# Patient Record
Sex: MALE | Race: BLACK OR AFRICAN AMERICAN | URBAN - METROPOLITAN AREA
[De-identification: names, ages, dates, MRNs, and addresses within clinical notes are randomized per-mention and may not be internally consistent; named-entity substitution may affect disease eponyms.]

---

## 2017-11-07 ENCOUNTER — APPOINTMENT (OUTPATIENT)
Dept: GENERAL RADIOLOGY | Facility: CLINIC | Age: 52
End: 2017-11-07
Payer: OTHER MISCELLANEOUS

## 2017-11-07 ENCOUNTER — TELEPHONE (OUTPATIENT)
Dept: ORTHOPEDICS | Facility: CLINIC | Age: 52
End: 2017-11-07

## 2017-11-07 ENCOUNTER — HOSPITAL ENCOUNTER (EMERGENCY)
Facility: CLINIC | Age: 52
Discharge: HOME | End: 2017-11-07
Attending: EMERGENCY MEDICINE | Admitting: EMERGENCY MEDICINE
Payer: OTHER MISCELLANEOUS

## 2017-11-07 VITALS
HEIGHT: 77 IN | RESPIRATION RATE: 16 BRPM | HEART RATE: 72 BPM | OXYGEN SATURATION: 99 % | TEMPERATURE: 98.2 F | WEIGHT: 214 LBS | BODY MASS INDEX: 25.27 KG/M2

## 2017-11-07 DIAGNOSIS — S52.614A CLOSED NONDISPLACED FRACTURE OF STYLOID PROCESS OF RIGHT ULNA, INITIAL ENCOUNTER: ICD-10-CM

## 2017-11-07 DIAGNOSIS — S52.531A CLOSED COLLES' FRACTURE OF RIGHT RADIUS, INITIAL ENCOUNTER: ICD-10-CM

## 2017-11-07 PROCEDURE — 99285 EMERGENCY DEPT VISIT HI MDM: CPT | Mod: 25 | Performed by: EMERGENCY MEDICINE

## 2017-11-07 PROCEDURE — 96374 THER/PROPH/DIAG INJ IV PUSH: CPT | Performed by: EMERGENCY MEDICINE

## 2017-11-07 PROCEDURE — 73080 X-RAY EXAM OF ELBOW: CPT | Mod: RT

## 2017-11-07 PROCEDURE — 25605 CLTX DST RDL FX/EPHYS SEP W/: CPT | Mod: Z6 | Performed by: EMERGENCY MEDICINE

## 2017-11-07 PROCEDURE — 40000986 XR WRIST RT G/E 3 VW: Mod: RT

## 2017-11-07 PROCEDURE — 73110 X-RAY EXAM OF WRIST: CPT | Mod: RT

## 2017-11-07 PROCEDURE — 96376 TX/PRO/DX INJ SAME DRUG ADON: CPT | Performed by: EMERGENCY MEDICINE

## 2017-11-07 PROCEDURE — 25605 CLTX DST RDL FX/EPHYS SEP W/: CPT | Performed by: EMERGENCY MEDICINE

## 2017-11-07 PROCEDURE — 25000128 H RX IP 250 OP 636: Performed by: STUDENT IN AN ORGANIZED HEALTH CARE EDUCATION/TRAINING PROGRAM

## 2017-11-07 PROCEDURE — 25000125 ZZHC RX 250

## 2017-11-07 RX ORDER — OXYCODONE HYDROCHLORIDE 5 MG/1
5 TABLET ORAL EVERY 6 HOURS PRN
Qty: 10 TABLET | Refills: 0 | Status: SHIPPED | OUTPATIENT
Start: 2017-11-07 | End: 2017-11-14

## 2017-11-07 RX ORDER — HYDROMORPHONE HYDROCHLORIDE 1 MG/ML
0.5 INJECTION, SOLUTION INTRAMUSCULAR; INTRAVENOUS; SUBCUTANEOUS ONCE
Status: COMPLETED | OUTPATIENT
Start: 2017-11-07 | End: 2017-11-07

## 2017-11-07 RX ORDER — LIDOCAINE HYDROCHLORIDE 10 MG/ML
INJECTION, SOLUTION EPIDURAL; INFILTRATION; INTRACAUDAL; PERINEURAL
Status: COMPLETED
Start: 2017-11-07 | End: 2017-11-07

## 2017-11-07 RX ADMIN — LIDOCAINE HYDROCHLORIDE: 10 INJECTION, SOLUTION EPIDURAL; INFILTRATION; INTRACAUDAL; PERINEURAL at 12:39

## 2017-11-07 RX ADMIN — HYDROMORPHONE HYDROCHLORIDE 0.5 MG: 1 INJECTION, SOLUTION INTRAMUSCULAR; INTRAVENOUS; SUBCUTANEOUS at 12:40

## 2017-11-07 RX ADMIN — HYDROMORPHONE HYDROCHLORIDE 0.5 MG: 1 INJECTION, SOLUTION INTRAMUSCULAR; INTRAVENOUS; SUBCUTANEOUS at 10:33

## 2017-11-07 ASSESSMENT — ENCOUNTER SYMPTOMS
COLOR CHANGE: 0
BACK PAIN: 0
WOUND: 0
NUMBNESS: 0

## 2017-11-07 NOTE — TELEPHONE ENCOUNTER
Call placed to patient regarding appointment with Dr. Woodard on Thursday 11/9/17 at 6:15. Instructed him to arrive at 6:00.  Patient verbalized understanding and will have a ride to the appointment.

## 2017-11-07 NOTE — ED AVS SNAPSHOT
[facility], [place], Emergency Department    [address]    [facility] [address]    Phone:  [phone]    Fax:  [phone]                                       Justice Yee   MRN: [id number]    Department:  [facility], Emergency Department   Date of Visit:  11/7/2017           After Visit Summary Signature Page     I have received my discharge instructions, and my questions have been answered. I have discussed any challenges I see with this plan with the nurse or doctor.    ..........................................................................................................................................  Patient/Patient Representative Signature      ..........................................................................................................................................  Patient Representative Print Name and Relationship to Patient    ..................................................               ................................................  Date                                            Time    ..........................................................................................................................................  Reviewed by Signature/Title    ...................................................              ..............................................  Date                                                            Time

## 2017-11-07 NOTE — ED AVS SNAPSHOT
[facility], Emergency Department    [address]    [place] [address]    Phone:  [phone]    Fax:  [phone]                                       Justice Yee   MRN: [id number]    Department:  [facility], Emergency Department   Date of Visit:  11/7/2017           Patient Information     Date Of Birth          1965        Your diagnoses for this visit were:     Closed Colles' fracture of right radius, initial encounter     Closed nondisplaced fracture of styloid process of right ulna, initial encounter        You were seen by Anna Iverson MD.        Discharge Instructions       You were seen in the emergency department for a wrist fracture. You were given Dilaudid in the ED for pain and your wrist fracture was reduced to improve the alignment of the bones. A splint was applied. Please return the the emergency department if you have numbness in your fingers or hand or if you have difficulty moving your fingers. Please elevate your wrist, apply ice, and do not lift or use your arm. You can take Tylenol every 6 hours for pain as needed. Please use Tylenol before using the oxycodone.    Please make an appointment to follow up with Orthopedics (phone: [phone]) at your scheduled appointment.     Discharge References/Attachments     FRACTURE, WRIST, GENERAL (ENGLISH)      Future Appointments        Provider Department Dept Phone Center    11/9/2017 6:15 PM Carlitos Woodard MD [facility] [phone] [facility]      24 Hour Appointment Hotline       To make an appointment at any [facility], call [phone] ([phone]). If you don't have a family doctor or clinic, we will help you find one. [facility] are conveniently located to serve the needs of you and your family.             Review of your medicines      START taking        Dose / Directions Last dose taken    oxyCODONE IR 5 MG tablet   Commonly known as:  ROXICODONE   Dose:  5 mg   Quantity:   "10 tablet        Take 1 tablet (5 mg) by mouth every 6 hours as needed for pain   Refills:  0                Prescriptions were sent or printed at these locations (1 Prescription)                   Other Prescriptions                Printed at Department/Unit printer (1 of 1)         oxyCODONE IR (ROXICODONE) 5 MG tablet                Procedures and tests performed during your visit     Procedure/Test Number of Times Performed    Elbow XR, G/E 3 views, right 1    Orthopedic injury treatment 1    Wrist XR, G/E 3 views, right 2      Orders Needing Specimen Collection     None      Pending Results     No orders found from 11/5/2017 to 11/8/2017.            Pending Culture Results     No orders found from 11/5/2017 to 11/8/2017.            Pending Results Instructions     If you had any lab results that were not finalized at the time of your Discharge, you can call the ED Lab Result RN at [phone]. You will be contacted by this team for any positive Lab results or changes in treatment. The nurses are available 7 days a week from 10A to 6:30P.  You can leave a message 24 hours per day and they will return your call.        Thank you for choosing [facility]       Thank you for choosing [facility] for your care. Our goal is always to provide you with excellent care. Hearing back from our patients is one way we can continue to improve our services. Please take a few minutes to complete the written survey that you may receive in the mail after you visit with us. Thank you!        [employer] Information     [employer] lets you send messages to your doctor, view your test results, renew your prescriptions, schedule appointments and more. To sign up, go to [email or web address] . Click on \"Log in\" on the left side of the screen, which will take you to the Welcome page. Then click on \"Sign up Now\" on the right side of the page.     You will be asked to enter the access code listed below, as well as some personal information. " Please follow the directions to create your username and password.     Your access code is: [email or web address]  Expires: 2018  3:06 PM     Your access code will  in 90 days. If you need help or a new code, please call your [facility] clinic or [phone].        Care EveryWhere ID     This is your Care EveryWhere ID. This could be used by other organizations to access your [facility] medical records  [id number]        Equal Access to Services     [facility] : Hadii aad ku hadasho Soomaali, waaxda luqadaha, qaybta kaalmada aderios, sandra waters . So [facility] [phone].    ATENCIÓN: Si habla español, tiene a otero disposición servicios gratuitos de asistencia lingüística. Llame al [phone].    We comply with applicable federal civil rights laws and [place] laws. We do not discriminate on the basis of race, color, national origin, age, disability, sex, sexual orientation, or gender identity.            After Visit Summary       This is your record. Keep this with you and show to your community pharmacist(s) and doctor(s) at your next visit.

## 2017-11-07 NOTE — DISCHARGE INSTRUCTIONS
You were seen in the emergency department for a wrist fracture. You were given Dilaudid in the ED for pain and your wrist fracture was reduced to improve the alignment of the bones. A splint was applied. Please return the the emergency department if you have numbness in your fingers or hand or if you have difficulty moving your fingers. Please elevate your wrist, apply ice, and do not lift or use your arm. You can take Tylenol every 6 hours for pain as needed. Please use Tylenol before using the oxycodone.    Please make an appointment to follow up with Orthopedics (phone: [phone]) at your scheduled appointment.

## 2017-11-07 NOTE — ED PROVIDER NOTES
"  History     Chief Complaint   Patient presents with     Wrist Pain     right wrist     HPI  Justice Yee is a 52 year old right-handed male with no PMH who presents with right wrist pain after a fall. 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 He extended his right wrist to break his fall. He landed on his right hand, wrist, and elbow and heard a crack. He denies any LOC, hitting his head, or hitting any other part of his body. He felt immediate pain to the wrist and his coworkers splinted it. He had numbness to the dorsum of his hand and fingers after he fell that has since resolved. He reports 6/10, non-radiating pain to the wrist that is made much worse with movement. He has not taken anything for the pain. He denies numbness, tingling, elbow pain, or any other concerns.     I have reviewed the Medications, Allergies, Past Medical and Surgical History, and Social History in the Epic system.    History reviewed. No pertinent past medical history.    History reviewed. No pertinent surgical history.    No family history on file.    Social History   Substance Use Topics     Smoking status: Never Smoker     Smokeless tobacco: Never Used     Alcohol use No       Review of Systems   Musculoskeletal: Negative for back pain.        Right wrist pain. No elbow pain. No other joint pain.    Skin: Negative for color change and wound.   Neurological: Negative for syncope and numbness.        No tingling   All other systems reviewed and are negative.      Physical Exam   Pulse: 83  Temp: 98.2  F (36.8  C)  Resp: 16  Height: 195.6 cm (6' 5\")  Weight: 97.1 kg (214 lb)  SpO2: 99 %      Physical Exam   Constitutional: He is oriented to person, place, and time. He appears well-developed and well-nourished. No distress.   Appears mildly uncomfortably. Sitting on side of gurney. Pleasant. Cooperative.   HENT:   Head: Normocephalic and " atraumatic.   Eyes: No scleral icterus.   Neck: Normal range of motion. Neck supple.   Cardiovascular: Normal rate, regular rhythm, normal heart sounds and intact distal pulses.    No murmur heard.  Pulses:       Radial pulses are 2+ on the right side   Pulmonary/Chest: Effort normal and breath sounds normal. No respiratory distress. He has no wheezes. He has no rales.   Musculoskeletal:   Dinner-fork deformity of the right wrist. No skin wound. Tenderness to palpation of the wrist wrist. No ROM of wrist due to pain. Right elbow is without deformity and is nontender to palpation.    Neurological: He is alert and oriented to person, place, and time. No sensory deficit.   Sensation intact distal to right wrist. Able to move all right digits without difficulty.    Skin: Skin is warm and dry. No laceration and no rash noted. He is not diaphoretic. No erythema. No pallor.       ED Course     ED Course   9:46 AM Patient seen by resident  10:07 AM Patient seen by attending.  11:30 AM Discussed with orthopedics. Agree with plan for reduction. Patient will likely need surgery and orthopedics will set up an appointment.  12:30 PM Patient reassessed. Informed of xray results. Splint applied.  2:20 PM Discussed with orthopedics. No additional interventions needed in ED.  2:30 PM Patient reassessed. Discharged.     Orthopedic injury tx  Date/Time: 11/7/2017 2:56 PM  Performed by: LATIA SIBLEY  Authorized by: LATIA ISBLEY   Consent: Verbal consent obtained.  Risks and benefits: risks, benefits and alternatives were discussed  Consent given by: patient  Injury location: wrist  Location details: right wrist  Injury type: fracture  Fracture type: distal radius and ulnar styloid  Pre-procedure neurovascular assessment: neurovascularly intact  Pre-procedure distal perfusion: normal  Pre-procedure neurological function: normal  Pre-procedure range of motion: reduced  Anesthesia: hematoma block    Anesthesia:  Local  anesthesia used: yes  Local Anesthetic: lidocaine 1% without epinephrine  Anesthetic total: 10 mL    Sedation:  Patient sedated: no  Manipulation performed: yes  Reduction successful: yes  X-ray confirmed reduction: yes  Immobilization: splint  Splint type: sugar tong  Supplies used: plaster  Post-procedure neurovascular assessment: post-procedure neurovascularly intact  Post-procedure distal perfusion: normal  Post-procedure neurological function: normal  Post-procedure range of motion comment: splinted  Patient tolerance: Patient tolerated the procedure well with no immediate complications                   Results for orders placed or performed during the hospital encounter of 11/07/17 (from the past 24 hour(s))   Wrist XR, G/E 3 views, right    Narrative    XR WRIST RT G/E 3 VW 11/7/2017 11:12 AM    HISTORY: 51yo M fell off ladder.    COMPARISON: None.    FINDINGS: Comminuted intra-articular distal radial fracture with  impaction at the fracture site. Nondisplaced fracture through the  ulnar styloid. Carpal bones appear intact. Prominent dorsal soft  tissue swelling noted.      Impression    IMPRESSION: Intra-articular distal radial fracture and nondisplaced  ulnar styloid fracture.    KARLA OLIVEIRA MD   Elbow XR, G/E 3 views, right    Narrative    XR ELBOW RT G/E 3 VW 11/7/2017 11:13 AM    HISTORY: 51yo M fell off ladder.    COMPARISON: None.    FINDINGS: No fracture or malalignment. No joint effusion. Osseous  structures are within normal limits.      Impression    IMPRESSION: No acute osseous abnormality.    KARLA OLIVEIRA MD   Wrist XR, G/E 3 views, right    Narrative    XR WRIST RT G/E 3 VW 11/7/2017 2:18 PM    HISTORY: Radial and ulnar fracture reduction.    COMPARISON: 11/7/2017 at 11:01    FINDINGS: Alignment at the radial fracture site is improved. Ulnar  styloid fracture fragments are in anatomic alignment. No new  abnormality.      Impression    IMPRESSION: Improved alignment.    KARLA OLIVEIRA MD             Assessments & Plan (with Medical Decision Making)   Justice Yee is a 52 year old right-handed male with no PMH who presents with right wrist pain after a fall. Upon arrival in the ED, Mr. Yee had normal vital signs and was nontoxic appearing. He appeared to be mildly uncomfortable due to pain. He has an obvious deformity of his right wrist that could suggest a distal radius fracture. Will therefore obtain right wrist xray to assess for a fracture. Will given IV dilaudid for his current pain and because of anticipation of possible reduction. Mr. Yee did hit his right elbow, therefore will obtain right elbow xray. No other injuries. Neurovascularly intact. No open fracture. No evidence of compartment syndrome. X ray of wrist shows intra-articular distal radial fracture and nondisplaced ulnar styloid fracture. Elbow without fracture. Hematoma block given, reduction performed, and sugar tongs splint applied. Patient given Dilaudid 0.5mg IV after splint applied. Sensation intact distal to splint. Patient able to move his fingers. Post-reduction wrist xray showed improved alignment. Discussed with orthopedic surgery who will see the patient in 3-5 days in clinic for planned surgical reduction. Mr. Yee was instructed to ice and elevate his arm and not use the arm. He was instructed to take Tylenol q6hrs for pain and take oxycodone as needed. He was given a prescription for oxycodone. He was instructed to return to the ED if he has numbness to his fingers or is unable to move his fingers.     SUPERVISORY NOTE:    This data collected with the Resident working in the Emergency Department.  Patient was seen and evaluated by myself and I repeated the history and physical exam with the patient.  The plan of care was discussed with them.  The key portions of the note including the entire assessment and plan reflect my documentation.    53 y/o right hand dominant male who sustained a mechanical fall on an  outstretched right hand during work.  No associated injuries on history or physical exam.  Patient is neurovascularly intact.  No open fracture.  No evidence of compartment syndrome.  Xray shows intra-articular distal radial fracture with ulnar styloid fracture.  Hematoma block completed, wrist reduced and splinted.  Discussed with orthopedic surgery and will see in clinic in 3-5 days to plan surgical reduction.  Given analgesics and close return instructions and voiced understanding.      I have reviewed the nursing notes.    I have reviewed the findings, diagnosis, plan and need for follow up with the patient.    New Prescriptions    OXYCODONE IR (ROXICODONE) 5 MG TABLET    Take 1 tablet (5 mg) by mouth every 6 hours as needed for pain       Final diagnoses:   Closed Colles' fracture of right radius, initial encounter   Closed nondisplaced fracture of styloid process of right ulna, initial encounter     Babita Gerardo MD    11/7/2017   [facility], [place], EMERGENCY DEPARTMENT     Anna Iverson MD  11/07/17 [id number]

## 2017-11-07 NOTE — TELEPHONE ENCOUNTER
APPT INFO    Date /Time: 11/9/17 6:15pm   Reason for Appt: R Distal Radius Fx   Ref Provider/Clinic: ED/Hosp   Are there internal records? If yes, list: Yes - [facility] ED/Hosp 11/7/17   Patient Contact (Y/N) & Call Details: No - hosp f/u   Action: --

## 2017-11-09 ENCOUNTER — DOCUMENTATION ONLY (OUTPATIENT)
Dept: ORTHOPEDICS | Facility: CLINIC | Age: 52
End: 2017-11-09

## 2017-11-09 ENCOUNTER — PRE VISIT (OUTPATIENT)
Dept: ORTHOPEDICS | Facility: CLINIC | Age: 52
End: 2017-11-09

## 2017-11-09 ENCOUNTER — OFFICE VISIT (OUTPATIENT)
Dept: ORTHOPEDICS | Facility: CLINIC | Age: 52
End: 2017-11-09

## 2017-11-09 VITALS — WEIGHT: 214 LBS | HEIGHT: 77 IN | BODY MASS INDEX: 25.27 KG/M2

## 2017-11-09 DIAGNOSIS — S52.571A OTHER CLOSED INTRA-ARTICULAR FRACTURE OF DISTAL END OF RIGHT RADIUS, INITIAL ENCOUNTER: Primary | ICD-10-CM

## 2017-11-09 RX ORDER — OXYCODONE HYDROCHLORIDE 5 MG/1
5 TABLET ORAL EVERY 4 HOURS PRN
Qty: 30 TABLET | Refills: 0 | Status: SHIPPED | OUTPATIENT
Start: 2017-11-09 | End: 2017-11-13

## 2017-11-09 NOTE — LETTER
"2017       RE: Justice Yee  [address]   [facility] [address]     Dear Colleague,    Thank you for referring your patient, Justice Yee, to the [facility] ORTH[facility] at [facility]. Please see a copy of my visit note below.    REFERRING PROVIDER: Dr. 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 He comes in today with his younger son. He was evaluated in the ED and was found to have a distal radius fracture. He was reduced, splinted, and referred here. Reports intermittent numbness and tingling due to swelling. Is requesting pain medication.    MEDS:   Prior to Admission medications    Medication Sig Start Date End Date Taking? Authorizing Provider   oxyCODONE IR (ROXICODONE) 5 MG tablet Take 1 tablet (5 mg) by mouth every 6 hours as needed for pain 17   Babita Gerardo MD       ALLERGIES: No Known Allergies    PMH: None    PSH: None    SH:   Social History   Substance Use Topics     Smoking status: Never Smoker     Smokeless tobacco: Never Used     Alcohol use No       FH: None    ROS: Denies chest pain, shortness of breath, MI, CVA, diabetes, DVT, PE, and bleeding disorders.    PHYSICAL EXAMINATION: Ht 1.956 m (6' 5\")  Wt 97.1 kg (214 lb)  BMI 25.38 kg/m2  Gen: In no acute distress.  On exam, the exposed R fingers are moderately swollen. The splint Ace bandage was loosened and this improved the tingling sensation in the fingers. All digits were well perfused. R thumb and IF flexion and extension were intact. Sensation to light touch intact in all digits.    IMAGIN2017 XR of R wrist reviewed. This showed partial reduction following splinting, but still with reduced radial height, dorsal tilt, and intra articular fracture of the distal radius.    ASSESSMENT: R " distal radius fracture with inadequate reduction. R ulnar styloid fracture, nondisplaced. Sensory symptoms improved with loosening of Ace bandage.    PLAN: Patient is a candidate for ORIF of R distal radius. Risks, benefits, and alternatives were discussed. Explained the possible need for ulnar styloid fixation if DRUJ is unstable. Patient wishes to proceed with surgery. Workman's comp paperwork filled out. Will keep patient out of work until surgery due to the type of work he does. Consents obtained today. Pain medication given to last until early next week.    Total time spent with patient was 30 min of which greater than 50% was in counseling.    Again, thank you for allowing me to participate in the care of your patient.      Sincerely,    Carlitos Woodard MD

## 2017-11-09 NOTE — MR AVS SNAPSHOT
"              After Visit Summary   2017    Justice Yee    MRN: [id number]           Patient Information     Date Of Birth          1965        Visit Information        Provider Department      2017 6:15 PM Carlitos Woodard MD [facility]        Today's Diagnoses     Other closed intra-articular fracture of distal end of right radius, initial encounter    -  1       Follow-ups after your visit        Who to contact     Please call your clinic at [phone] to:    Ask questions about your health    Make or cancel appointments    Discuss your medicines    Learn about your test results    Speak to your doctor   If you have compliments or concerns about an experience at your clinic, or if you wish to file a complaint, please contact [facility] Patient Relations at [phone] or email us at [email or web address]         Additional Information About Your Visit        MyChart Information     TalkBin is an electronic gateway that provides easy, online access to your medical records. With TalkBin, you can request a clinic appointment, read your test results, renew a prescription or communicate with your care team.     To sign up for TalkBin visit the website at [email or web address]   You will be asked to enter the access code listed below, as well as some personal information. Please follow the directions to create your username and password.     Your access code is: [email or web address]  Expires: 2018  3:06 PM     Your access code will  in 90 days. If you need help or a new code, please contact your [facility] or call [phone] for assistance.        Care EveryWhere ID     This is your Care EveryWhere ID. This could be used by other organizations to access your [place] medical records  [id number]        Your Vitals Were     Height BMI (Body Mass Index)                1.956 m (6' 5\") 25.38 kg/m2           " Blood Pressure from Last 3 Encounters:   No data found for BP    Weight from Last 3 Encounters:   11/09/17 97.1 kg (214 lb)   11/07/17 97.1 kg (214 lb)              Today, you had the following     No orders found for display         Today's Medication Changes          These changes are accurate as of: 11/9/17  6:54 PM.  If you have any questions, ask your nurse or doctor.               These medicines have changed or have updated prescriptions.        Dose/Directions    * oxyCODONE IR 5 MG tablet   Commonly known as:  ROXICODONE   This may have changed:  Another medication with the same name was added. Make sure you understand how and when to take each.        Dose:  5 mg   Take 1 tablet (5 mg) by mouth every 6 hours as needed for pain   Quantity:  10 tablet   Refills:  0       * oxyCODONE IR 5 MG tablet   Commonly known as:  ROXICODONE   This may have changed:  You were already taking a medication with the same name, and this prescription was added. Make sure you understand how and when to take each.   Used for:  Other closed intra-articular fracture of distal end of right radius, initial encounter   Changed by:  Carlitos Woodard MD        Dose:  5 mg   Take 1 tablet (5 mg) by mouth every 4 hours as needed for moderate to severe pain   Quantity:  30 tablet   Refills:  0       * Notice:  This list has 2 medication(s) that are the same as other medications prescribed for you. Read the directions carefully, and ask your doctor or other care provider to review them with you.         Where to get your medicines      Some of these will need a paper prescription and others can be bought over the counter.  Ask your nurse if you have questions.     Bring a paper prescription for each of these medications     oxyCODONE IR 5 MG tablet                Primary Care Provider    Physician No Ref-Primary       NO REF-PRIMARY PHYSICIAN        Equal Access to Services     DANIEL HANSEN AH: Hadii jerman Horne,  pola flipboochelly paezsandra nation ah. So [facility] [phone].    ATENCIÓN: Si libia hughes, tiene a otero disposición servicios gratuitos de asistencia lingüística. Siobhan al [phone].    We comply with applicable federal civil rights laws and [place] laws. We do not discriminate on the basis of race, color, national origin, age, disability, sex, sexual orientation, or gender identity.            Thank you!     Thank you for choosing [facility]  for your care. Our goal is always to provide you with excellent care. Hearing back from our patients is one way we can continue to improve our services. Please take a few minutes to complete the written survey that you may receive in the mail after your visit with us. Thank you!             Your Updated Medication List - Protect others around you: Learn how to safely use, store and throw away your medicines at www.disposemymeds.org.          This list is accurate as of: 11/9/17  6:54 PM.  Always use your most recent med list.                   Brand Name Dispense Instructions for use Diagnosis    * oxyCODONE IR 5 MG tablet    ROXICODONE    10 tablet    Take 1 tablet (5 mg) by mouth every 6 hours as needed for pain        * oxyCODONE IR 5 MG tablet    ROXICODONE    30 tablet    Take 1 tablet (5 mg) by mouth every 4 hours as needed for moderate to severe pain    Other closed intra-articular fracture of distal end of right radius, initial encounter       * Notice:  This list has 2 medication(s) that are the same as other medications prescribed for you. Read the directions carefully, and ask your doctor or other care provider to review them with you.

## 2017-11-09 NOTE — NURSING NOTE
"Reason For Visit:   Chief Complaint   Patient presents with     Consult     Right distal radius fx.DOI: 11/7/17       Primary MD: No Ref-Primary, Physician  Ref. MD:  in ED    ?  No    Age: 52 year old    Occupation:  at [employer]  Currently working? No.  Date of injury: 11/7/17  Type of injury: FOOSH off ladder.  .  Smoker: No  Request smoking cessation information: No      Ht 1.956 m (6' 5\")  Wt 97.1 kg (214 lb)  BMI 25.38 kg/m2         QuickDASH Assessment  No flowsheet data found.       No Known Allergies    Anna Chung ATC      "

## 2017-11-10 ENCOUNTER — ANESTHESIA EVENT (OUTPATIENT)
Dept: SURGERY | Facility: AMBULATORY SURGERY CENTER | Age: 52
End: 2017-11-10

## 2017-11-10 ENCOUNTER — TELEPHONE (OUTPATIENT)
Dept: ORTHOPEDICS | Facility: CLINIC | Age: 52
End: 2017-11-10

## 2017-11-10 DIAGNOSIS — S52.509A DISTAL RADIUS FRACTURE: Primary | ICD-10-CM

## 2017-11-10 NOTE — PROGRESS NOTES
Documentation error made on previous note: Error on laterality. The patient has a Right distal radius fracture- is scheduled for ORIF of right distal radius.   Patient provider is Carlitos Woodard.

## 2017-11-10 NOTE — TELEPHONE ENCOUNTER
Called patient this am to inform him of his PAC appointment on Monday 11/13/2017 at 1030 for the pre-operative history and physical. Also, informed him of his surgery date and time (Tuesday 11/14/2017). He verbalized understanding of above information.

## 2017-11-10 NOTE — PROGRESS NOTES
Teaching Flowsheet   Relevant Diagnosis: Left distal radius fracture   Teaching Topic: Open reduction internal fixation of left distal radius and possible fixation of ulnar styloid      Person(s) involved in teaching:   Patient and Son     Motivation Level:  Asks Questions: Yes  Eager to Learn: Yes  Cooperative: Yes  Receptive (willing/able to accept information): Yes  Any cultural factors/Mormon beliefs that may influence understanding or compliance? No       Patient and Family demonstrates understanding of the following:  Reason for the appointment, diagnosis and treatment plan: Yes  Knowledge of proper use of medications and conditions for which they are ordered (with special attention to potential side effects or drug interactions): Yes  Which situations necessitate calling provider and whom to contact: Yes       Teaching Concerns Addressed:   Patient does not have a PCP and would like to have the history and physical completed by the PAC. Will call and schedule the PAC appointment for Monday 11/13/17 for surgery on Tuesday 11/14/17.  Patient does not take any meds and states he has a negative medical history.       Proper use and care of  (medical equip, care aids, etc.): Yes  Nutritional needs and diet plan: Yes  Pain management techniques: Yes  Wound Care: Yes  How and/when to access community resources: Yes     Instructional Materials Used/Given: Pre-operative teaching packet and anti-bacterial soap.      Time spent with patient: 15 minutes.

## 2017-11-10 NOTE — PROGRESS NOTES
"REFERRING PROVIDER: Dr. 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 He comes in today with his younger son. He was evaluated in the ED and was found to have a distal radius fracture. He was reduced, splinted, and referred here. Reports intermittent numbness and tingling due to swelling. Is requesting pain medication.    MEDS:   Prior to Admission medications    Medication Sig Start Date End Date Taking? Authorizing Provider   oxyCODONE IR (ROXICODONE) 5 MG tablet Take 1 tablet (5 mg) by mouth every 6 hours as needed for pain 17   Babita Gerardo MD       ALLERGIES: No Known Allergies    PMH: None    PSH: None    SH:   Social History   Substance Use Topics     Smoking status: Never Smoker     Smokeless tobacco: Never Used     Alcohol use No       FH: None    ROS: Denies chest pain, shortness of breath, MI, CVA, diabetes, DVT, PE, and bleeding disorders.    PHYSICAL EXAMINATION: Ht 1.956 m (6' 5\")  Wt 97.1 kg (214 lb)  BMI 25.38 kg/m2  Gen: In no acute distress.  On exam, the exposed R fingers are moderately swollen. The splint Ace bandage was loosened and this improved the tingling sensation in the fingers. All digits were well perfused. R thumb and IF flexion and extension were intact. Sensation to light touch intact in all digits.    IMAGIN2017 XR of R wrist reviewed. This showed partial reduction following splinting, but still with reduced radial height, dorsal tilt, and intra articular fracture of the distal radius.    ASSESSMENT: R distal radius fracture with inadequate reduction. R ulnar styloid fracture, nondisplaced. Sensory symptoms improved with loosening of Ace bandage.    PLAN: Patient is a candidate for ORIF of R distal radius. Risks, benefits, and alternatives were discussed. Explained the possible need for ulnar " styloid fixation if DRUJ is unstable. Patient wishes to proceed with surgery. Workman's comp paperwork filled out. Will keep patient out of work until surgery due to the type of work he does. Consents obtained today. Pain medication given to last until early next week.    Total time spent with patient was 30 min of which greater than 50% was in counseling.

## 2017-11-13 ENCOUNTER — ALLIED HEALTH/NURSE VISIT (OUTPATIENT)
Dept: SURGERY | Facility: CLINIC | Age: 52
End: 2017-11-13

## 2017-11-13 ENCOUNTER — OFFICE VISIT (OUTPATIENT)
Dept: SURGERY | Facility: CLINIC | Age: 52
End: 2017-11-13

## 2017-11-13 VITALS
BODY MASS INDEX: 28.71 KG/M2 | HEIGHT: 72 IN | WEIGHT: 212 LBS | SYSTOLIC BLOOD PRESSURE: 156 MMHG | TEMPERATURE: 98 F | HEART RATE: 70 BPM | RESPIRATION RATE: 18 BRPM | OXYGEN SATURATION: 98 % | DIASTOLIC BLOOD PRESSURE: 85 MMHG

## 2017-11-13 DIAGNOSIS — Z01.818 PREOP EXAMINATION: Primary | ICD-10-CM

## 2017-11-13 DIAGNOSIS — S52.101A CLOSED FRACTURE OF PROXIMAL END OF RIGHT RADIUS, UNSPECIFIED FRACTURE MORPHOLOGY, INITIAL ENCOUNTER: ICD-10-CM

## 2017-11-13 LAB
CREAT SERPL-MCNC: 0.54 MG/DL (ref 0.66–1.25)
GFR SERPL CREATININE-BSD FRML MDRD: >90 ML/MIN/1.7M2
HGB BLD-MCNC: 14.8 G/DL (ref 13.3–17.7)
POTASSIUM SERPL-SCNC: 3.8 MMOL/L (ref 3.4–5.3)

## 2017-11-13 ASSESSMENT — LIFESTYLE VARIABLES: TOBACCO_USE: 1

## 2017-11-13 NOTE — ANESTHESIA PREPROCEDURE EVALUATION
Anesthesia Evaluation     . Pt has not had prior anesthetic            ROS/MED HX    ENT/Pulmonary:     (+)tobacco use, Past use 3 pack years, quit 1996 packs/day  , . .    Neurologic:  - neg neurologic ROS     Cardiovascular:  - neg cardiovascular ROS   (+) ----. : . . . :. . No previous cardiac testing       METS/Exercise Tolerance:  >4 METS   Hematologic:  - neg hematologic  ROS       Musculoskeletal:  - neg musculoskeletal ROS       GI/Hepatic:  - neg GI/hepatic ROS       Renal/Genitourinary:  - ROS Renal section negative       Endo:  - neg endo ROS       Psychiatric:  - neg psychiatric ROS       Infectious Disease:  - neg infectious disease ROS       Malignancy:      - no malignancy   Other:    (+) no H/O Chronic Pain,                   Physical Exam  Normal systems: cardiovascular, pulmonary and dental    Airway   Mallampati: I  TM distance: >3 FB  Neck ROM: full    Dental     Cardiovascular   Rhythm and rate: regular and normal      Pulmonary    breath sounds clear to auscultation               PAC Discussion and Assessment    ASA Classification: 1  Case is suitable for: ASC  Anesthetic techniques and relevant risks discussed: GA  Invasive monitoring and risk discussed:   Types:   Possibility and Risk of blood transfusion discussed:   NPO instructions given:   Additional anesthetic preparation and risks discussed:   Needs early admission to pre-op area:   Other:     PAC Resident/NP Anesthesia Assessment:  Justice Yee is a 52 year old male who presents for pre-operative H & P in preparation for an Open Reduction Internal Fixation of Right Distal Radius Fracture on 11/14/17 with Dr. Carlitos Woodard at the [facility].   PAC referral for risk assessment and optimization for anesthesia with comorbid conditions of:    Pre-operative considerations:  1.  Cardiac:  Functional status METS>4   Risk of Major Adverse Cardiac event: 0.4%  2.  Pulm:   BOGDAN risk: low  3.  GI:  Risk of PONV score =  2.  If > 2, anti-emetic intervention recommended.    Patient is optimized and is acceptable candidate for the proposed procedure.  No further diagnostic evaluation is needed.   Patient discussed with Dr. Head.     Estela Salinas MS, PA-C  11/13/17 4:24 PM        Mid-Level Provider/Resident:   Date:   Time:     Attending Anesthesiologist Anesthesia Assessment:        Anesthesiologist:   Date:   Time:   Pass/Fail:   Disposition:     PAC Pharmacist Assessment:        Pharmacist:   Date:   Time:      Anesthesia Plan      History & Physical Review  History and physical reviewed and following examination; no interval change.    ASA Status:  1 .    NPO Status:  > 6 hours    Plan for MAC, Peripheral Nerve Block and Periph. Nerve Block for postop pain with Intravenous induction. Maintenance will be TIVA.  Reason for MAC:  Deep or markedly invasive procedure (G8)  PONV prophylaxis:  Ondansetron (or other 5HT-3) and Dexamethasone or Solumedrol       Postoperative Care  Postoperative pain management:  Peripheral nerve block (Single Shot), Multi-modal analgesia and Oral pain medications.      Consents  Anesthetic plan, risks, benefits and alternatives discussed with:  Patient.  Use of blood products discussed: No .   .        ANESTHESIA PREOP EVALUATION    Procedure: Procedure(s):  Open Reduction Internal Fixation of Right Distal Radius Fracture - Wound Class: I-Clean    HPI: Justice Yee is a 52 year old male presenting for above procedure after falling from a ladder on his outstretched hand.     PMHx/PSHx/ROS:  History reviewed. No pertinent past medical history.    History reviewed. No pertinent surgical history.      Past Anes Hx: No personal or family h/o anesthesia problems    Soc Hx:   Social History   Substance Use Topics     Smoking status: Former Smoker     Packs/day: 0.50     Years: 7.00     Quit date: 11/13/1995     Smokeless tobacco: Never Used     Alcohol use No       Allergies: No Known Allergies    Meds:  "    (Not in a hospital admission)    Current Outpatient Prescriptions   Medication Sig Dispense Refill     Acetaminophen (TYLENOL PO) Take 320 mg by mouth       oxyCODONE IR (ROXICODONE) 5 MG tablet Take 1 tablet (5 mg) by mouth every 6 hours as needed for pain 10 tablet 0       Physical Exam:  Vitals: BP (!) 143/93  Pulse 75  Temp 37  C (98.6  F) (Oral)  Resp 18  Ht 1.829 m (6' 0.01\")  Wt 96.2 kg (212 lb)  SpO2 97%  BMI 28.75 kg/m2  BMI= Body mass index is 28.75 kg/(m^2).      Labs:  UPT: No results found for: HCGQUANT      BMP:  Recent Labs   Lab Test  11/13/17   1206   POTASSIUM  3.8   CR  0.54*     CBC:   Recent Labs   Lab Test  11/13/17   1206   HGB  14.8     Coags:  No results for input(s): INR, PTT, FIBR in the last 50004 hours.    Assessment/Plan:  - ASA 1  - MAC with standard ASA monitors, IV induction  -Supraclavicular nerve block  - PIV  - Antibiotics per surgery  - PONV prophylaxis  - Relevant risks, benefits, alternatives and the anesthetic plan were discussed with patient/family or family representative.  All questions were answered and there was agreement to proceed.      Marcell HARDING-3, D.O.    11/14/2017  7:05 AM                      .  "

## 2017-11-13 NOTE — PATIENT INSTRUCTIONS
Preparing for Your Surgery      Name:  Justice Yee   MRN:  [id number]   :  1965   Today's Date:  2017     Arriving for surgery:  Surgery date:    Arrival time:  6:30AM  Please come to:     [facility]  [address] [place] [id number]     Parking is available in front of the Clinics and Surgery Center building from 5:30AM to 8:00PM.  -  Proceed to the 5th floor to check into the Ambulatory Surgery Center.              >> There will be patient concierges on the 1st and 5th floor, for assistance or an escort, if you would like.              >> Please call [phone] with any questions.    What can I eat or drink?  -  You may have solid food or milk products until 8 hours prior to your surgery--do not eat food after midnight on the night before surgery   -  You may have water, apple juice or 7up/Sprite until 2 hours prior to your surgery--OK to have CLEAR liquids until 6:00AM on the morning of surgery     Which medicines can I take?  -  Do NOT take these medications in the morning, the day of surgery:    Do not take aspirin for one week prior to surgery   Do not take ibuprofen for two days prior to surgery       -  Please take these medications the day of surgery:    OK to take pain pill oxycodone if needed prior to surgery         How do I prepare myself?  -  Take two showers: one the night before surgery; and one the morning of surgery.         Use Scrubcare or Hibiclens to wash from neck down.  You may use your own shampoo and conditioner. No other hair products.   -  Do NOT use lotion, powder, deodorant, or antiperspirant the day of your surgery.  -  Do NOT wear any makeup, fingernail polish or jewelry.  -Do not bring your own medications to the hospital, except for inhalers and eye drops.  -  Bring your ID and insurance card.    Questions or Concerns:  If you have questions or concerns, please call the  Preoperative Assessment Center, Monday-Friday  7AM-7PM:  [phone]            AFTER YOUR SURGERY  Breathing exercises   Breathing exercises help you recover faster. Take deep breaths and let the air out slowly. This will:     Help you wake up after surgery.    Help prevent complications like pneumonia.  Preventing complications will help you go home sooner.   We may give you a breathing device (incentive spirometer) to encourage you to breathe deeply.   Nausea and vomiting   You may feel sick to your stomach after surgery; if so, let your nurse know.    Pain control:  After surgery, you may have pain. Our goal is to help you manage your pain. Pain medicine will help you feel comfortable enough to do activities that will help you heal.  These activities may include breathing exercises, walking and physical therapy.   To help your health care team treat your pain we will ask: 1) If you have pain  2) where it is located 3) describe your pain in your words  Methods of pain control include medications given by mouth, vein or by nerve block for some surgeries.  We may give you a pain control pump that will:  1) Deliver the medicine through a tube placed in your vein  2) Control the amount of medicine you receive  3) Allow you to push a button to deliver a dose of pain medicine  Sequential Compression Device (SCD) or Pneumo Boots:  You may need to wear SCD S on your legs or feet. These are wraps connected to a machine that pumps in air and releases it. The repeated pumping helps prevent blood clots from forming.

## 2017-11-13 NOTE — H&P
Pre-Operative H & P     CC:  Preoperative exam to assess for increased cardiopulmonary risk while undergoing surgery and anesthesia.    Date of Encounter: November 13, 2017   Primary Care Physician:  No Ref-Primary, Physician       GISSELLE  Justice Yee is a 52 year old male who presents for pre-operative H & P in preparation for an Open Reduction Internal Fixation of Right Distal Radius Fracture on 11/14/17 with Dr. Carlitos Woodard at the [facility].     Mr. Yee fell off a ladder at work on to his outstretched R hand. He denies the accident was preceded by dizziness or syncope.  He has no other significant medical history.        History is obtained from the patient and medical record including Care Everywhere.    Past Medical History  No past medical history on file.      Past Surgical History  No past surgical history on file.      Prior to Admission Medications  Current Outpatient Prescriptions   Medication Sig Dispense Refill     oxyCODONE IR (ROXICODONE) 5 MG tablet Take 1 tablet (5 mg) by mouth every 6 hours as needed for pain 10 tablet 0         Allergies  Review of patient's allergies indicates no known allergies.     Social History  Social History     Social History     Marital status:      Spouse name: N/A     Number of children: N/A     Years of education: N/A     Occupational History     Not on file.     Social History Main Topics     Smoking status: Never Smoker     Smokeless tobacco: Never Used     Alcohol use No     Drug use: No     Sexual activity: Not on file     Other Topics Concern     Not on file     Social History Narrative     No narrative on file          Family History  No family history on file.     ROS  10 point review of systems performed.  All pertinent positives noted, otherwise Negative.    Labs: (personally reviewed):  Lab Results   Component Value Date    HGB 14.8 11/13/2017    POTASSIUM 3.8 11/13/2017    CR 0.54 (L) 11/13/2017           Physical  Exam:  No LMP for male patient.   Vital signs:  /85  Pulse 70  Temp 98  F (36.7  C) (Oral)  Resp 18  Ht 1.829 m (6')  Wt 96.2 kg (212 lb)  SpO2 98%  BMI 28.75 kg/m2    Constitutional: Awake, alert, cooperative, no apparent distress, and appears stated age.  Eyes: Pupils equal, round and reactive to light, sclera clear, conjunctiva normal.  HENT: Normocephalic, oral pharynx with moist mucus membranes. No goiter appreciated.   Respiratory: Clear to auscultation bilaterally, no crackles or wheezing.  Cardiovascular: Regular rate and rhythm, normal S1 and S2, and no overt murmur noted.  Carotids +2, no bruits. No edema. Palpable pulses to radial  DP and PT arteries.   GI: Normal bowel sounds, soft, non-distended, non-tender, no masses palpated  Skin: Warm and dry.  No visible rashes  Musculoskeletal: Full ROM of neck. There is no redness, warmth, or swelling of the exposed joints. Gross motor strength is normal.    Neurologic: Awake, alert, oriented to name, place and time.  Gait is normal.   Neuropsychiatric: Calm, cooperative. Normal affect.     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 Carlitos Woodard at the [facility].   PAC referral for risk assessment and optimization for anesthesia with comorbid conditions of:    Pre-operative considerations:  1.  Cardiac:  Functional status METS>4   Risk of Major Adverse Cardiac event: 0.4%  2.  Pulm:   BOGDAN risk: low  3.  GI:  Risk of PONV score = 2.  If > 2, anti-emetic intervention recommended.    Patient is optimized and is acceptable candidate for the proposed procedure.  No further diagnostic evaluation is needed.     Estela KIM-C   Preoperative Assessment Center  [facility]  Clinic and Surgery Center  Phone: [phone]  Fax: [phone]

## 2017-11-13 NOTE — MR AVS SNAPSHOT
After Visit Summary   2017    Justice Yee    MRN: [id number]           Patient Information     Date Of Birth          1965        Visit Information        Provider Department      2017 11:30 AM Rn, [facility]        Care Instructions    Preparing for Your Surgery      Name:  Justice Yee   MRN:  [id number]   :  1965   Today's Date:  2017     Arriving for surgery:  Surgery date:    Arrival time:  6:30AM  Please come to:     [facility]  [address] [place] [id number]     Parking is available in front of the Clinics and Surgery Center building from 5:30AM to 8:00PM.  -  Proceed to the 5th floor to check into the Ambulatory Surgery Center.              >> There will be patient concierges on the 1st and 5th floor, for assistance or an escort, if you would like.              >> Please call [phone] with any questions.    What can I eat or drink?  -  You may have solid food or milk products until 8 hours prior to your surgery--do not eat food after midnight on the night before surgery   -  You may have water, apple juice or 7up/Sprite until 2 hours prior to your surgery--OK to have CLEAR liquids until 6:00AM on the morning of surgery     Which medicines can I take?  -  Do NOT take these medications in the morning, the day of surgery:    Do not take aspirin for one week prior to surgery   Do not take ibuprofen for two days prior to surgery       -  Please take these medications the day of surgery:    OK to take pain pill oxycodone if needed prior to surgery         How do I prepare myself?  -  Take two showers: one the night before surgery; and one the morning of surgery.         Use Scrubcare or Hibiclens to wash from neck down.  You may use your own shampoo and conditioner. No other hair products.   -  Do NOT use lotion, powder, deodorant, or antiperspirant the day of your  surgery.  -  Do NOT wear any makeup, fingernail polish or jewelry.  -Do not bring your own medications to the hospital, except for inhalers and eye drops.  -  Bring your ID and insurance card.    Questions or Concerns:  If you have questions or concerns, please call the  Preoperative Assessment Center, Monday-Friday 7AM-7PM:  [phone]            AFTER YOUR SURGERY  Breathing exercises   Breathing exercises help you recover faster. Take deep breaths and let the air out slowly. This will:     Help you wake up after surgery.    Help prevent complications like pneumonia.  Preventing complications will help you go home sooner.   We may give you a breathing device (incentive spirometer) to encourage you to breathe deeply.   Nausea and vomiting   You may feel sick to your stomach after surgery; if so, let your nurse know.    Pain control:  After surgery, you may have pain. Our goal is to help you manage your pain. Pain medicine will help you feel comfortable enough to do activities that will help you heal.  These activities may include breathing exercises, walking and physical therapy.   To help your health care team treat your pain we will ask: 1) If you have pain  2) where it is located 3) describe your pain in your words  Methods of pain control include medications given by mouth, vein or by nerve block for some surgeries.  We may give you a pain control pump that will:  1) Deliver the medicine through a tube placed in your vein  2) Control the amount of medicine you receive  3) Allow you to push a button to deliver a dose of pain medicine  Sequential Compression Device (SCD) or Pneumo Boots:  You may need to wear SCD S on your legs or feet. These are wraps connected to a machine that pumps in air and releases it. The repeated pumping helps prevent blood clots from forming.           Follow-ups after your visit        Your next 10 appointments already scheduled     Nov 13, 2017 11:30 AM CST   (Arrive by 11:15 AM)    PAC RN ASSESSMENT with  Pac Rn   [facility] ([facility])    [address] [place] [address]  [address] [facility] [id number] [phone]            Nov 13, 2017 12:00 PM CST   (Arrive by 11:45 AM)   PAC Anesthesia Consult with  Pac Anesthesiologist   [facility] ([facility])    [address] [place] [address]  [address] [facility] [id number] [phone]            Nov 13, 2017 12:15 PM CST   LAB with  LAB   [facility] ([facility])    [address] [place] [address]  1st Floor  [facility] [id number] [phone]           Please do not eat 10-12 hours before your appointment if you are coming in fasting for labs on lipids, cholesterol, or glucose (sugar). This does not apply to pregnant women. Water, hot tea and black coffee (with nothing added) are okay. Do not drink other fluids, diet soda or chew gum.            Nov 14, 2017   Procedure with Carlitos Woodard MD   [facility] ([facility])    [address] [place] [address]  [address]th [facility] [id number] [phone]           Located in the Clinics and Surgery Center at [address] [place] [address].   parking is very convenient and highly recommended.  is a $6 flat rate fee.  Both  and self parkers should enter the main arrival plaza from [facility]; parking attendants will direct you based on your parking preference.              Who to contact     Please call your clinic at [phone] to:    Ask questions about your health    Make or cancel appointments    Discuss your medicines    Learn about your test results    Speak to your doctor   If you have compliments or concerns about an experience at your clinic, or if you wish to file a complaint, please contact [facility] Patient Relations at [phone] or email us at  [email or web address]         Additional Information About Your Visit        [employer] Information     [employer] is an electronic gateway that provides easy, online access to your medical records. With [employer], you can request a clinic appointment, read your test results, renew a prescription or communicate with your care team.     To sign up for [employer] visit the website at [email or web address]   You will be asked to enter the access code listed below, as well as some personal information. Please follow the directions to create your username and password.     Your access code is: [email or web address]  Expires: 2018  3:06 PM     Your access code will  in 90 days. If you need help or a new code, please contact your [facility] or call [phone] for assistance.        Care EveryWhere ID     This is your Care EveryWhere ID. This could be used by other organizations to access your [place] medical records  [id number]         Blood Pressure from Last 3 Encounters:   17 156/85    Weight from Last 3 Encounters:   17 96.2 kg (212 lb)   17 97.1 kg (214 lb)   17 97.1 kg (214 lb)              Today, you had the following     No orders found for display       Primary Care Provider    Physician No Ref-Primary       NO REF-PRIMARY PHYSICIAN        Equal Access to Services     [facility]: Hadii aad ku hadasho Soomaali, waaxda luqadaha, qaybta kaalmada aderios, sandra waters . So [facility] [phone].    ATENCIÓN: Si habla español, tiene a otero disposición servicios gratuitos de asistencia lingüística. Llame al [phone].    We comply with applicable federal civil rights laws and [place] laws. We do not discriminate on the basis of race, color, national origin, age, disability, sex, sexual orientation, or gender identity.            Thank you!     Thank you for choosing [facility]OPER[facility] [place]  for your care.  Our goal is always to provide you with excellent care. Hearing back from our patients is one way we can continue to improve our services. Please take a few minutes to complete the written survey that you may receive in the mail after your visit with us. Thank you!             Your Updated Medication List - Protect others around you: Learn how to safely use, store and throw away your medicines at www.disposemymeds.org.          This list is accurate as of: 11/13/17 11:16 AM.  Always use your most recent med list.                   Brand Name Dispense Instructions for use Diagnosis    oxyCODONE IR 5 MG tablet    ROXICODONE    10 tablet    Take 1 tablet (5 mg) by mouth every 6 hours as needed for pain

## 2017-11-14 ENCOUNTER — SURGERY (OUTPATIENT)
Age: 52
End: 2017-11-14

## 2017-11-14 ENCOUNTER — HOSPITAL ENCOUNTER (OUTPATIENT)
Facility: AMBULATORY SURGERY CENTER | Age: 52
End: 2017-11-14

## 2017-11-14 ENCOUNTER — ANESTHESIA (OUTPATIENT)
Dept: SURGERY | Facility: AMBULATORY SURGERY CENTER | Age: 52
End: 2017-11-14

## 2017-11-14 VITALS
WEIGHT: 212 LBS | BODY MASS INDEX: 28.71 KG/M2 | RESPIRATION RATE: 15 BRPM | HEART RATE: 69 BPM | TEMPERATURE: 97.8 F | DIASTOLIC BLOOD PRESSURE: 83 MMHG | OXYGEN SATURATION: 99 % | HEIGHT: 72 IN | SYSTOLIC BLOOD PRESSURE: 128 MMHG

## 2017-11-14 DIAGNOSIS — S52.571A OTHER CLOSED INTRA-ARTICULAR FRACTURE OF DISTAL END OF RIGHT RADIUS, INITIAL ENCOUNTER: Primary | ICD-10-CM

## 2017-11-14 DEVICE — IMP PEG FX 2.3X20MM CORT SMTH: Type: IMPLANTABLE DEVICE | Site: RADIUS | Status: FUNCTIONAL

## 2017-11-14 DEVICE — IMPLANTABLE DEVICE: Type: IMPLANTABLE DEVICE | Site: RADIUS | Status: FUNCTIONAL

## 2017-11-14 RX ORDER — FLUMAZENIL 0.1 MG/ML
0.2 INJECTION, SOLUTION INTRAVENOUS
Status: DISCONTINUED | OUTPATIENT
Start: 2017-11-14 | End: 2017-11-14 | Stop reason: HOSPADM

## 2017-11-14 RX ORDER — SODIUM CHLORIDE, SODIUM LACTATE, POTASSIUM CHLORIDE, CALCIUM CHLORIDE 600; 310; 30; 20 MG/100ML; MG/100ML; MG/100ML; MG/100ML
INJECTION, SOLUTION INTRAVENOUS CONTINUOUS
Status: DISCONTINUED | OUTPATIENT
Start: 2017-11-14 | End: 2017-11-15 | Stop reason: HOSPADM

## 2017-11-14 RX ORDER — SODIUM CHLORIDE, SODIUM LACTATE, POTASSIUM CHLORIDE, CALCIUM CHLORIDE 600; 310; 30; 20 MG/100ML; MG/100ML; MG/100ML; MG/100ML
INJECTION, SOLUTION INTRAVENOUS CONTINUOUS
Status: DISCONTINUED | OUTPATIENT
Start: 2017-11-14 | End: 2017-11-14 | Stop reason: HOSPADM

## 2017-11-14 RX ORDER — LIDOCAINE 40 MG/G
CREAM TOPICAL
Status: DISCONTINUED | OUTPATIENT
Start: 2017-11-14 | End: 2017-11-14 | Stop reason: HOSPADM

## 2017-11-14 RX ORDER — NALOXONE HYDROCHLORIDE 0.4 MG/ML
.1-.4 INJECTION, SOLUTION INTRAMUSCULAR; INTRAVENOUS; SUBCUTANEOUS
Status: DISCONTINUED | OUTPATIENT
Start: 2017-11-14 | End: 2017-11-14 | Stop reason: HOSPADM

## 2017-11-14 RX ORDER — OXYCODONE HYDROCHLORIDE 5 MG/1
5-10 TABLET ORAL
Status: DISCONTINUED | OUTPATIENT
Start: 2017-11-14 | End: 2017-11-15 | Stop reason: HOSPADM

## 2017-11-14 RX ORDER — PROPOFOL 10 MG/ML
INJECTION, EMULSION INTRAVENOUS PRN
Status: DISCONTINUED | OUTPATIENT
Start: 2017-11-14 | End: 2017-11-14

## 2017-11-14 RX ORDER — PROPOFOL 10 MG/ML
INJECTION, EMULSION INTRAVENOUS CONTINUOUS PRN
Status: DISCONTINUED | OUTPATIENT
Start: 2017-11-14 | End: 2017-11-14

## 2017-11-14 RX ORDER — FENTANYL CITRATE 50 UG/ML
25-50 INJECTION, SOLUTION INTRAMUSCULAR; INTRAVENOUS
Status: DISCONTINUED | OUTPATIENT
Start: 2017-11-14 | End: 2017-11-14 | Stop reason: HOSPADM

## 2017-11-14 RX ORDER — FENTANYL CITRATE 50 UG/ML
INJECTION, SOLUTION INTRAMUSCULAR; INTRAVENOUS PRN
Status: DISCONTINUED | OUTPATIENT
Start: 2017-11-14 | End: 2017-11-14

## 2017-11-14 RX ORDER — GABAPENTIN 300 MG/1
300 CAPSULE ORAL ONCE
Status: COMPLETED | OUTPATIENT
Start: 2017-11-14 | End: 2017-11-14

## 2017-11-14 RX ORDER — ACETAMINOPHEN 325 MG/1
650 TABLET ORAL
Status: DISCONTINUED | OUTPATIENT
Start: 2017-11-14 | End: 2017-11-15 | Stop reason: HOSPADM

## 2017-11-14 RX ORDER — ONDANSETRON 4 MG/1
4 TABLET, ORALLY DISINTEGRATING ORAL EVERY 30 MIN PRN
Status: DISCONTINUED | OUTPATIENT
Start: 2017-11-14 | End: 2017-11-15 | Stop reason: HOSPADM

## 2017-11-14 RX ORDER — CEFAZOLIN SODIUM 1 G/3ML
1 INJECTION, POWDER, FOR SOLUTION INTRAMUSCULAR; INTRAVENOUS SEE ADMIN INSTRUCTIONS
Status: DISCONTINUED | OUTPATIENT
Start: 2017-11-14 | End: 2017-11-14 | Stop reason: HOSPADM

## 2017-11-14 RX ORDER — NALOXONE HYDROCHLORIDE 0.4 MG/ML
.1-.4 INJECTION, SOLUTION INTRAMUSCULAR; INTRAVENOUS; SUBCUTANEOUS
Status: DISCONTINUED | OUTPATIENT
Start: 2017-11-14 | End: 2017-11-15 | Stop reason: HOSPADM

## 2017-11-14 RX ORDER — OXYCODONE HYDROCHLORIDE 5 MG/1
5-10 TABLET ORAL EVERY 6 HOURS PRN
Qty: 40 TABLET | Refills: 0 | Status: SHIPPED | OUTPATIENT
Start: 2017-11-14 | End: 2017-11-22

## 2017-11-14 RX ORDER — MEPERIDINE HYDROCHLORIDE 25 MG/ML
12.5 INJECTION INTRAMUSCULAR; INTRAVENOUS; SUBCUTANEOUS
Status: DISCONTINUED | OUTPATIENT
Start: 2017-11-14 | End: 2017-11-15 | Stop reason: HOSPADM

## 2017-11-14 RX ORDER — ONDANSETRON 2 MG/ML
4 INJECTION INTRAMUSCULAR; INTRAVENOUS EVERY 30 MIN PRN
Status: DISCONTINUED | OUTPATIENT
Start: 2017-11-14 | End: 2017-11-15 | Stop reason: HOSPADM

## 2017-11-14 RX ORDER — LIDOCAINE HYDROCHLORIDE 20 MG/ML
INJECTION, SOLUTION INFILTRATION; PERINEURAL PRN
Status: DISCONTINUED | OUTPATIENT
Start: 2017-11-14 | End: 2017-11-14

## 2017-11-14 RX ORDER — BUPIVACAINE HYDROCHLORIDE 5 MG/ML
INJECTION, SOLUTION EPIDURAL; INTRACAUDAL PRN
Status: DISCONTINUED | OUTPATIENT
Start: 2017-11-14 | End: 2017-11-14

## 2017-11-14 RX ORDER — ACETAMINOPHEN 325 MG/1
975 TABLET ORAL ONCE
Status: COMPLETED | OUTPATIENT
Start: 2017-11-14 | End: 2017-11-14

## 2017-11-14 RX ORDER — ASCORBIC ACID 500 MG
500 TABLET ORAL DAILY
Qty: 30 TABLET | Refills: 0 | Status: SHIPPED | OUTPATIENT
Start: 2017-11-14 | End: 2017-12-07

## 2017-11-14 RX ADMIN — FENTANYL CITRATE 25 MCG: 50 INJECTION, SOLUTION INTRAMUSCULAR; INTRAVENOUS at 07:34

## 2017-11-14 RX ADMIN — Medication 50 MCG: at 08:56

## 2017-11-14 RX ADMIN — BUPIVACAINE HYDROCHLORIDE 15 ML: 5 INJECTION, SOLUTION EPIDURAL; INTRACAUDAL at 07:34

## 2017-11-14 RX ADMIN — ACETAMINOPHEN 975 MG: 325 TABLET ORAL at 06:47

## 2017-11-14 RX ADMIN — LIDOCAINE HYDROCHLORIDE 60 MG: 20 INJECTION, SOLUTION INFILTRATION; PERINEURAL at 08:22

## 2017-11-14 RX ADMIN — FENTANYL CITRATE 25 MCG: 50 INJECTION, SOLUTION INTRAMUSCULAR; INTRAVENOUS at 08:30

## 2017-11-14 RX ADMIN — PROPOFOL: 10 INJECTION, EMULSION INTRAVENOUS at 10:05

## 2017-11-14 RX ADMIN — PROPOFOL 40 MG: 10 INJECTION, EMULSION INTRAVENOUS at 08:19

## 2017-11-14 RX ADMIN — SODIUM CHLORIDE, SODIUM LACTATE, POTASSIUM CHLORIDE, CALCIUM CHLORIDE: 600; 310; 30; 20 INJECTION, SOLUTION INTRAVENOUS at 07:32

## 2017-11-14 RX ADMIN — GABAPENTIN 300 MG: 300 CAPSULE ORAL at 06:47

## 2017-11-14 RX ADMIN — PROPOFOL 150 MCG/KG/MIN: 10 INJECTION, EMULSION INTRAVENOUS at 08:17

## 2017-11-14 RX ADMIN — Medication 100 MCG: at 09:04

## 2017-11-14 RX ADMIN — PROPOFOL: 10 INJECTION, EMULSION INTRAVENOUS at 09:05

## 2017-11-14 RX ADMIN — SODIUM CHLORIDE, SODIUM LACTATE, POTASSIUM CHLORIDE, CALCIUM CHLORIDE: 600; 310; 30; 20 INJECTION, SOLUTION INTRAVENOUS at 10:41

## 2017-11-14 NOTE — ANESTHESIA PROCEDURE NOTES
Peripheral Nerve Block Procedure Note    Staff:     Anesthesiologist:  SHIRLENE ADAME    Resident/CRNA:  VANGIE CESPEDES    Block performed by resident/CRNA in the presence of a teaching physician    Location: Pre-op  Procedure Start/Stop TImes:      11/14/2017 7:30 AM     11/14/2017 7:35 AM    patient identified, IV checked, site marked, risks and benefits discussed, informed consent, monitors and equipment checked, pre-op evaluation, at physician/surgeon's request and post-op pain management      Correct Patient: Yes      Correct Position: Yes      Correct Site: Yes      Correct Procedure: Yes      Correct Laterality:  Yes    Site Marked:  Yes  Procedure details:     Procedure:  Supraclavicular    Laterality:  Left    Position:  Sitting    Sterile Prep: chloraprep, mask and sterile gloves      Needle:  Short bevel and insulated    Needle gauge:  21    Needle length (inches):  3.13    Ultrasound: Yes      Ultrasound used to identify targeted nerve, plexus, or vascular structure and placed a needle adjacent to it      Permanent Image entered into patiient's record      Abnormal pain on injection: No      Blood Aspirated: No      Paresthesias:  No    Bleeding at site: No      Bolus via:  Needle    Infusion Method:  Single Shot    Complications:  None

## 2017-11-14 NOTE — OR NURSING
[facility] AMBULATORY SURGERY CENTER  [facility]  [address]  5th Floor  [place] [address]  [phone]  [phone]    2017    Justice Yee  [address]   [place] [place] [address]  [phone] (home)     :  1965    To Whom it May Concern:    The student missed school due to a family member having surgery today 17.    Please contact me for any questions or concerns.    Sincerely,      Laury LOVE

## 2017-11-14 NOTE — IP AVS SNAPSHOT
MRN:[id number]                      After Visit Summary   11/14/2017    Justice Yee    MRN: [id number]           Thank you!     Thank you for choosing [facility] for your care. Our goal is always to provide you with excellent care. Hearing back from our patients is one way we can continue to improve our services. Please take a few minutes to complete the written survey that you may receive in the mail after you visit with us. Thank you!        Patient Information     Date Of Birth          1965        About your hospital stay     You were admitted on:  November 14, 2017 You last received care in the:  [facility]    You were discharged on:  November 14, 2017       Who to Call     For medical emergencies, please call 911.  For non-urgent questions about your medical care, please call your primary care provider or clinic, None  For questions related to your surgery, please call your surgery clinic         Primary Care Provider    Physician No Ref-Primary      After Care Instructions     Discharge Instructions       Resume pre procedure diet            Discharge Instructions       No lifting with right hand until seen at Post-op follow up appointment            Discharge Instructions       Keep right hand elevated above heart level at all times. Keep right forearm splint clean and dry until seen in clinic. May shower with plastic bag over splint.            Discharge Instructions       Follow up with Surgeon on Thu 11/23/2017.  Call clinic with any problems.                  Further instructions from your care team       [facility]  Home Care Following Anesthesia  For 24 hours after surgery:  1. Get plenty of rest.  A responsible adult must stay with you for at least 24 hours after you leave the surgery center.  2. Do not drive or use heavy equipment.  If you have weakness or tingling, don't drive or use heavy equipment until this  feeling goes away.   3. Do not drink alcohol.   4. Avoid strenuous or risky activities.  Ask for help when climbing stairs.  5. You may feel lightheaded.  IF so, sit for a few minutes before standing.  Have someone help you get up.   6. If you have nausea (feel sick to your stomach): Drink only clear liquids such as apple juice, ginger ale, broth or 7-Up.  Rest may also help.  Be sure to drink enough fluids.  Move to a regular diet as you feel able.   7. You may have a slight fever.  Call the doctor if your fever is over 100 F (37.7 C) (taken under the tongue) or lasts longer than 24 hours.  8. You may have a dry mouth, a sore throat, muscle aches or trouble sleeping. These should go away after 24 hours.  9. Do not make important or legal decisions.     Tips for taking pain medications  To get the best pain relief possible, remember these points:    Take pain medications as directed, before pain becomes severe.    Pain medication can upset your stomach: taking it with food may help.    Constipation is a common side effect of pain medication. Drink plenty of  fluids.    Eat foods high in fiber. Take a stool softener if recommended by your doctor or pharmacist.    Do not drink alcohol, drive or operate machinery while taking pain medications.    Ask about other ways to control pain, such as with heat, ice or relaxation.    Tylenol/Acetaminophen Consumption  To help encourage the safe use of acetaminophen, the makers of TYLENOL  have lowered the maximum daily dose for single-ingredient Extra Strength TYLENOL  (acetaminophen) products sold in the [place] from 8 pills per day (4,000 mg) to 6 pills per day (3,000 mg). The dosing interval has also changed from 2 pills every 4-6 hours to 2 pills every 6 hours.    If you feel your pain relief is insufficient, you may take Tylenol/Acetaminophen in addition to your narcotic pain medication.     Be careful not to exceed 3,000 mg of Tylenol/Acetaminophen in a 24 hour period from  "all sources.    If you are taking extra strength Tylenol/acetaminophen (500 mg), the maximum dose is 6 tablets in 24 hours.    If you are taking regular strength acetaminophen (325 mg), the maximum dose is 9 tablets in 24 hours.    Call a doctor for any of the followin. Signs of infection (fever, growing tenderness at the surgery site, a large amount of drainage or bleeding, severe pain, foul-smelling drainage, redness, swelling).  2. It has been over 8 to 10 hours since surgery and you are still not able to urinate (pass water).  3. Headache for over 24 hours.  4. Numbness, tingling or weakness the day after surgery (if you had spinal anesthesia).  Your doctor is:     Dr. Carlitos Woodard, [phone]                    Or dial [phone] and ask for the resident on call for:  Orthopaedics  For emergency care, call the:  [facility] Emergency Department: [phone] (TTY for hearing impaired: [phone])    Information about liposomal bupivacaine (Exparel)    What is Liposomal Bupivacaine?    Liposomal Bupivacaine is a numbing medication that can help you manage your pain after surgery.  This medication is similar to \"novacaine,\" which is often used by the dentist.  Liposomal bupivacaine is released slowly and can help control pain for up to 72 hours.    What is the purpose of Liposomal Bupivacaine?    To manage your pain after surgery    To help you sleep better, take deep breaths, walk more comfortable, and feel up to visiting with others    How is the procedure done?    Liposomal bupivacaine is a medication given by an injection.    It is usually given right before your surgery.  If this is the case, you will be awake or sedated, but you should experience minimal pain during the procedure.    For some people, the injection may be given at the very end of your surgery.  It all depends on the type of surgery and your situation.    The procedure usually takes about 5-15 minutes.  An ultrasound machine will " help the anesthesiologist insert it in the right place or the surgeon will inject it under direct vision.     A needle is used to place the numbing medication under your skin.  It provides pain relief by numbing the tissue in the area where your surgeon will make the incision.    What can I expect?    You may experience numbness, tingling, or a feeling of heaviness around the area that was injected.    If you experience any of the follow symptoms IMMEDIATELY CALL THE REGIONAL ANESTHESIA PAIN SERVICE:    Numbness or tingling occurs in areas other than around the injection site    Blurry vision    Ringing in your ears    A metallic taste in your mouth    PAGE: Dial [phone].  When prompted, enter the following 4-digit ID number:  [id number].  You will be prompted to enter your phone number; and then enter the # sign.  The clinician on call will call you back.    OR    CALL: Dial [phone].  Let the hospital  know that you are having a problem with a nerve block and that you would like to speak to the regional anesthesia pain service right away.    You should not receive any other type of numbing medication within 4 days after receiving liposomal bupivacaine unless your anesthesiologist approves.      Post Operative Instructions: Regional Anesthetic for Upper Extremity with Liposomal Bupivacaine  General Information:   Regional anesthesia is when local anesthetic or  numbing  medication is injected around the nerves to anesthetize or  numb  the area supplied by that set of nerves. It is a type of analgesia used to control pain and decreases the need for narcotics following surgery.    Types of Regional Blocks:  Interscalene: A block injected into the neck on the operative shoulder/arm of a patient having shoulder surgery  Supraclavicular: A block injected near the clavicle on the operative shoulder of a patient having elbow, forearm, or hand surgery    Procedure:  The type of anesthesia your doctor used to  numb your shoulder or arm will usually not start to wear off for 24-48 hours, but may last as long as 72 hours. You should be careful during that period, since it is possible to injure your arm without being aware of the injury. While your arm is numb, you should:    Avoid striking or bumping your arm    Avoid extreme hot or cold    Diet:  There are no restrictions on your diet. You should drink plenty of fluids.     Discomfort:  You will have a tingling and prickly sensation in your arm as the feeling begins to return. You can also expect some discomfort. The amount of discomfort is unpredictable, but if you have more pain than can be controlled with pain medication you should notify your physician.     Pain Medications:  Begin taking your oral pain pills before bedtime and during the night to avoid a sudden onset of pain as part of the block wears off.  Do not engage in drinking, driving, or hazardous occupations while taking pain medication.     Stitches:   You may have stitches or special skin closures. You doctor will inform you when to return to the office to have them removed.     Activity:  On the day of surgery you should try to stay in bed with your hand elevated on pillows. You may resume your normal activity after that, wearing a sling for comfort. Contact your physician if you have any of the problems:     Continued numbness or tingling in the arm or hand after 72 hours    Swelling of the fingers or fingers that are cold to the touch    Excessive bleeding or drainage    Severe pain                Pending Results     Date and Time Order Name Status Description    11/14/2017 0814 XR SURGERY ATIF FLUORO LESS THAN 5 MIN W STILLS In process             Admission Information     Date & Time Department Dept. Phone    11/14/2017 [facility] [phone]      Your Vitals Were     Blood Pressure Pulse Temperature Respirations Height Weight    122/87 75 98.6  F (37  C) (Oral) 16 1.829 m  "(6' 0.01\") 96.2 kg (212 lb)    Pulse Oximetry BMI (Body Mass Index)                99% 28.75 kg/m2          [employer] Information     [address] [employer] is an electronic gateway that provides easy, online access to your medical records. With [address] [employer], you can request a clinic appointment, read your test results, renew a prescription or communicate with your care team.     To sign up for [address] [employer] visit the website at [email or web address]   You will be asked to enter the access code listed below, as well as some personal information. Please follow the directions to create your username and password.     Your access code is: [email or web address]  Expires: 2018  3:06 PM     Your access code will  in 90 days. If you need help or a new code, please contact your [facility] or call [phone] for assistance.        Care EveryWhere ID     This is your Care EveryWhere ID. This could be used by other organizations to access your [place] medical records  [id number]        Equal Access to Services     [facility] : Hadii orlando bah hadlindseyo Sorubén, waaxda luqadaha, qaybta kaalmada adeegfito, sandra waters . So [facility] [phone].    ATENCIÓN: Si habla español, tiene a otero disposición servicios gratuitos de asistencia lingüística. Llame al [phone].    We comply with applicable federal civil rights laws and [place] laws. We do not discriminate on the basis of race, color, national origin, age, disability, sex, sexual orientation, or gender identity.               Review of your medicines      CONTINUE these medicines which may have CHANGED, or have new prescriptions. If we are uncertain of the size of tablets/capsules you have at home, strength may be listed as something that might have changed.        Dose / Directions    oxyCODONE IR 5 MG tablet   Commonly known as:  ROXICODONE   This may have changed:  how much to take   Used for:  Other closed intra-articular fracture " of distal end of right radius, initial encounter        Dose:  5-10 mg   Take 1-2 tablets (5-10 mg) by mouth every 6 hours as needed for pain   Quantity:  40 tablet   Refills:  0         CONTINUE these medicines which have NOT CHANGED        Dose / Directions    TYLENOL PO        Dose:  320 mg   Take 320 mg by mouth   Refills:  0            Where to get your medicines      Some of these will need a paper prescription and others can be bought over the counter. Ask your nurse if you have questions.     Bring a paper prescription for each of these medications     oxyCODONE IR 5 MG tablet                Protect others around you: Learn how to safely use, store and throw away your medicines at www.disposemymeds.org.             Medication List: This is a list of all your medications and when to take them. Check marks below indicate your daily home schedule. Keep this list as a reference.      Medications           Morning Afternoon Evening Bedtime As Needed    oxyCODONE IR 5 MG tablet   Commonly known as:  ROXICODONE   Take 1-2 tablets (5-10 mg) by mouth every 6 hours as needed for pain                                TYLENOL PO   Take 320 mg by mouth   Last time this was given:  975 mg on 11/14/2017  6:47 AM

## 2017-11-14 NOTE — IP AVS SNAPSHOT
[facility]    [address] [place] [address]    Phone:  [phone]    Fax:  [phone]                                       After Visit Summary   11/14/2017    Justice Yee    MRN: [id number]           After Visit Summary Signature Page     I have received my discharge instructions, and my questions have been answered. I have discussed any challenges I see with this plan with the nurse or doctor.    ..........................................................................................................................................  Patient/Patient Representative Signature      ..........................................................................................................................................  Patient Representative Print Name and Relationship to Patient    ..................................................               ................................................  Date                                            Time    ..........................................................................................................................................  Reviewed by Signature/Title    ...................................................              ..............................................  Date                                                            Time

## 2017-11-14 NOTE — ANESTHESIA POSTPROCEDURE EVALUATION
Patient: Justice Yee    Procedure(s):  Open Reduction Internal Fixation of Right Distal Radius Fracture - Wound Class: I-Clean    Diagnosis:Right Distal Radius Fracture  Diagnosis Additional Information: No value filed.    Anesthesia Type:  MAC, Peripheral Nerve Block    Note:  Anesthesia Post Evaluation    Patient location during evaluation: bedside  Patient participation: Able to participate in evaluation but full recovery from regional anesthesia has not yet occurred and is not anticipated to occur within 48 hours  Level of consciousness: awake  Pain management: adequate  Airway patency: patent  Cardiovascular status: acceptable  Respiratory status: acceptable  Hydration status: acceptable  PONV: controlled     Anesthetic complications: None          Last vitals:  Vitals:    11/14/17 1059 11/14/17 1108 11/14/17 1128   BP: 127/76 120/80 128/83   Pulse: 91 87 69   Resp: 15     Temp: 36.8  C (98.2  F)  36.6  C (97.8  F)   SpO2: 96% 96% 99%         Electronically Signed By: Lamine Delarosa MD, MD  November 14, 2017  1:13 PM

## 2017-11-14 NOTE — DISCHARGE INSTRUCTIONS
[facility]  Home Care Following Anesthesia  For 24 hours after surgery:  1. Get plenty of rest.  A responsible adult must stay with you for at least 24 hours after you leave the surgery center.  2. Do not drive or use heavy equipment.  If you have weakness or tingling, don't drive or use heavy equipment until this feeling goes away.   3. Do not drink alcohol.   4. Avoid strenuous or risky activities.  Ask for help when climbing stairs.  5. You may feel lightheaded.  IF so, sit for a few minutes before standing.  Have someone help you get up.   6. If you have nausea (feel sick to your stomach): Drink only clear liquids such as apple juice, ginger ale, broth or 7-Up.  Rest may also help.  Be sure to drink enough fluids.  Move to a regular diet as you feel able.   7. You may have a slight fever.  Call the doctor if your fever is over 100 F (37.7 C) (taken under the tongue) or lasts longer than 24 hours.  8. You may have a dry mouth, a sore throat, muscle aches or trouble sleeping. These should go away after 24 hours.  9. Do not make important or legal decisions.     Tips for taking pain medications  To get the best pain relief possible, remember these points:    Take pain medications as directed, before pain becomes severe.    Pain medication can upset your stomach: taking it with food may help.    Constipation is a common side effect of pain medication. Drink plenty of  fluids.    Eat foods high in fiber. Take a stool softener if recommended by your doctor or pharmacist.    Do not drink alcohol, drive or operate machinery while taking pain medications.    Ask about other ways to control pain, such as with heat, ice or relaxation.    Tylenol/Acetaminophen Consumption  To help encourage the safe use of acetaminophen, the makers of TYLENOL  have lowered the maximum daily dose for single-ingredient Extra Strength TYLENOL  (acetaminophen) products sold in the [place] from 8 pills per day  "(4,000 mg) to 6 pills per day (3,000 mg). The dosing interval has also changed from 2 pills every 4-6 hours to 2 pills every 6 hours.    If you feel your pain relief is insufficient, you may take Tylenol/Acetaminophen in addition to your narcotic pain medication.     Be careful not to exceed 3,000 mg of Tylenol/Acetaminophen in a 24 hour period from all sources.    If you are taking extra strength Tylenol/acetaminophen (500 mg), the maximum dose is 6 tablets in 24 hours.    If you are taking regular strength acetaminophen (325 mg), the maximum dose is 9 tablets in 24 hours.    Call a doctor for any of the followin. Signs of infection (fever, growing tenderness at the surgery site, a large amount of drainage or bleeding, severe pain, foul-smelling drainage, redness, swelling).  2. It has been over 8 to 10 hours since surgery and you are still not able to urinate (pass water).  3. Headache for over 24 hours.  4. Numbness, tingling or weakness the day after surgery (if you had spinal anesthesia).  Your doctor is:     Dr. Carlitos Woodard, [phone]                    Or dial [phone] and ask for the resident on call for:  Orthopaedics  For emergency care, call the:  [facility] Emergency Department: [phone] (TTY for hearing impaired: [phone])    Information about liposomal bupivacaine (Exparel)    What is Liposomal Bupivacaine?    Liposomal Bupivacaine is a numbing medication that can help you manage your pain after surgery.  This medication is similar to \"novacaine,\" which is often used by the dentist.  Liposomal bupivacaine is released slowly and can help control pain for up to 72 hours.    What is the purpose of Liposomal Bupivacaine?    To manage your pain after surgery    To help you sleep better, take deep breaths, walk more comfortable, and feel up to visiting with others    How is the procedure done?    Liposomal bupivacaine is a medication given by an injection.    It is usually given right " before your surgery.  If this is the case, you will be awake or sedated, but you should experience minimal pain during the procedure.    For some people, the injection may be given at the very end of your surgery.  It all depends on the type of surgery and your situation.    The procedure usually takes about 5-15 minutes.  An ultrasound machine will help the anesthesiologist insert it in the right place or the surgeon will inject it under direct vision.     A needle is used to place the numbing medication under your skin.  It provides pain relief by numbing the tissue in the area where your surgeon will make the incision.    What can I expect?    You may experience numbness, tingling, or a feeling of heaviness around the area that was injected.    If you experience any of the follow symptoms IMMEDIATELY CALL THE REGIONAL ANESTHESIA PAIN SERVICE:    Numbness or tingling occurs in areas other than around the injection site    Blurry vision    Ringing in your ears    A metallic taste in your mouth    PAGE: Dial [phone].  When prompted, enter the following 4-digit ID number:  [id number].  You will be prompted to enter your phone number; and then enter the # sign.  The clinician on call will call you back.    OR    CALL: Dial [phone].  Let the hospital  know that you are having a problem with a nerve block and that you would like to speak to the regional anesthesia pain service right away.    You should not receive any other type of numbing medication within 4 days after receiving liposomal bupivacaine unless your anesthesiologist approves.      Post Operative Instructions: Regional Anesthetic for Upper Extremity with Liposomal Bupivacaine  General Information:   Regional anesthesia is when local anesthetic or  numbing  medication is injected around the nerves to anesthetize or  numb  the area supplied by that set of nerves. It is a type of analgesia used to control pain and decreases the need for narcotics  following surgery.    Types of Regional Blocks:  Interscalene: A block injected into the neck on the operative shoulder/arm of a patient having shoulder surgery  Supraclavicular: A block injected near the clavicle on the operative shoulder of a patient having elbow, forearm, or hand surgery    Procedure:  The type of anesthesia your doctor used to numb your shoulder or arm will usually not start to wear off for 24-48 hours, but may last as long as 72 hours. You should be careful during that period, since it is possible to injure your arm without being aware of the injury. While your arm is numb, you should:    Avoid striking or bumping your arm    Avoid extreme hot or cold    Diet:  There are no restrictions on your diet. You should drink plenty of fluids.     Discomfort:  You will have a tingling and prickly sensation in your arm as the feeling begins to return. You can also expect some discomfort. The amount of discomfort is unpredictable, but if you have more pain than can be controlled with pain medication you should notify your physician.     Pain Medications:  Begin taking your oral pain pills before bedtime and during the night to avoid a sudden onset of pain as part of the block wears off.  Do not engage in drinking, driving, or hazardous occupations while taking pain medication.     Stitches:   You may have stitches or special skin closures. You doctor will inform you when to return to the office to have them removed.     Activity:  On the day of surgery you should try to stay in bed with your hand elevated on pillows. You may resume your normal activity after that, wearing a sling for comfort. Contact your physician if you have any of the problems:     Continued numbness or tingling in the arm or hand after 72 hours    Swelling of the fingers or fingers that are cold to the touch    Excessive bleeding or drainage    Severe pain

## 2017-11-14 NOTE — ANESTHESIA CARE TRANSFER NOTE
Patient: Justice Yee    Procedure(s):  Open Reduction Internal Fixation of Right Distal Radius Fracture - Wound Class: I-Clean    Diagnosis: Right Distal Radius Fracture  Diagnosis Additional Information: No value filed.    Anesthesia Type:   MAC, Peripheral Nerve Block     Note:  Airway :Room Air  Patient transferred to:Phase II  Handoff Report: Identifed the Patient, Identified the Reponsible Provider, Reviewed the pertinent medical history, Discussed the surgical course, Reviewed Intra-OP anesthesia mangement and issues during anesthesia, Set expectations for post-procedure period and Allowed opportunity for questions and acknowledgement of understanding      Vitals: (Last set prior to Anesthesia Care Transfer)    CRNA VITALS  11/14/2017 1026 - 11/14/2017 1100      11/14/2017             Pulse: 96    SpO2: 98 %    Resp Rate (set): 10                Electronically Signed By: LARA Oliver CRNA  November 14, 2017  11:00 AM

## 2017-11-14 NOTE — PROGRESS NOTES
Pt received a right supraclavicular nerve block with exparel. Tolerated well with 25 mcg fentanyl and 1 mg versed. VSS. Isabel 10.

## 2017-11-14 NOTE — OP NOTE
DATE OF OPERATION: 11/14/2017    PREOPERATIVE DIAGNOSIS: Right displaced intra-articular distal radius fracture and ulnar styloid fracture.    POSTOPERATIVE DIAGNOSIS: Right displaced intra-articular two fragment distal radius fracture and ulnar styloid fracture.    PROCEDURES PERFORMED: Open reduction internal fixation of right displaced intra-articular two fragment distal radius fracture.    SURGEON: Carlitos Woodard MD    ASSISTANT: Deshaun Diaz MD    ANESTHESIA: Monitored anesthesia care with a regional block.    ESTIMATED BLOOD LOSS: 20 mL    TOURNIQUET TIME: 103 minutes.    FINDINGS: Right distal radius fracture was displaced, intra-articular, and in two fragments.    SPECIMENS: None    COMPLICATIONS: None    DRAINS: None    IMPLANTS: Acumed distal radius volar plate    INDICATIONS: Patient is a 52 year old [employer]  who on 11/7/2017 sustained a work related injury when he fell off a ladder. He landed on his outstretched R hand and sustained a distal radius fracture. This was reduced and splinted urgently, and the patient was seen in our clinic afterwards. The reduction was inadequate as there was a larger than 2 mm gap in the articular surface with a displaced radial styloid. Open reduction and internal fixation was recommended. Patient understood the risks and wished to proceed with surgery.    DESCRIPTION OF PROCEDURE: Patient was greeted in the preoperative holding area and his informed consent was reviewed. He had no further questions. Right hand was marked and he underwent a regional block. He was then taken to the operating room where he was placed in a supine position with his right hand out on a hand table. Preoperative antibiotics were given. Sedation was achieved. The existing splint was removed, tourniquet applied to the right upper am over padding, and right upper extremity was prepped and draped in a sterile fashion. A timeout was performed. Regional block was tested. A linear incision  along the FCR tendon was drawn. The right arm was exsanguinated and tourniquet was brought to 250. The proposed incision was made with a scalpel and taken through the FCR tendon sheath, between the radial artery radially and tendons ulnarly. The pronator quadratus muscle was identified and peeled off the radius from radial to ulnar using a periosteal elevator. The fracture was identified and found to be intra-articular and in two fragments. The ulnar column fragment was already well reduced. The radial styloid was displaced radially and proximally. Brachioradialis was bluntly detached from the radial styloid piece and the radial styloid fragment was reduced using manual pressure, longitudinal hand traction, and also volar flexion of the wrist. Radial height, radial inclination, and volar tilt were restored on fluoroscopic imaging. A volar plate with standard width and three holes proximally was picked and temporarily placed with K-wires. Good placement was confirmed and a proximal screw was placed in the oval hole. A K-wire along the distal plate was placed and confirmed to not be in the joint on lateral imaging. A guide block was used to drill and fill the ulnar column distal holes. Locking pegs were used for these. Lateral imaging was used to confirm no violation of the radiocarpal joint. While keeping the radial styloid piece well reduced, the radial sided holes were drilled up towards the styloid and filled with threaded locking screws. Repeat lateral imaging confirmed the radiocarpal joint was not involved with the screws and that the distal screws were not too long but spanning at least 75% of the joint surface. The proximal holes were then drilled and bicortical nonlocking screws were placed. All screws were tightened. Distal radioulnar joint was interrogated with piano key maneuver with wrist both in pronation and supination. The joint was stable and therefore decision was made not to manipulate the ulnar  styloid fracture. Wound was irrigated. Pronator quadratus was placed over the volar plate and sutured with 2-0 monocryl. Tourniquet was brought down, and tourniquet time was 103 minutes. Final images were obtained and saved. Hemostasis was achieved with bipolar cautery. Wound was closed with 3-0 monocryl for the deep dermis and 4-0 chromic for the skin in a running fashion. A volarly based short arm splint was applied. All digits were well perfused at the end of the case. All counts were correct at the end of the case. Patient was then awakened and transferred to the postoperative area in stable condition.    DISPOSITION: Home

## 2017-11-14 NOTE — BRIEF OP NOTE
[facility]    Brief Operative Note    Pre-operative diagnosis: Right Distal Radius Fracture  Post-operative diagnosis Same  Procedure: Procedure(s):  Open Reduction Internal Fixation of Right Distal Radius Fracture - Wound Class: I-Clean  Surgeon: Surgeon(s) and Role:     * Carlitos Woodard MD - Primary  Anesthesia: Monitor Anesthesia Care   Estimated blood loss: Less than 10 ml  Drains: None  Specimens: * No specimens in log *  Findings:   None.  Complications: None.  Implants: Volar plate    Deshaun Borad PGY-3  Plastic Surgery  [phone]

## 2017-11-22 ENCOUNTER — OFFICE VISIT (OUTPATIENT)
Dept: ORTHOPEDICS | Facility: CLINIC | Age: 52
End: 2017-11-22

## 2017-11-22 DIAGNOSIS — S52.571D OTHER CLOSED INTRA-ARTICULAR FRACTURE OF DISTAL END OF RIGHT RADIUS WITH ROUTINE HEALING, SUBSEQUENT ENCOUNTER: Primary | ICD-10-CM

## 2017-11-22 DIAGNOSIS — S52.571A OTHER CLOSED INTRA-ARTICULAR FRACTURE OF DISTAL END OF RIGHT RADIUS, INITIAL ENCOUNTER: ICD-10-CM

## 2017-11-22 RX ORDER — DIPHENHYDRAMINE HCL 25 MG
25 TABLET ORAL EVERY 6 HOURS PRN
Qty: 56 TABLET | Refills: 0 | Status: SHIPPED | OUTPATIENT
Start: 2017-11-22 | End: 2017-12-06

## 2017-11-22 RX ORDER — OXYCODONE HYDROCHLORIDE 5 MG/1
5-10 TABLET ORAL EVERY 6 HOURS PRN
Qty: 25 TABLET | Refills: 0 | Status: SHIPPED | OUTPATIENT
Start: 2017-11-22 | End: 2017-12-06

## 2017-11-22 NOTE — LETTER
Date:November 27, 2017      Patient was self referred, no letter generated. Do not send.        [facility] Health Information

## 2017-11-22 NOTE — MR AVS SNAPSHOT
After Visit Summary   2017    Justice Yee    MRN: [id number]           Patient Information     Date Of Birth          1965        Visit Information        Provider Department      2017 11:00 AM Carlitos Woodard MD [facility]        Today's Diagnoses     Other closed intra-articular fracture of distal end of right radius with routine healing, subsequent encounter    -  1    Other closed intra-articular fracture of distal end of right radius, initial encounter           Follow-ups after your visit        Follow-up notes from your care team     Return in about 2 weeks (around 2017).      Who to contact     Please call your clinic at [phone] to:    Ask questions about your health    Make or cancel appointments    Discuss your medicines    Learn about your test results    Speak to your doctor   If you have compliments or concerns about an experience at your clinic, or if you wish to file a complaint, please contact [facility] Patient Relations at [phone] or email us at [email or web address]         Additional Information About Your Visit        MyChart Information     [employer] is an electronic gateway that provides easy, online access to your medical records. With [employer] Seeking, you can request a clinic appointment, read your test results, renew a prescription or communicate with your care team.     To sign up for [employer]eking visit the website at [email or web address]   You will be asked to enter the access code listed below, as well as some personal information. Please follow the directions to create your username and password.     Your access code is: [email or web address]  Expires: 2018  3:06 PM     Your access code will  in 90 days. If you need help or a new code, please contact your [facility] or call [phone] for assistance.        Care EveryWhere ID     This is your Care EveryWhere  ID. This could be used by other organizations to access your [facility] medical records  [id number]         Blood Pressure from Last 3 Encounters:   11/14/17 128/83   11/13/17 156/85    Weight from Last 3 Encounters:   11/14/17 212 lb   11/13/17 212 lb   11/09/17 214 lb                 Today's Medication Changes          These changes are accurate as of: 11/22/17 11:41 AM.  If you have any questions, ask your nurse or doctor.               Start taking these medicines.        Dose/Directions    diphenhydrAMINE 25 MG tablet   Commonly known as:  BENADRYL   Used for:  Other closed intra-articular fracture of distal end of right radius with routine healing, subsequent encounter   Started by:  Carlitos Woodard MD        Dose:  25 mg   Take 1 tablet (25 mg) by mouth every 6 hours as needed for itching or allergies   Quantity:  56 tablet   Refills:  0            Where to get your medicines      These medications were sent to [address] [place] [address] [address] [address]    Hours:  TRANSPLANT PHONE NUMBER [phone] Phone:  [phone]     diphenhydrAMINE 25 MG tablet         Some of these will need a paper prescription and others can be bought over the counter.  Ask your nurse if you have questions.     Bring a paper prescription for each of these medications     oxyCODONE IR 5 MG tablet                Primary Care Provider    Physician No Ref-Primary       NO REF-PRIMARY PHYSICIAN        Equal Access to Services     DANIEL HANSEN AH: Hadii orlando sanchez Sorubén, waaxda luqadaha, qaybta kaalmada virgilio, sandra vela. So [facility] [phone].    ATENCIÓN: Si habla español, tiene a otero disposición servicios gratuitos de asistencia lingüística. Llame al [phone].    We comply with applicable federal civil rights laws and [place] laws. We do not discriminate on the basis of race, color, national origin,  age, disability, sex, sexual orientation, or gender identity.            Thank you!     Thank you for choosing [facility]  for your care. Our goal is always to provide you with excellent care. Hearing back from our patients is one way we can continue to improve our services. Please take a few minutes to complete the written survey that you may receive in the mail after your visit with us. Thank you!             Your Updated Medication List - Protect others around you: Learn how to safely use, store and throw away your medicines at www.disposemymeds.org.          This list is accurate as of: 11/22/17 11:41 AM.  Always use your most recent med list.                   Brand Name Dispense Instructions for use Diagnosis    ascorbic acid 500 MG tablet    VITAMIN C    30 tablet    Take 1 tablet (500 mg) by mouth daily    Other closed intra-articular fracture of distal end of right radius, initial encounter       diphenhydrAMINE 25 MG tablet    BENADRYL    56 tablet    Take 1 tablet (25 mg) by mouth every 6 hours as needed for itching or allergies    Other closed intra-articular fracture of distal end of right radius with routine healing, subsequent encounter       oxyCODONE IR 5 MG tablet    ROXICODONE    25 tablet    Take 1-2 tablets (5-10 mg) by mouth every 6 hours as needed for pain    Other closed intra-articular fracture of distal end of right radius, initial encounter       TYLENOL PO      Take 320 mg by mouth

## 2017-11-22 NOTE — LETTER
11/22/2017       RE: Justice Yee  [address]   [facility] [address]     Dear Colleague,    Thank you for referring your patient, Justice Yee, to the [facility]TH[facility] at [facility]. Please see a copy of my visit note below.    Patient returns for postoperative follow-up after open reduction internal fixation of right distal radius intra-articular fracture.    INTERVAL HISTORY: Patient reports still having postoperative right wrist pain not controlled with Tylenol and ibuprofen. He also developed a rash following the operation affecting his right upper extremity and back. This caused itching that would wake the patient up in the middle night. Otherwise he is doing well. Denies any fevers. Denies any numbness or tingling in his right fingers.    PHYSICAL EXAMINATION:  General: In no acute distress.  On exam of the right upper extremity out of the splint he has minimal swelling of his right hand. There is no visible rash along the right upper extremity but I do see signs scratching. The volar wrist incision is clean dry and intact with sutures intact. There is no surrounding erythema or drainage. His sensation to light touch is intact over the right dorsal hand, thumb tip, index fingertip, and small fingertip. He is able to wiggle his fingers. Extensor pollicis longus and flexor pollicis longus tendons are intact.    IMAGING: X-ray of the right wrist was taken out of the splint. This revealed stable fracture reduction and intact hardware.    ASSESSMENT: Doing well 8 days following open reduction internal fixation of right distal radius intra-articular fracture with volar plate. Still having postoperative pain and likely an allergic reaction to the ChloraPrep used at surgery.    PLAN: We will place a right short arm cast but keeping the MP joints free. He is encouraged to move his fingers and thumb. Patient understands the keep his right wrist elevated  above heart level at all times. I prescribed a two-week course of oxycodone for pain and Benadryl for the itching. The patient's workplace is not able to provide a working situation where he is able to rest his right hand elevated above the heart level and remain in a clean dry environment. Therefore the patient will not return to work at this point. Patient will return to see me in 2 weeks at which time we will consider taking him out of the cast, placing him in a removable wrist splint, and beginning motion.    Total time spent with patient was 15 min of which greater than 50% was in counseling.    Again, thank you for allowing me to participate in the care of your patient.      Sincerely,    Carlitos Woodard MD

## 2017-11-22 NOTE — NURSING NOTE
Pain is at level 4/0-10 scale. States he has a rash on his back and arms since surgery. Nothing makes it worse.   Reason For Visit:   Chief Complaint   Patient presents with     Surgical Followup       Primary MD: No Ref-Primary, Physician    Age: 52 year old    ?  No      There were no vitals taken for this visit.      Pain Assessment  Patient Currently in Pain: Yes  Patient's Stated Pain Goal: 4  0-10 Pain Scale: 4  Primary Pain Location: Wrist  Pain Orientation: Right  Pain Descriptors: Aching  Alleviating Factors: Rest, Ice, Pain medication  Aggravating Factors: Movement  Detailed Pain Assessment Needed: No               QuickDASH Assessment  No flowsheet data found.       Current Outpatient Prescriptions   Medication Sig Dispense Refill     diphenhydrAMINE (BENADRYL) 25 MG tablet Take 1 tablet (25 mg) by mouth every 6 hours as needed for itching or allergies 56 tablet 0     oxyCODONE IR (ROXICODONE) 5 MG tablet Take 1-2 tablets (5-10 mg) by mouth every 6 hours as needed for pain 25 tablet 0     Acetaminophen (TYLENOL PO) Take 320 mg by mouth       ascorbic acid (VITAMIN C) 500 MG tablet Take 1 tablet (500 mg) by mouth daily (Patient not taking: Reported on 11/22/2017) 30 tablet 0       No Known Allergies    Samantha Villanueva RN            Relevant Diagnosis: Right distal radius fracture    short arm application and care    Type of Cast applied: short arm   Cast/Splinting material used: Fiberglass, blue    Person(s) involved in teaching:   Patient     Motivation Level:  Asks Questions: Yes  Eager to Learn: Yes  Cooperative: Yes  Receptive (willing/able to accept information): Yes     Patient demonstrates understanding of the following:   Reason for the appointment, diagnosis and treatment plan: Yes    Which situations necessitate calling provider and whom to contact: Yes     Teaching Concerns Addressed:   Instructed on cast care     Proper use and care of short arm cast: Yes  Pain management  techniques: Yes  Wound Care: Yes  How and/when to access community resources: Yes     Cast was applied in standard Manner:  Yes  Cast fit well:  Yes  Patient reports cast to fit comfortably:  Yes    Instructional Materials Used/Given: Verbal instructions

## 2017-11-22 NOTE — PROGRESS NOTES
Patient returns for postoperative follow-up after open reduction internal fixation of right distal radius intra-articular fracture.    INTERVAL HISTORY: Patient reports still having postoperative right wrist pain not controlled with Tylenol and ibuprofen. He also developed a rash following the operation affecting his right upper extremity and back. This caused itching that would wake the patient up in the middle night. Otherwise he is doing well. Denies any fevers. Denies any numbness or tingling in his right fingers.    PHYSICAL EXAMINATION:  General: In no acute distress.  On exam of the right upper extremity out of the splint he has minimal swelling of his right hand. There is no visible rash along the right upper extremity but I do see signs scratching. The volar wrist incision is clean dry and intact with sutures intact. There is no surrounding erythema or drainage. His sensation to light touch is intact over the right dorsal hand, thumb tip, index fingertip, and small fingertip. He is able to wiggle his fingers. Extensor pollicis longus and flexor pollicis longus tendons are intact.    IMAGING: X-ray of the right wrist was taken out of the splint. This revealed stable fracture reduction and intact hardware.    ASSESSMENT: Doing well 8 days following open reduction internal fixation of right distal radius intra-articular fracture with volar plate. Still having postoperative pain and likely an allergic reaction to the ChloraPrep used at surgery.    PLAN: We will place a right short arm cast but keeping the MP joints free. He is encouraged to move his fingers and thumb. Patient understands the keep his right wrist elevated above heart level at all times. I prescribed a two-week course of oxycodone for pain and Benadryl for the itching. The patient's workplace is not able to provide a working situation where he is able to rest his right hand elevated above the heart level and remain in a clean dry environment.  Therefore the patient will not return to work at this point. Patient will return to see me in 2 weeks at which time we will consider taking him out of the cast, placing him in a removable wrist splint, and beginning motion.    Total time spent with patient was 15 min of which greater than 50% was in counseling.

## 2017-11-24 DIAGNOSIS — S52.501A FRACTURE OF RIGHT DISTAL RADIUS: Primary | ICD-10-CM

## 2017-12-05 ENCOUNTER — DOCUMENTATION ONLY (OUTPATIENT)
Dept: ORTHOPEDICS | Facility: CLINIC | Age: 52
End: 2017-12-05

## 2017-12-05 NOTE — PROGRESS NOTES
Mr. Yee calls today requesting a workability letter be faxed to [phone].   The letter was faxed per patient request.

## 2017-12-07 ENCOUNTER — THERAPY VISIT (OUTPATIENT)
Dept: OCCUPATIONAL THERAPY | Facility: CLINIC | Age: 52
End: 2017-12-07
Payer: OTHER MISCELLANEOUS

## 2017-12-07 ENCOUNTER — OFFICE VISIT (OUTPATIENT)
Dept: ORTHOPEDICS | Facility: CLINIC | Age: 52
End: 2017-12-07

## 2017-12-07 DIAGNOSIS — M25.631 WRIST STIFFNESS, RIGHT: ICD-10-CM

## 2017-12-07 DIAGNOSIS — S52.571D OTHER INTRAARTICULAR FRACTURE OF LOWER END OF RIGHT RADIUS, SUBSEQUENT ENCOUNTER FOR CLOSED FRACTURE WITH ROUTINE HEALING: Primary | ICD-10-CM

## 2017-12-07 DIAGNOSIS — M25.641 STIFFNESS OF FINGER JOINT, RIGHT: ICD-10-CM

## 2017-12-07 DIAGNOSIS — S52.571D OTHER CLOSED INTRA-ARTICULAR FRACTURE OF DISTAL END OF RIGHT RADIUS WITH ROUTINE HEALING, SUBSEQUENT ENCOUNTER: Primary | ICD-10-CM

## 2017-12-07 PROCEDURE — 97165 OT EVAL LOW COMPLEX 30 MIN: CPT | Mod: GO | Performed by: OCCUPATIONAL THERAPIST

## 2017-12-07 PROCEDURE — 29125 APPL SHORT ARM SPLINT STATIC: CPT | Mod: GO | Performed by: OCCUPATIONAL THERAPIST

## 2017-12-07 NOTE — MR AVS SNAPSHOT
After Visit Summary   12/7/2017    Justice Yee    MRN: [id number]           Patient Information     Date Of Birth          1965        Visit Information        Provider Department      12/7/2017 5:30 PM Malou Lewis OTR [facility]        Today's Diagnoses     Other intraarticular fracture of lower end of right radius, subsequent encounter for closed fracture with routine healing    -  1    Wrist stiffness, right        Stiffness of finger joint, right           Follow-ups after your visit        Your next 10 appointments already scheduled     Dec 11, 2017 10:00 AM CST   [facility] Hand with [facility] ([facility])    [address] [place] [id number]   [phone]            Dec 13, 2017  8:30 AM CST   [facility] Hand with YANN Lewis [facility] ([facility])    [address] [place] [address]   [phone]            Dec 14, 2017  5:30 PM CST   (Arrive by 5:15 PM)   POST-OP HAND with Carlitos Woodard MD   [facility] ([facility])    [address] [place] [address]   [phone]            Dec 18, 2017  9:30 AM CST   [facility] Hand with YANN Mack [facility] ([facility])    [address] [place] [address]   [phone]            Dec 21, 2017  9:00 AM CST   [facility] Hand with YANN Mack [facility] ([facility])    [address] [place] [address]   [phone]            Dec 26, 2017  9:00 AM CST   SHALOM Hand with CARLIE Joy   [facility] ([facility])    [address] [place] [address]   [phone]            Dec 28, 2017  9:30 AM CST   [facility] Hand with Malou  "CARLIE Lewis    [facility] ([facility])    [address] [place] [id number] [phone]            Jan 02, 2018  9:00 AM CST   [facility] Hand with Margot Gonzalez OT    [facility] ([facility])    [address] [place] [address]   [phone]            Jan 04, 2018  9:30 AM CST   SHALOM Hand with CARLIE Joy   [facility] ([facility])    [address] [place] [id number] [phone]              Who to contact     If you have questions or need follow up information about today's clinic visit or your schedule please contact [facility] directly at [phone].  Normal or non-critical lab and imaging results will be communicated to you by MyChart, letter or phone within 4 business days after the clinic has received the results. If you do not hear from us within 7 days, please contact the clinic through Harry'shart or phone. If you have a critical or abnormal lab result, we will notify you by phone as soon as possible.  Submit refill requests through UGAME or call your pharmacy and they will forward the refill request to us. Please allow 3 business days for your refill to be completed.          Additional Information About Your Visit        Harry'shart Information     UGAME lets you send messages to your doctor, view your test results, renew your prescriptions, schedule appointments and more. To sign up, go to [email or web address] . Click on \"Log in\" on the left side of the screen, which will take you to the Welcome page. Then click on \"Sign up Now\" on the right side of the page.     You will be asked to enter the access code listed below, as well as some personal information. Please follow the directions to create your username and password.     Your access code is: [email or web address]  Expires: 2/5/2018  3:06 PM     Your " access code will  in 90 days. If you need help or a new code, please call your [facility] clinic or [phone].        Care EveryWhere ID     This is your Care EveryWhere ID. This could be used by other organizations to access your [facility] medical records  [id number]         Blood Pressure from Last 3 Encounters:   17 128/83   17 156/85    Weight from Last 3 Encounters:   17 96.2 kg (212 lb)   17 96.2 kg (212 lb)   17 97.1 kg (214 lb)              We Performed the Following     APPLY SHORT ARM SPLINT STATIC     HC OT EVAL, LOW COMPLEXITY        Primary Care Provider Fax #    Physician No Ref-Primary [phone]       No address on file        Equal Access to Services     DANIEL HANSEN : Zaid perezo Sorubén, waaxda luqadaha, qaybta kaalmada adeegyada, sandra waters . [facility] [phone].    ATENCIÓN: Si habla español, tiene a otero disposición servicios gratuitos de asistencia lingüística. Llame al [phone].    We comply with applicable federal civil rights laws and [place] laws. We do not discriminate on the basis of race, color, national origin, age, disability, sex, sexual orientation, or gender identity.            Thank you!     Thank you for choosing [facility]  for your care. Our goal is always to provide you with excellent care. Hearing back from our patients is one way we can continue to improve our services. Please take a few minutes to complete the written survey that you may receive in the mail after your visit with us. Thank you!             Your Updated Medication List - Protect others around you: Learn how to safely use, store and throw away your medicines at www.disposemymeds.org.          This list is accurate as of: 17 11:59 PM.  Always use your most recent med list.                   Brand Name Dispense Instructions for use Diagnosis    TYLENOL PO      Take 320 mg by mouth

## 2017-12-07 NOTE — LETTER
12/7/2017       RE: Justice Yee  [address]   [facility] [address]     Dear Colleague,    Thank you for referring your patient, Justice Yee, to the [facility] at [facility]. Please see a copy of my visit note below.    Patient returns for postoperative visit following open reduction internal fixation of a right distal radius fracture with a volar plate 3 weeks ago.    INTERVAL HISTORY: Patient presents with his Qualified Rehabilitation Consultant today due to his injury being work related in nature. Patient reports that he is still continuing to have some pain in the wrist within the cast.  For a little less than a week now, he has had numbness and tingling in the median nerve distribution: thumb, index finger, and long finger.    PHYSICAL EXAMINATION:  General: In no acute distress.  On exam of the right hand, the volar wrist incision is well-healed with no sign of infection such as erythema or drainage.  Extensor pollicis longus and flexor pollicis longus are intact to the thumb.  However, he is very stiff in all of his MP and IP joints.  He is not able to make a composite fist today.  Also, he reports dulled sensation to light touch along the median nerve distribution.  There is no thenar atrophy.  Sensation to light touch is intact along the proximal palm.    IMAGING: X-ray of his right wrist was obtained today.  This revealed stable fracture reduction and intact hardware.    ASSESSMENT: 3 weeks status post open reduction internal fixation of right distal radius fracture with a volar plate.  He has right hand stiffness and dull sensation to light touch along the median nerve distribution.    PLAN: I recommend hand therapy for this patient.  He will work on active range of motion and will obtain a thermoplastic splint that he will wear at all times other than when exercising.  As for the median nerve compressive symptoms, will see him back  next week to reevaluate.  Until then I have asked the patient to perform strict elevation of his right hand.  If symptoms continue he will require carpal tunnel release.  We will keep him out of work for another week.    Total time spent with patient was 15 min of which greater than 50% was in counseling.    Again, thank you for allowing me to participate in the care of your patient.      Sincerely,    Carlitos Woodard MD

## 2017-12-07 NOTE — LETTER
Date:December 8, 2017      Patient was self referred, no letter generated. Do not send.        [facility] Information

## 2017-12-07 NOTE — NURSING NOTE
Reason For Visit:   Chief Complaint   Patient presents with     Surgical Followup     ORIF right distal radius fracture POP.  DOS: 11/14/2017       Primary MD: No Ref-Primary, Physician  Ref. MD: Self    Age: 52 year old    ?  No      There were no vitals taken for this visit.      Pain Assessment  Patient Currently in Pain: Yes  0-10 Pain Scale: 5  Primary Pain Location: Elbow  Pain Orientation: Right  Pain Descriptors: Sharp  Alleviating Factors: Ice  Aggravating Factors: Bending               QuickDASH Assessment  No flowsheet data found.       Current Outpatient Prescriptions   Medication Sig Dispense Refill     Acetaminophen (TYLENOL PO) Take 320 mg by mouth         No Known Allergies    Paola Raman, ATC

## 2017-12-07 NOTE — PROGRESS NOTES
Patient returns for postoperative visit following open reduction internal fixation of a right distal radius fracture with a volar plate 3 weeks ago.    INTERVAL HISTORY: Patient presents with his Qualified Rehabilitation Consultant today due to his injury being work related in nature. Patient reports that he is still continuing to have some pain in the wrist within the cast.  For a little less than a week now, he has had numbness and tingling in the median nerve distribution: thumb, index finger, and long finger.    PHYSICAL EXAMINATION:  General: In no acute distress.  On exam of the right hand, the volar wrist incision is well-healed with no sign of infection such as erythema or drainage.  Extensor pollicis longus and flexor pollicis longus are intact to the thumb.  However, he is very stiff in all of his MP and IP joints.  He is not able to make a composite fist today.  Also, he reports dulled sensation to light touch along the median nerve distribution.  There is no thenar atrophy.  Sensation to light touch is intact along the proximal palm.    IMAGING: X-ray of his right wrist was obtained today.  This revealed stable fracture reduction and intact hardware.    ASSESSMENT: 3 weeks status post open reduction internal fixation of right distal radius fracture with a volar plate.  He has right hand stiffness and dull sensation to light touch along the median nerve distribution.    PLAN: I recommend hand therapy for this patient.  He will work on active range of motion and will obtain a thermoplastic splint that he will wear at all times other than when exercising.  As for the median nerve compressive symptoms, will see him back next week to reevaluate.  Until then I have asked the patient to perform strict elevation of his right hand.  If symptoms continue he will require carpal tunnel release.  We will keep him out of work for another week.    Total time spent with patient was 15 min of which greater than 50% was  in counseling.

## 2017-12-07 NOTE — MR AVS SNAPSHOT
After Visit Summary   12/7/2017    Justice Yee    MRN: [id number]           Patient Information     Date Of Birth          1965        Visit Information        Provider Department      12/7/2017 4:15 PM Carlitos Woodard MD [facility]        Today's Diagnoses     Other closed intra-articular fracture of distal end of right radius with routine healing, subsequent encounter    -  1       Follow-ups after your visit        Additional Services     HAND THERAPY       Hand Therapy Referral                  Follow-up notes from your care team     Return in about 1 week (around 12/14/2017).      Your next 10 appointments already scheduled     Dec 11, 2017 10:00 AM CST   SHALOM Hand with Delilah Rahman OT    [facility] ([facility])    [address] [place] [id number] [phone]            Dec 13, 2017  8:30 AM CST   SHALOM Hand with Isabelle Santo OT   [facility] ([facility])    [address] [place] [address]   [phone]            Dec 14, 2017  5:30 PM CST   (Arrive by 5:15 PM)   POST-OP HAND with Carlitos Woodard MD   [facility] ([facility])    [address] [place] [address]   [phone]            Dec 18, 2017  9:30 AM CST   SHALOM Hand with Delilah Rahman OT   [facility] ([facility])    [address] [place] [address]   [phone]            Dec 21, 2017  9:00 AM CST   SHALOM Hand with Delilah Rahman OT   [facility] ([facility])    [address] [place] [address]   [phone]            Dec 26, 2017  9:00 AM CST   SHALOM Hand with CARLIE Joy   [facility] ([facility])    [address] [place] [address]  [place] [address]   [phone]            Dec 28, 2017  9:30 AM CST   SHALOM Hand with CARLIE Joy [facility] ([facility])    [address] [place] [address]   [phone]            2018  9:00 AM CST   SHALOM Hand with YANN [facility] ([facility])    [address] [place] [address]   [phone]            2018  9:30 AM CST   SHALOM Hand with CARLIE Joy [facility] ([facility])    [address] [place] [address] [phone]              Who to contact     Please call your clinic at [phone] to:    Ask questions about your health    Make or cancel appointments    Discuss your medicines    Learn about your test results    Speak to your doctor   If you have compliments or concerns about an experience at your clinic, or if you wish to file a complaint, please contact [facility] Patient Relations at [phone] or email us at [email or web address]         Additional Information About Your Visit        [employer] Information     [employer] is an electronic gateway that provides easy, online access to your medical records. With [employer], you can request a clinic appointment, read your test results, renew a prescription or communicate with your care team.     To sign up for [employer] visit the website at [email or web address]   You will be asked to enter the access code listed below, as well as some personal information. Please follow the directions to create your username and password.     Your access code is: [email or web address]  Expires: 2018  3:06 PM     Your access code will  in 90 days. If you need help or a new code, please contact your [facility] or call [phone] for assistance.        Care EveryWhere  ID     This is your Care EveryWhere ID. This could be used by other organizations to access your [place] medical records  [id number]         Blood Pressure from Last 3 Encounters:   11/14/17 128/83   11/13/17 156/85    Weight from Last 3 Encounters:   11/14/17 212 lb   11/13/17 212 lb   11/09/17 214 lb              We Performed the Following     HAND THERAPY        Primary Care Provider Fax #    Physician No Ref-Primary [phone]       No address on file        Equal Access to Services     DANIEL HANSEN : Hadii aad ku hadasho Soomaali, waaxda luqadaha, qaybta kaalmada adeegyada, waxay idiin hayaan nereida waters . So [facility] [phone].    ATENCIÓN: Si habla español, tiene a otero disposición servicios gratuitos de asistencia lingüística. [facility] [phone].    We comply with applicable federal civil rights laws and [place] laws. We do not discriminate on the basis of race, color, national origin, age, disability, sex, sexual orientation, or gender identity.            Thank you!     Thank you for choosing [facility]  for your care. Our goal is always to provide you with excellent care. Hearing back from our patients is one way we can continue to improve our services. Please take a few minutes to complete the written survey that you may receive in the mail after your visit with us. Thank you!             Your Updated Medication List - Protect others around you: Learn how to safely use, store and throw away your medicines at www.disposemymeds.org.          This list is accurate as of: 12/7/17  8:15 PM.  Always use your most recent med list.                   Brand Name Dispense Instructions for use Diagnosis    TYLENOL PO      Take 320 mg by mouth

## 2017-12-08 PROBLEM — S52.571D OTHER INTRAARTICULAR FRACTURE OF LOWER END OF RIGHT RADIUS, SUBSEQUENT ENCOUNTER FOR CLOSED FRACTURE WITH ROUTINE HEALING: Status: ACTIVE | Noted: 2017-12-08

## 2017-12-08 PROBLEM — M25.631 WRIST STIFFNESS, RIGHT: Status: ACTIVE | Noted: 2017-12-08

## 2017-12-08 PROBLEM — M25.641 STIFFNESS OF FINGER JOINT, RIGHT: Status: ACTIVE | Noted: 2017-12-08

## 2017-12-09 NOTE — PROGRESS NOTES
Hand Therapy Initial Evaluation    Current Date:  12/8/2017     Diagnosis: closed intra-articular fracture of distal end of right radius with routine healing   DOI: 11/7/17  DOS: 11/14/17  Procedure: Open Reduction Internal Fixation of Right Distal Radius Fracture  Post:  3w 2d  Referring MD: Carlitos Woodard MD        Subjective:  Justice Yee is a 52 year old R hand dominant male.    Patient reports symptoms of pain and stiffness/loss of motion of the right hand and wrist which occurred due to fall off lader. Since onset symptoms are Gradually getting better.  Special tests:  x-ray.  Previous treatment: surgery.    Pertinent medical history includes:none  Medical allergies: Pt stated he had a reaction from something given during surgery.  Surgical history: none.  Medication history: pain.    Occupational Profile Information:  Current occupation is    Currently not working due to present treatment problem  Job Tasks: operating a machine/assembly, prolonged standing, lifting/carrying, pushing/pulling, repetitive tasks  Prior functional level:  no limitations  Barriers include:none  Mobility: No difficulty  Transportation: unable to use normal mode of transportation, due to current injury    Objective:  Pain Level Report  VAS(0-10) 12/8/2017   At Rest: 4/10   With Use: 5/10     Report of Pain:  Location:  wrist and hand  Pain Quality:  Sharp  Frequency: constant    Pain is worst:  daytime  Exacerbated by:  Movement   Relieved by:  rest    Edema  Circumference:  (Measured in cm)  DWC  12/8/2017   Right 20   Left 17.5     ROM:    Pt unable to make composite fist     Pain Report:  - none    + mild    ++ moderate    +++ severe   Wrist  12/8/2017    AROM (PROM) R    Extension 15    Flexion 20    RD 0    UD 0    Supination 45    Pronation 90      Strength:  Contraindicated     Sensation: Decreased median nerve distribution per MD report     Assessment:  Patient presents with symptoms consistent with  diagnosis of distal radius fracture, with surgical  intervention.     Patient's limitations or Problem List includes:  Pain, Decreased ROM/motion, Weakness, Decreased  and pinch strength of the right wrist and hand which interferes with the patient's ability to perform Self Care Tasks (dressing), Work Tasks, Household Chores and Driving  as compared to previous level of function.    Rehab Potential:  Excellent - Return to full activity, no limitations  Patient will benefit from skilled Occupational Therapy to increase wrist and forearm ROM, flexibility,  strength and pinch strength and decrease pain to return to previous activity level and resume normal daily tasks and to reach their rehab potential.    Barriers to Learning:  No barrier    Communication Issues:  Patient appears to be able to clearly communicate and understand verbal and written communication and follow directions correctly.    Chart Review: Chart Review and Simple history review with patient    Identified Performance Deficits: dressing, home establishment and management, meal preparation and cleanup, shopping, work and leisure activities    Assessment of Occupational Performance:  1-3 Performance Deficits    Clinical Decision Making (Complexity): Low complexity    Treatment Explanation:  The following has been discussed with the patient:    RX ordered/plan of care  Anticipated outcomes  Possible risks and side effects    Plan:  Frequency:  2 X week, once daily   Duration:  for 12 weeks    Treatment Plan:    Modalities:    Fluidotherapy and Paraffin   Therapeutic Exercise:   AROM of finger, thumb, wrist, forearm, and elbow  PROM of wrist E/F and P/S   Overhead fisting to control edema  Progress to  and Pinch strengthening  Progress to Wrist Isotonic strengthening  Manual Techniques:   Scar mobilization  Joint mobilization techniques   Manual Edema Mobilization  K tape  Orthotic Fabrication:    Forearm based wrist cock up orthosis/zipper  orthosis      Discharge Plan:    Achieve all LTG.  Independent in home treatment program.  Reach maximal therapeutic benefit.    Home Program:   AROM of wrist and forearm all planes  Composite flexion with foam block   Forearm based wrist orthosis full time, remove for exercise and hygiene  Avoid activities that exacerbate pain     Next Visit:  Scar massage  AROM  Tendon glides   Check orthosis   Measure finger ROM

## 2017-12-11 ENCOUNTER — THERAPY VISIT (OUTPATIENT)
Dept: OCCUPATIONAL THERAPY | Facility: CLINIC | Age: 52
End: 2017-12-11
Payer: OTHER MISCELLANEOUS

## 2017-12-11 DIAGNOSIS — S52.571D OTHER INTRAARTICULAR FRACTURE OF LOWER END OF RIGHT RADIUS, SUBSEQUENT ENCOUNTER FOR CLOSED FRACTURE WITH ROUTINE HEALING: ICD-10-CM

## 2017-12-11 DIAGNOSIS — M25.641 STIFFNESS OF FINGER JOINT, RIGHT: Primary | ICD-10-CM

## 2017-12-11 DIAGNOSIS — M25.631 WRIST STIFFNESS, RIGHT: ICD-10-CM

## 2017-12-11 PROCEDURE — 97110 THERAPEUTIC EXERCISES: CPT | Mod: GO | Performed by: OCCUPATIONAL THERAPIST

## 2017-12-11 NOTE — MR AVS SNAPSHOT
After Visit Summary   12/11/2017    Justice Yee    MRN: [id number]           Patient Information     Date Of Birth          1965        Visit Information        Provider Department      12/11/2017 10:00 AM Delilah Rahman OT [facility]        Today's Diagnoses     Stiffness of finger joint, right    -  1    Other intraarticular fracture of lower end of right radius, subsequent encounter for closed fracture with routine healing        Wrist stiffness, right           Follow-ups after your visit        Your next 10 appointments already scheduled     Dec 13, 2017  8:30 AM CST   SHALOM Hand with YANN [facility] ([facility])    [address] [place] [address]   [phone]            Dec 14, 2017  5:30 PM CST   (Arrive by 5:15 PM)   POST-OP HAND with Carlitos Woodard MD   [facility] ([facility])    [address] [place] [address]   [phone]            Dec 18, 2017  9:30 AM CST   SHALOM Hand with YANN [facility] ([facility])    [address] [place] [address]   [phone]            Dec 21, 2017  9:00 AM CST   SHALOM Hand with YANN [facility] ([facility])    [address] [place] [address]   [phone]            Dec 26, 2017  9:00 AM CST   SHALOM Hand with CARLIE Joy    [facility] ([facility])    [address] [place] [address]   [phone]            Dec 28, 2017  9:30 AM CST   SHALOM Hand with CARLIE Joy   [facility] ([facility])    [address] [place] [address]   [phone]            Jan 02, 2018  9:00 AM CST   SHALOM Hand with Margot  "MELE Gonzalez OT    [facility] ([facility])    [address] [address] [phone]            2018  9:30 AM CST   SHALOM Hand with CARLIE Joy   [facility] ([facility])    [address] [place] [address] [phone]              Who to contact     If you have questions or need follow up information about today's clinic visit or your schedule please contact [facility] directly at [phone].  Normal or non-critical lab and imaging results will be communicated to you by yavaluhart, letter or phone within 4 business days after the clinic has received the results. If you do not hear from us within 7 days, please contact the clinic through yavaluhart or phone. If you have a critical or abnormal lab result, we will notify you by phone as soon as possible.  Submit refill requests through [employer] or call your pharmacy and they will forward the refill request to us. Please allow 3 business days for your refill to be completed.          Additional Information About Your Visit        yavaluharTitanX [employer]ing Information     [employer] lets you send messages to your doctor, view your test results, renew your prescriptions, schedule appointments and more. To sign up, go to [email or web address] . Click on \"Log in\" on the left side of the screen, which will take you to the Welcome page. Then click on \"Sign up Now\" on the right side of the page.     You will be asked to enter the access code listed below, as well as some personal information. Please follow the directions to create your username and password.     Your access code is: [email or web address]  Expires: 2018  3:06 PM     Your access code will  in 90 days. If you need help or a new code, please call your [place] clinic or [phone].        Care EveryWhere ID     This is your Care EveryWhere ID. This could be used by other " organizations to access your [place] medical records  [id number]         Blood Pressure from Last 3 Encounters:   11/14/17 128/83   11/13/17 156/85    Weight from Last 3 Encounters:   11/14/17 96.2 kg (212 lb)   11/13/17 96.2 kg (212 lb)   11/09/17 97.1 kg (214 lb)              We Performed the Following     THERAPEUTIC EXERCISES        Primary Care Provider Fax #    Physician No Ref-Primary [phone]       No address on file        Equal Access to Services     DANIEL HANSEN : Hadii aad ku hadasho Soomaali, waaxda luqadaha, qaybta kaalmada adeegyada, waxprecious mosquedain haywilmern nereida waters . So [facility] [phone].    ATENCIÓN: Si habla español, tiene a otero disposición servicios gratuitos de asistencia lingüística. Llame al [phone].    We comply with applicable federal civil rights laws and [place] laws. We do not discriminate on the basis of race, color, national origin, age, disability, sex, sexual orientation, or gender identity.            Thank you!     Thank you for choosing [facility]  for your care. Our goal is always to provide you with excellent care. Hearing back from our patients is one way we can continue to improve our services. Please take a few minutes to complete the written survey that you may receive in the mail after your visit with us. Thank you!             Your Updated Medication List - Protect others around you: Learn how to safely use, store and throw away your medicines at www.disposemymeds.org.          This list is accurate as of: 12/11/17 12:49 PM.  Always use your most recent med list.                   Brand Name Dispense Instructions for use Diagnosis    TYLENOL PO      Take 320 mg by mouth

## 2017-12-11 NOTE — PROGRESS NOTES
SOAP note objective information for 12/11/2017.     Diagnosis: closed intra-articular fracture of distal end of right radius with routine healing   DOI: 11/7/17  DOS: 11/14/17  Procedure: Open Reduction Internal Fixation of Right Distal Radius Fracture  Post:  3w 2d  Referring MD: Carlitos Woodard MD   Objective:    PAIN 12/8/2017    Location   wrist and hand    Description Sharp    Radiates     Worse d/n daytime    Frequency constant     Exacerbated by Movement     Relieves  rest    At rest 0-10/10 4/10    On use 0-10/10 5/10    At worst.0-10/10     Sleep disruption?     Progression         Edema  Circumference:  (Measured in cm)  [facility]  12/8/2017   Right 20   Left 17.5     ROM:    Pt unable to make composite fist     Pain Report:  - none    + mild    ++ moderate    +++ severe   Wrist  12/8/2017 12/11   AROM (PROM) R    Extension 15    Flexion 20    RD 0    UD 0    Supination 45    Pronation 90      Strength:  Contraindicated     Sensation: Decreased median nerve distribution per MD report     Assessment:  See flowsheet  Please refer to the daily flowsheet for treatment today, total treatment time and time spent performing 1:1 timed codes.      Plan:  Frequency:  2 X week, once daily   Duration:  for 12 weeks    Treatment Plan:    Modalities:    Fluidotherapy and Paraffin   Therapeutic Exercise:   AROM of finger, thumb, wrist, forearm, and elbow  PROM of wrist E/F and P/S   Overhead fisting to control edema  Progress to  and Pinch strengthening  Progress to Wrist Isotonic strengthening  Manual Techniques:   Scar mobilization  Joint mobilization techniques   Manual Edema Mobilization  K tape  Orthotic Fabrication:    Forearm based wrist cock up orthosis/zipper orthosis      Discharge Plan:    Achieve all LTG.  Independent in home treatment program.  Reach maximal therapeutic benefit.    Home Program:   AROM of wrist and forearm all planes  Composite flexion with foam block   Forearm based wrist orthosis full time,  remove for exercise and hygiene  Avoid activities that exacerbate pain     Next Visit:  Scar massage  AROM  Tendon glides   Check orthosis   Measure finger ROM

## 2017-12-13 ENCOUNTER — THERAPY VISIT (OUTPATIENT)
Dept: OCCUPATIONAL THERAPY | Facility: CLINIC | Age: 52
End: 2017-12-13
Payer: OTHER MISCELLANEOUS

## 2017-12-13 DIAGNOSIS — S52.571D OTHER INTRAARTICULAR FRACTURE OF LOWER END OF RIGHT RADIUS, SUBSEQUENT ENCOUNTER FOR CLOSED FRACTURE WITH ROUTINE HEALING: ICD-10-CM

## 2017-12-13 DIAGNOSIS — M25.631 WRIST STIFFNESS, RIGHT: ICD-10-CM

## 2017-12-13 DIAGNOSIS — M25.641 STIFFNESS OF FINGER JOINT, RIGHT: ICD-10-CM

## 2017-12-13 PROCEDURE — 97110 THERAPEUTIC EXERCISES: CPT | Mod: GO | Performed by: OCCUPATIONAL THERAPIST

## 2017-12-13 NOTE — PROGRESS NOTES
SOAP Note Objective Information for 12/13/2017:    Pain Report:  - none    + mild    ++ moderate    +++ severe   Wrist  12/8/17 12/13/17 12/13/17   AROM (PROM) R L R   Extension 15 65 35   Flexion 20 62 32   RD 0 18 18   UD 0 32 15   Supination 45 85 75   Pronation 90 90 90     ROM: Fingers  Extension/Flexion, AROM(PROM)  Date: 2012 12/13   Side: Right   AROM(PROM)     Index:  MP               PIP               DIP 75  95  50    Long:   MP               PIP               DIP 71  97  61    Ring:    MP                PIP                DIP 70  97  55    Small:   MP                PIP                DIP 75  95  58   Thumb:  MP                IP                RABD                PABD 25/61  +/30  45  55     Home Program:   AROM of wrist and forearm all planes  Composite flexion with foam block   Tendon gliding fist series  Forearm based wrist orthosis full time, remove for exercise and hygiene  Avoid activities that exacerbate pain   Thumb AROM - composite flexion, IPJ blocking, opposition  Added Velcro strapping to orthosis to assist with self removal    Next Visit:  Initiate scar massage  AROM - wrist, fingers, thumb  Watch for FPL adhesions/stiffness  Tendon glides   Check orthosis     Please refer to the daily flowsheet for treatment today, total treatment time and time spent performing 1:1 timed codes.

## 2017-12-13 NOTE — MR AVS SNAPSHOT
After Visit Summary   12/13/2017    Justice Yee    MRN: [id number]           Patient Information     Date Of Birth          1965        Visit Information        Provider Department      12/13/2017 8:30 AM Isabelle Santo OT [facility]        Today's Diagnoses     Other intraarticular fracture of lower end of right radius, subsequent encounter for closed fracture with routine healing        Wrist stiffness, right        Stiffness of finger joint, right           Follow-ups after your visit        Your next 10 appointments already scheduled     Dec 14, 2017  5:30 PM CST   (Arrive by 5:15 PM)   POST-OP HAND with Carlitos Woodard MD   [facility] ([facility] [place])    [address] [place] [id number] [phone]            Dec 18, 2017  9:30 AM CST   SHALOM Hand with Delilah Rahman OT   [facility] ([facility] [place])    [address] [place] [address] [phone]            Dec 21, 2017  9:00 AM CST   SHALOM Hand with YANN Mack [facility] ([facility] [place])    [address] [place] [address]   [phone]            Dec 26, 2017  9:00 AM CST   SHALOM Hand with CARLIE Joy   [facility] ([facility] [place])    [address] [place] [address] [phone]            Dec 28, 2017  9:30 AM CST   SHALOM Hand with CARLIE Joy   [facility] ([facility] [place])    [address] [place] [address] [phone]            Jan 02, 2018  9:00 AM CST   SHALOM Hand with YANN Mcgee [facility] ([facility] [place])    [address] [place] [address] [phone]            Jan 04, 2018  9:30 AM CST   SHALOM Hand with Malou  "CARLIE Lewis   [facility] ([facility])    [address]  [address] [facility] [address] [phone]              Who to contact     If you have questions or need follow up information about today's clinic visit or your schedule please contact [facility] directly at [phone].  Normal or non-critical lab and imaging results will be communicated to you by MyChart, letter or phone within 4 business days after the clinic has received the results. If you do not hear from us within 7 days, please contact the clinic through MyChart or phone. If you have a critical or abnormal lab result, we will notify you by phone as soon as possible.  Submit refill requests through [employer] or call your pharmacy and they will forward the refill request to us. Please allow 3 business days for your refill to be completed.          Additional Information About Your Visit        [employer] Jock[employer]SchoolFeed Information     [employer] lets you send messages to your doctor, view your test results, renew your prescriptions, schedule appointments and more. To sign up, go to [email or web address] . Click on \"Log in\" on the left side of the screen, which will take you to the Welcome page. Then click on \"Sign up Now\" on the right side of the page.     You will be asked to enter the access code listed below, as well as some personal information. Please follow the directions to create your username and password.     Your access code is: [email or web address]  Expires: 2018  3:06 PM     Your access code will  in 90 days. If you need help or a new code, please call your [place] clinic or [phone].        Care EveryWhere ID     This is your Care EveryWhere ID. This could be used by other organizations to access your [place] medical records  [id number]         Blood Pressure from Last 3 Encounters:   17 128/83   17 156/85    Weight from Last 3 Encounters:   17 96.2 kg (212 lb) "   11/13/17 96.2 kg (212 lb)   11/09/17 97.1 kg (214 lb)              We Performed the Following     THERAPEUTIC EXERCISES        Primary Care Provider Fax #    Physician No Ref-Primary [phone]       No address on file        Equal Access to Services     DANIEL JADE : Zaid orlando bah laura Sorubén, wasnowda luqadaha, qawaltta kaalmada virgilio, sandra jaylinin hayaamaia paezmaster catalan evelin vela. So [facility] [phone].    ATENCIÓN: Si habla español, tiene a otero disposición servicios gratuitos de asistencia lingüística. Llame al [phone].    We comply with applicable federal civil rights laws and [place] laws. We do not discriminate on the basis of race, color, national origin, age, disability, sex, sexual orientation, or gender identity.            Thank you!     Thank you for choosing [facility]  for your care. Our goal is always to provide you with excellent care. Hearing back from our patients is one way we can continue to improve our services. Please take a few minutes to complete the written survey that you may receive in the mail after your visit with us. Thank you!             Your Updated Medication List - Protect others around you: Learn how to safely use, store and throw away your medicines at www.disposemymeds.org.          This list is accurate as of: 12/13/17  9:06 AM.  Always use your most recent med list.                   Brand Name Dispense Instructions for use Diagnosis    TYLENOL PO      Take 320 mg by mouth

## 2017-12-18 ENCOUNTER — THERAPY VISIT (OUTPATIENT)
Dept: OCCUPATIONAL THERAPY | Facility: CLINIC | Age: 52
End: 2017-12-18
Payer: OTHER MISCELLANEOUS

## 2017-12-18 DIAGNOSIS — M25.631 WRIST STIFFNESS, RIGHT: ICD-10-CM

## 2017-12-18 DIAGNOSIS — M25.641 STIFFNESS OF FINGER JOINT, RIGHT: Primary | ICD-10-CM

## 2017-12-18 DIAGNOSIS — S52.571D OTHER INTRAARTICULAR FRACTURE OF LOWER END OF RIGHT RADIUS, SUBSEQUENT ENCOUNTER FOR CLOSED FRACTURE WITH ROUTINE HEALING: ICD-10-CM

## 2017-12-18 PROCEDURE — 97110 THERAPEUTIC EXERCISES: CPT | Mod: GO | Performed by: OCCUPATIONAL THERAPIST

## 2017-12-18 PROCEDURE — 97530 THERAPEUTIC ACTIVITIES: CPT | Mod: GO | Performed by: OCCUPATIONAL THERAPIST

## 2017-12-18 PROCEDURE — 97140 MANUAL THERAPY 1/> REGIONS: CPT | Mod: GO | Performed by: OCCUPATIONAL THERAPIST

## 2017-12-18 NOTE — MR AVS SNAPSHOT
After Visit Summary   12/18/2017    Justice Yee    MRN: [id number]           Patient Information     Date Of Birth          1965        Visit Information        Provider Department      12/18/2017 9:30 AM Delilah Rahman OT [facility]        Today's Diagnoses     Stiffness of finger joint, right    -  1    Other intraarticular fracture of lower end of right radius, subsequent encounter for closed fracture with routine healing        Wrist stiffness, right           Follow-ups after your visit        Your next 10 appointments already scheduled     Dec 21, 2017  9:00 AM CST   SHALOM Hand with YANN [facility] ([facility] [place])    [address] [place] [address] [phone]            Dec 26, 2017  9:00 AM CST   SHALOM Hand with CARLIE Joy [facility] ([facility] [place])    [address] [place] [address] [phone]            Dec 28, 2017  9:30 AM CST   SHALOM Hand with CARLIE Joy [facility] ([facility] [place])    [address] [place] [address] [phone]            Jan 02, 2018  9:00 AM CST   SHALOM Hand with YANN [facility] ([facility] [place])    [address] [place] [address] [phone]            Jan 04, 2018  9:30 AM CST   SHALOM Hand with CARLIE Joy [facility] ([facility] [place])    [address] [place] [address] [phone]            Jan 11, 2018  4:00 PM CST   (Arrive by 3:45 PM)   POST-OP HAND with Carlitos Woodard MD   [facility] ([facility])    [address] [place] [id number] [phone]              Who to contact     If you have questions or  "need follow up information about today's clinic visit or your schedule please contact  [employer] [facility] directly at [phone].  Normal or non-critical lab and imaging results will be communicated to you by Victivhart, letter or phone within 4 business days after the clinic has received the results. If you do not hear from us within 7 days, please contact the clinic through Victivhart or phone. If you have a critical or abnormal lab result, we will notify you by phone as soon as possible.  Submit refill requests through JAZ[employer] or call your pharmacy and they will forward the refill request to us. Please allow 3 business days for your refill to be completed.          Additional Information About Your Visit        VictivharCarFin Information     [employer]ZIO lets you send messages to your doctor, view your test results, renew your prescriptions, schedule appointments and more. To sign up, go to [email or web address] . Click on \"Log in\" on the left side of the screen, which will take you to the Welcome page. Then click on \"Sign up Now\" on the right side of the page.     You will be asked to enter the access code listed below, as well as some personal information. Please follow the directions to create your username and password.     Your access code is: [email or web address]  Expires: 2018  3:06 PM     Your access code will  in 90 days. If you need help or a new code, please call your [place] clinic or [phone].        Care EveryWhere ID     This is your Care EveryWhere ID. This could be used by other organizations to access your [place] medical records  [id number]         Blood Pressure from Last 3 Encounters:   17 128/83   17 156/85    Weight from Last 3 Encounters:   17 96.2 kg (212 lb)   17 96.2 kg (212 lb)   17 97.1 kg (214 lb)              We Performed the Following     MANUAL THER TECH,1+REGIONS,EA 15 MIN     THERAPEUTIC ACTIVITIES     THERAPEUTIC EXERCISES        Primary Care " Provider Fax #    Physician No Ref-Primary [phone]       No address on file        Equal Access to Services     DANIEL HANSEN : Hadii aad ku hadlindseyolya Debrarubén, brianda lamarjoanha, pola paige jeanninerios, sandra jaylinin hayaamaia paezmaster catalan evelin vela. So [facility] [phone].    ATENCIÓN: Si habla español, tiene a otero disposición servicios gratuitos de asistencia lingüística. Llame al [phone].    We comply with applicable federal civil rights laws and [place] laws. We do not discriminate on the basis of race, color, national origin, age, disability, sex, sexual orientation, or gender identity.            Thank you!     Thank you for choosing [facility]  for your care. Our goal is always to provide you with excellent care. Hearing back from our patients is one way we can continue to improve our services. Please take a few minutes to complete the written survey that you may receive in the mail after your visit with us. Thank you!             Your Updated Medication List - Protect others around you: Learn how to safely use, store and throw away your medicines at www.disposemymeds.org.          This list is accurate as of: 12/18/17 10:33 AM.  Always use your most recent med list.                   Brand Name Dispense Instructions for use Diagnosis    TYLENOL PO      Take 320 mg by mouth

## 2017-12-18 NOTE — PROGRESS NOTES
SOAP Note Objective Information for 12/18/17    Pain Report:  - none    + mild    ++ moderate    +++ severe   Wrist  12/8/17 12/13/17 12/13/17 12/18/17   AROM (PROM) R L R R   Extension 15 65 35 35  P: 45   Flexion 20 62 32 25  P: 30   RD 0 18 18    UD 0 32 15    Supination 45 85 75 65   Pronation 90 90 90      ROM: Fingers  Extension/Flexion, AROM(PROM)  Date:  12/13   Side: Right   AROM(PROM)     Index:  MP               PIP               DIP 75  95  50    Long:   MP               PIP               DIP 71  97  61    Ring:    MP                PIP                DIP 70  97  55    Small:   MP                PIP                DIP 75  95  58   Thumb:  MP                IP                RABD                PABD 25/61  +/30  45  55     Home Program:   AROM of wrist and forearm all planes  Composite flexion with foam block   Tendon gliding fist series  Forearm based wrist orthosis full time, remove for exercise and hygiene  Avoid activities that exacerbate pain   Thumb AROM - composite flexion, IPJ blocking, opposition  Added Velcro strapping to orthosis to assist with self removal  12/18/2017  Flex strap for IF-MF-RF daily 10-30 min x4 per day  EDC glides with different wrist positions        Next Visit:  Cont scar massage  AROM - wrist, fingers, thumb  Watch for FPL adhesions/stiffness  Tendon glides   Check orthosis     Please refer to the daily flowsheet for treatment today, total treatment time and time spent performing 1:1 timed codes.

## 2017-12-20 ENCOUNTER — TELEPHONE (OUTPATIENT)
Dept: OCCUPATIONAL THERAPY | Facility: CLINIC | Age: 52
End: 2017-12-20

## 2017-12-20 DIAGNOSIS — M25.641 STIFFNESS OF FINGER JOINT, RIGHT: ICD-10-CM

## 2017-12-20 DIAGNOSIS — M25.631 WRIST STIFFNESS, RIGHT: ICD-10-CM

## 2017-12-20 DIAGNOSIS — S52.571D OTHER INTRAARTICULAR FRACTURE OF LOWER END OF RIGHT RADIUS, SUBSEQUENT ENCOUNTER FOR CLOSED FRACTURE WITH ROUTINE HEALING: ICD-10-CM

## 2017-12-20 NOTE — TELEPHONE ENCOUNTER
Call placed to [employer], Adj: Iyv Yen [phone] / CL# [id number] / DOI: 11-7-2017  Requesting 12 more visits  Delilah BARRIOS, OTR/L, CHT

## 2017-12-21 ENCOUNTER — THERAPY VISIT (OUTPATIENT)
Dept: OCCUPATIONAL THERAPY | Facility: CLINIC | Age: 52
End: 2017-12-21
Payer: OTHER MISCELLANEOUS

## 2017-12-21 DIAGNOSIS — M25.641 STIFFNESS OF FINGER JOINT, RIGHT: Primary | ICD-10-CM

## 2017-12-21 DIAGNOSIS — M25.631 WRIST STIFFNESS, RIGHT: ICD-10-CM

## 2017-12-21 DIAGNOSIS — S52.571D OTHER INTRAARTICULAR FRACTURE OF LOWER END OF RIGHT RADIUS, SUBSEQUENT ENCOUNTER FOR CLOSED FRACTURE WITH ROUTINE HEALING: ICD-10-CM

## 2017-12-21 PROCEDURE — 97022 WHIRLPOOL THERAPY: CPT | Mod: GO | Performed by: OCCUPATIONAL THERAPIST

## 2017-12-21 PROCEDURE — 97110 THERAPEUTIC EXERCISES: CPT | Mod: GO | Performed by: OCCUPATIONAL THERAPIST

## 2017-12-21 PROCEDURE — 97140 MANUAL THERAPY 1/> REGIONS: CPT | Mod: GO | Performed by: OCCUPATIONAL THERAPIST

## 2017-12-21 NOTE — MR AVS SNAPSHOT
After Visit Summary   12/21/2017    Justice Yee    MRN: [id number]           Patient Information     Date Of Birth          1965        Visit Information        Provider Department      12/21/2017 9:00 AM Delilah [facility]man OT [facility]        Today's Diagnoses     Stiffness of finger joint, right    -  1    Other intraarticular fracture of lower end of right radius, subsequent encounter for closed fracture with routine healing        Wrist stiffness, right           Follow-ups after your visit        Your next 10 appointments already scheduled     Dec 26, 2017 10:00 AM CST   SHALOM Hand with CARLIE [facility] ([facility] [place])    [address] [place] [address]   [phone]            Dec 28, 2017 10:30 AM CST   SHALOM Hand with CARLIE Joy [facility] ([facility] [place])    [address] [place] [address]   [phone]            Jan 02, 2018 12:30 PM CST   SHALOM Hand with YANN [facility] ([facility] [place])    [address] [place] [address]   [phone]            Jan 04, 2018  8:00 AM CST   SHALOM Hand with CARLIE Joy [facility] ([facility] [place])    [address] [place] [address]   [phone]            Jan 08, 2018  5:00 PM CST   SHALOM Hand with YANN Mack [facility] ([facility] [place])    [address] [place] [address]   [phone]            Jan 11, 2018  4:00 PM CST   (Arrive by 3:45 PM)   POST-OP HAND with Carlitos Woodard MD   [facility] ([facility])    [address] [place] [address]   [phone]            Jan 11, 2018  5:30 PM CST   SHALOM Hand with  "Malou Lewis, OTR   [facility] ([facility])    [address] [address] [phone]            2018  3:00 PM CST   SHALOM Hand with Delilah Rahman, OT   [facility] ([facility])    [address] [address] [phone]              Who to contact     If you have questions or need follow up information about today's clinic visit or your schedule please contact [facility] directly at [phone].  Normal or non-critical lab and imaging results will be communicated to you by Algramohart, letter or phone within 4 business days after the clinic has received the results. If you do not hear from us within 7 days, please contact the clinic through Algramohart or phone. If you have a critical or abnormal lab result, we will notify you by phone as soon as possible.  Submit refill requests through [employer] or call your pharmacy and they will forward the refill request to us. Please allow 3 business days for your refill to be completed.          Additional Information About Your Visit        [employer] Information     [employer] lets you send messages to your doctor, view your test results, renew your prescriptions, schedule appointments and more. To sign up, go to [email or web address] . Click on \"Log in\" on the left side of the screen, which will take you to the Welcome page. Then click on \"Sign up Now\" on the right side of the page.     You will be asked to enter the access code listed below, as well as some personal information. Please follow the directions to create your username and password.     Your access code is: [email or web address]  Expires: 2018  3:06 PM     Your access code will  in 90 days. If you need help or a new code, please call your [place] clinic or [phone].        Care EveryWhere ID     This is your Care EveryWhere ID. This could be used by other " organizations to access your [place] medical records  [id number]         Blood Pressure from Last 3 Encounters:   11/14/17 128/83   11/13/17 156/85    Weight from Last 3 Encounters:   11/14/17 96.2 kg (212 lb)   11/13/17 96.2 kg (212 lb)   11/09/17 97.1 kg (214 lb)              We Performed the Following     MANUAL THER TECH,1+REGIONS,EA 15 MIN     THERAPEUTIC EXERCISES     WHIRLPOOL THERAPY        Primary Care Provider Fax #    Physician No Ref-Primary [phone]       No address on file        Equal Access to Services     [facility]: Hadii orlando bah hadasho Soomaali, waaxda luqadaha, qaybta kaalmada aderios, sandra waters . So [facility] [phone].    ATENCIÓN: Si habla español, tiene a otero disposición servicios gratuitos de asistencia lingüística. [facility] [phone].    We comply with applicable federal civil rights laws and [place] laws. We do not discriminate on the basis of race, color, national origin, age, disability, sex, sexual orientation, or gender identity.            Thank you!     Thank you for choosing [facility]  for your care. Our goal is always to provide you with excellent care. Hearing back from our patients is one way we can continue to improve our services. Please take a few minutes to complete the written survey that you may receive in the mail after your visit with us. Thank you!             Your Updated Medication List - Protect others around you: Learn how to safely use, store and throw away your medicines at www.disposemymeds.org.          This list is accurate as of: 12/21/17  3:30 PM.  Always use your most recent med list.                   Brand Name Dispense Instructions for use Diagnosis    TYLENOL PO      Take 320 mg by mouth

## 2017-12-21 NOTE — PROGRESS NOTES
SOAP note objective information for 12/21/2017.  Please refer to the daily flowsheet for treatment today, total treatment time and time spent performing 1:1 timed codes.      ROM:  Pain Report:  - none    + mild    ++ moderate    +++ severe   Wrist  12/8/17 12/13/17 12/13/17 12/18/17   AROM (PROM) R L R R   Extension 15 65 35 35  P: 45   Flexion 20 62 32 25  P: 30   RD 0 18 18    UD 0 32 15    Supination 45 85 75 65   Pronation 90 90 90       Fingers  Extension/Flexion, AROM(PROM)  Date:   12/13    Side:  Right    AROM(PROM)       Index:  MP               PIP               DIP  75  95  50     Long:   MP               PIP               DIP  71  97  61     Ring:    MP                PIP                DIP  70  97  55     Small:   MP                PIP                DIP  75  95  58    Thumb:  MP                IP                RABD                PABD  25/61  +/30  45  55          Edema  Circumference: (Measured in cm)  12/21/2017        Location: Index Long Ring Small Thumb    Right Left Right Left Right Left Right Left Right Left   P1 7.5 7.0 7.6 6.9 7.8 6.7 6.5 5.9 7.6 7.0   PIP             P2             DIP                 Circumference:  (Measured in cm)  [facility]  12/8/2017   Right 20   Left 17.5     ROM:    Pt unable to make composite fist     Pain Report:  - none    + mild    ++ moderate    +++ severe   Wrist  12/8/2017 12/11   AROM (PROM) R    Extension 15    Flexion 20    RD 0    UD 0    Supination 45    Pronation 90      Home Program:   AROM of wrist and forearm all planes  Composite flexion with foam block   Tendon gliding fist series  Forearm based wrist orthosis full time, remove for exercise and hygiene  Avoid activities that exacerbate pain   Thumb AROM - composite flexion, IPJ blocking, opposition  Added Velcro strapping to orthosis to assist with self removal  12/18/2017  Flex strap for IF-MF-RF daily 10-30 min x4 per day  EDC glides with different wrist positions  12/21/2017  Prayer stretch, flexion  PROM, continue finger flexion straps, out of ortoses at work as able, for light duty  FPL flexion with wrist ext/flex, and claw with wrist flex/ext for tendon adhesion reduction      Next Visit:  Cont scar massage  AROM - wrist, fingers, thumb  Watch for FPL adhesions/stiffness  Tendon glides   Check orthosis     Please refer to the daily flowsheet for treatment today, total treatment time and time spent performing 1:1 timed codes.

## 2017-12-26 ENCOUNTER — THERAPY VISIT (OUTPATIENT)
Dept: OCCUPATIONAL THERAPY | Facility: CLINIC | Age: 52
End: 2017-12-26
Payer: OTHER MISCELLANEOUS

## 2017-12-26 ENCOUNTER — MEDICAL CORRESPONDENCE (OUTPATIENT)
Dept: HEALTH INFORMATION MANAGEMENT | Facility: CLINIC | Age: 52
End: 2017-12-26

## 2017-12-26 DIAGNOSIS — S52.571D OTHER INTRAARTICULAR FRACTURE OF LOWER END OF RIGHT RADIUS, SUBSEQUENT ENCOUNTER FOR CLOSED FRACTURE WITH ROUTINE HEALING: ICD-10-CM

## 2017-12-26 DIAGNOSIS — M25.641 STIFFNESS OF FINGER JOINT, RIGHT: ICD-10-CM

## 2017-12-26 DIAGNOSIS — M25.631 WRIST STIFFNESS, RIGHT: Primary | ICD-10-CM

## 2017-12-26 PROCEDURE — 97140 MANUAL THERAPY 1/> REGIONS: CPT | Mod: GO | Performed by: OCCUPATIONAL THERAPIST

## 2017-12-26 PROCEDURE — 97110 THERAPEUTIC EXERCISES: CPT | Mod: GO | Performed by: OCCUPATIONAL THERAPIST

## 2017-12-26 NOTE — MR AVS SNAPSHOT
After Visit Summary   12/26/2017    Justice Yee    MRN: [id number]           Patient Information     Date Of Birth          1965        Visit Information        Provider Department      12/26/2017 10:00 AM Malou Lewis OTR [facility]        Today's Diagnoses     Wrist stiffness, right    -  1    Other intraarticular fracture of lower end of right radius, subsequent encounter for closed fracture with routine healing        Stiffness of finger joint, right           Follow-ups after your visit        Your next 10 appointments already scheduled     Dec 28, 2017 10:30 AM CST   SHALOM Hand with CARLIE [facility] ([facility] [place])    [address] [place] [address]   [phone]            Jan 02, 2018 12:30 PM CST   SHALOM Hand with YANN Mcgee [facility] ([facility] [place])    [address] [place] [address]   [phone]            Jan 04, 2018  8:00 AM CST   SHALOM Hand with CARLIE Joy [facility] ([facility] [place])    [address] [place] [address]   [phone]            Jan 08, 2018  5:00 PM CST   SHALOM Hand with YANN Mack [facility] ([facility] [place])    [address] [place] [address] [phone]            Jan 11, 2018  4:00 PM CST   (Arrive by 3:45 PM)   POST-OP HAND with Carlitos Woodard MD   [facility] ([facility] [place])    [address] [place] [address]   [phone]            Jan 11, 2018  5:30 PM CST   SHALOM Hand with CARLIE Joy   [facility] ([facility])    [address] [place] [id number] [phone]            Jan 16, 2018  3:00 PM CST   SHALOM Hand with  "Delilah Rahman, OT   [facility] ([facility] [place])    [address] [facility] [address] [phone]              Who to contact     If you have questions or need follow up information about today's clinic visit or your schedule please contact [facility] directly at [phone].  Normal or non-critical lab and imaging results will be communicated to you by MyChart, letter or phone within 4 business days after the clinic has received the results. If you do not hear from us within 7 days, please contact the clinic through MyChart or phone. If you have a critical or abnormal lab result, we will notify you by phone as soon as possible.  Submit refill requests through [employer] or call your pharmacy and they will forward the refill request to us. Please allow 3 business days for your refill to be completed.          Additional Information About Your Visit        [employer] Information     [employer] lets you send messages to your doctor, view your test results, renew your prescriptions, schedule appointments and more. To sign up, go to [email or web address] . Click on \"Log in\" on the left side of the screen, which will take you to the Welcome page. Then click on \"Sign up Now\" on the right side of the page.     You will be asked to enter the access code listed below, as well as some personal information. Please follow the directions to create your username and password.     Your access code is: [email or web address]  Expires: 2018  3:06 PM     Your access code will  in 90 days. If you need help or a new code, please call your [place] clinic or [phone].        Care EveryWhere ID     This is your Care EveryWhere ID. This could be used by other organizations to access your [place] medical records  [id number]         Blood Pressure from Last 3 Encounters:   17 128/83   17 156/85    Weight from Last 3 Encounters:   17 96.2 kg (212 lb) "   11/13/17 96.2 kg (212 lb)   11/09/17 97.1 kg (214 lb)              We Performed the Following     MANUAL THER TECH,1+REGIONS,EA 15 MIN     THERAPEUTIC EXERCISES        Primary Care Provider Fax #    Physician No Ref-Primary [phone]       No address on file        Equal Access to Services     DANIEL HANSEN : Hadii aad ku hadlindseyo Soomaali, waaxda luqadaha, qaybta kaalmada adeegyada, sandra zelayanegrakip waters . So [facility] [phone].    ATENCIÓN: Si habla español, tiene a otero disposición servicios gratuitos de asistencia lingüística. Llame al [phone].    We comply with applicable federal civil rights laws and [place] laws. We do not discriminate on the basis of race, color, national origin, age, disability, sex, sexual orientation, or gender identity.            Thank you!     Thank you for choosing [facility]  for your care. Our goal is always to provide you with excellent care. Hearing back from our patients is one way we can continue to improve our services. Please take a few minutes to complete the written survey that you may receive in the mail after your visit with us. Thank you!             Your Updated Medication List - Protect others around you: Learn how to safely use, store and throw away your medicines at www.disposemymeds.org.          This list is accurate as of: 12/26/17 11:05 AM.  Always use your most recent med list.                   Brand Name Dispense Instructions for use Diagnosis    TYLENOL PO      Take 320 mg by mouth

## 2017-12-26 NOTE — PROGRESS NOTES
SOAP note objective information for 12/26/2017.  Please refer to the daily flowsheet for treatment today, total treatment time and time spent performing 1:1 timed codes.      ROM:  Pain Report:  - none    + mild    ++ moderate    +++ severe   Wrist  12/8/17 12/13/17 12/13/17 12/18/17 12/26/17   AROM (PROM) R L R R    Extension 15 65 35 35  P: 45 35   Flexion 20 62 32 25  P: 30 26   RD 0 18 18  15   UD 0 32 15  15   Supination 45 85 75 65 70   Pronation 90 90 90        Fingers  Extension/Flexion, AROM(PROM)  Date:   12/13    Side:  Right    AROM(PROM)       Index:  MP               PIP               DIP  75  95  50     Long:   MP               PIP               DIP  71  97  61     Ring:    MP                PIP                DIP  70  97  55     Small:   MP                PIP                DIP  75  95  58    Thumb:  MP                IP                RABD                PABD  25/61  +/30  45  55        Edema  Circumference: (Measured in cm)  12/21/2017        Location: Index Long Ring Small Thumb    Right Left Right Left Right Left Right Left Right Left   P1 7.5 7.0 7.6 6.9 7.8 6.7 6.5 5.9 7.6 7.0   PIP             P2             DIP                 Circumference:  (Measured in cm)  [facility]  12/8/2017   Right 20   Left 17.5       Home Program:   AROM of wrist and forearm all planes  Composite flexion with foam block   Tendon gliding fist series  Forearm based wrist orthosis full time, remove for exercise and hygiene  Avoid activities that exacerbate pain   Thumb AROM - composite flexion, IPJ blocking, opposition  Added Velcro strapping to orthosis to assist with self removal  12/18/2017  Flex strap for IF-MF-RF daily 10-30 min x4 per day  EDC glides with different wrist positions  12/21/2017  Prayer stretch, flexion PROM, continue finger flexion straps, out of ortoses at work as able, for light duty  FPL flexion with wrist ext/flex, and claw with wrist flex/ext for tendon adhesion reduction      Next Visit:  Cont scar  massage  Fluidotherapy   AROM - wrist, fingers, thumb  Watch for FPL adhesions/stiffness  Tendon glides   Check orthosis

## 2017-12-28 ENCOUNTER — THERAPY VISIT (OUTPATIENT)
Dept: OCCUPATIONAL THERAPY | Facility: CLINIC | Age: 52
End: 2017-12-28
Payer: OTHER MISCELLANEOUS

## 2017-12-28 DIAGNOSIS — S52.571D OTHER INTRAARTICULAR FRACTURE OF LOWER END OF RIGHT RADIUS, SUBSEQUENT ENCOUNTER FOR CLOSED FRACTURE WITH ROUTINE HEALING: Primary | ICD-10-CM

## 2017-12-28 DIAGNOSIS — M25.631 WRIST STIFFNESS, RIGHT: ICD-10-CM

## 2017-12-28 DIAGNOSIS — M25.641 STIFFNESS OF FINGER JOINT, RIGHT: ICD-10-CM

## 2017-12-28 PROCEDURE — 97110 THERAPEUTIC EXERCISES: CPT | Mod: GO | Performed by: OCCUPATIONAL THERAPIST

## 2017-12-28 PROCEDURE — 97140 MANUAL THERAPY 1/> REGIONS: CPT | Mod: GO | Performed by: OCCUPATIONAL THERAPIST

## 2018-01-02 ENCOUNTER — THERAPY VISIT (OUTPATIENT)
Dept: OCCUPATIONAL THERAPY | Facility: CLINIC | Age: 53
End: 2018-01-02
Payer: OTHER MISCELLANEOUS

## 2018-01-02 DIAGNOSIS — S52.571D OTHER INTRAARTICULAR FRACTURE OF LOWER END OF RIGHT RADIUS, SUBSEQUENT ENCOUNTER FOR CLOSED FRACTURE WITH ROUTINE HEALING: ICD-10-CM

## 2018-01-02 DIAGNOSIS — M25.631 WRIST STIFFNESS, RIGHT: ICD-10-CM

## 2018-01-02 DIAGNOSIS — M25.641 STIFFNESS OF FINGER JOINT, RIGHT: ICD-10-CM

## 2018-01-02 PROCEDURE — 97110 THERAPEUTIC EXERCISES: CPT | Mod: GO | Performed by: OCCUPATIONAL THERAPIST

## 2018-01-02 PROCEDURE — 97140 MANUAL THERAPY 1/> REGIONS: CPT | Mod: GO | Performed by: OCCUPATIONAL THERAPIST

## 2018-01-02 PROCEDURE — 97022 WHIRLPOOL THERAPY: CPT | Mod: GO | Performed by: OCCUPATIONAL THERAPIST

## 2018-01-02 NOTE — PROGRESS NOTES
SOAP note objective information for 1/2/2018.  ROM:  Pain Report:  - none    + mild    ++ moderate    +++ severe   Wrist  12/8/17 12/13/17 12/13/17 12/18/17 12/26/17 1/2/18   AROM (PROM) R L R R     Extension 15 65 35 35  P: 45 35 40   Flexion 20 62 32 25  P: 30 26 41   RD 0 18 18  15 12   UD 0 32 15  15 23   Supination 45 85 75 65 70 74   Pronation 90 90 90         Fingers  Extension/Flexion, AROM(PROM)  Date:   12/13 1/2/18   Side:  Right    AROM(PROM)       Index:  MP               PIP               DIP  75  95  50 -14/72  0/84  0/35  177    Long:   MP               PIP               DIP  71  97  61 0/80  0/95  0/50  225    Ring:    MP                PIP                DIP  70  97  55 0/77  0/97  0/55  229    Small:   MP                PIP                DIP  75  95  58 -7/80  -4/94  0/58  221   Thumb:  MP                IP                RABD                PABD  25/61  +/30  45  55 -19/55  +/40  51  53     Home Program:   AROM of wrist and forearm all planes  Tendon gliding fist series  Forearm based wrist orthosis-weaning  Avoid activities that exacerbate pain   Thumb AROM - composite flexion, IPJ blocking, opposition  Added Velcro strapping to orthosis to assist with self removal  12/18/2017  Flex strap for IF-MF-RF daily 10-30 min x4 per day  EDC glides with different wrist positions  12/21/2017  Prayer stretch, flexion PROM, continue finger flexion straps, out of ortoses at work as able, for light duty  FPL flexion with wrist ext/flex, and claw with wrist flex/ext for tendon adhesion reduction    Next Visit:  Cont scar massage  Fluidotherapy   AROM - wrist, fingers, thumb  Watch for FPL adhesions/stiffness  Tendon glides   Check orthosis

## 2018-01-02 NOTE — MR AVS SNAPSHOT
After Visit Summary   1/2/2018    Justice Yee    MRN: [id number]           Patient Information     Date Of Birth          1965        Visit Information        Provider Department      1/2/2018 12:30 PM Margot Gonzalez OT  [facility]        Today's Diagnoses     Wrist stiffness, right        Other intraarticular fracture of lower end of right radius, subsequent encounter for closed fracture with routine healing        Stiffness of finger joint, right           Follow-ups after your visit        Your next 10 appointments already scheduled     Jan 04, 2018  8:00 AM CST   SHALOM Hand with CARLIE Joy    [facility] ([facility] [place])    [address] [place] [address] [phone]            Jan 08, 2018  5:00 PM CST   SHALOM Hand with Delilah Rahman OT   [facility] ([facility])    [address] [place] [address] [phone]            Jan 11, 2018  5:30 PM CST   SHALOM Hand with CARLIE Joy [facility] ([facility] [place])    [address] [place] [address] [phone]            Jan 16, 2018  3:00 PM CST   SHALOM Hand with YANN Mack [facility] ([facility])    [address] [place] [address] [phone]            Jan 18, 2018  4:00 PM CST   (Arrive by 3:45 PM)   POST-OP HAND with Carlitos Woodard MD   [facility] ([facility])    [address] [place] [address] [phone]            Jan 18, 2018  4:30 PM CST   SHALOM Hand with Delilah Rahman OT   [facility] ([facility])    [address] [place] [id number] [phone]              Who to contact     If you have questions or need  "follow up information about today's clinic visit or your schedule please contact [facility] directly at [phone].  Normal or non-critical lab and imaging results will be communicated to you by MyChart, letter or phone within 4 business days after the clinic has received the results. If you do not hear from us within 7 days, please contact the clinic through BookingPalhart or phone. If you have a critical or abnormal lab result, we will notify you by phone as soon as possible.  Submit refill requests through infoBizz or call your pharmacy and they will forward the refill request to us. Please allow 3 business days for your refill to be completed.          Additional Information About Your Visit        BookingPalharLocish Information     infoBizz lets you send messages to your doctor, view your test results, renew your prescriptions, schedule appointments and more. To sign up, go to [email or web address] . Click on \"Log in\" on the left side of the screen, which will take you to the Welcome page. Then click on \"Sign up Now\" on the right side of the page.     You will be asked to enter the access code listed below, as well as some personal information. Please follow the directions to create your username and password.     Your access code is: [email or web address]  Expires: 2018  3:06 PM     Your access code will  in 90 days. If you need help or a new code, please call your [place] clinic or [phone].        Care EveryWhere ID     This is your Care EveryWhere ID. This could be used by other organizations to access your [place] medical records  [id number]         Blood Pressure from Last 3 Encounters:   17 128/83   17 156/85    Weight from Last 3 Encounters:   17 96.2 kg (212 lb)   17 96.2 kg (212 lb)   17 97.1 kg (214 lb)              We Performed the Following     MANUAL THER TECH,1+REGIONS,EA 15 MIN     THERAPEUTIC EXERCISES     WHIRLPOOL THERAPY        Primary Care Provider Fax #    " Physician No Ref-Primary [phone]       No address on file        Equal Access to Services     YUEZULEIMA JADE : Hadii aad ku hadlindseyolya Malloy, wasnowda nnamdi, andressagerard castelanboochelly poole, sandra zelayanegrakip vela. So [facility] [phone].    ATENCIÓN: Si habla español, tiene a otero disposición servicios gratuitos de asistencia lingüística. Llame al [phone].    We comply with applicable federal civil rights laws and [place] laws. We do not discriminate on the basis of race, color, national origin, age, disability, sex, sexual orientation, or gender identity.            Thank you!     Thank you for choosing [facility]  for your care. Our goal is always to provide you with excellent care. Hearing back from our patients is one way we can continue to improve our services. Please take a few minutes to complete the written survey that you may receive in the mail after your visit with us. Thank you!             Your Updated Medication List - Protect others around you: Learn how to safely use, store and throw away your medicines at www.disposemymeds.org.          This list is accurate as of: 1/2/18  1:14 PM.  Always use your most recent med list.                   Brand Name Dispense Instructions for use Diagnosis    TYLENOL PO      Take 320 mg by mouth

## 2018-01-04 ENCOUNTER — THERAPY VISIT (OUTPATIENT)
Dept: OCCUPATIONAL THERAPY | Facility: CLINIC | Age: 53
End: 2018-01-04
Payer: OTHER MISCELLANEOUS

## 2018-01-04 DIAGNOSIS — S52.571D OTHER INTRAARTICULAR FRACTURE OF LOWER END OF RIGHT RADIUS, SUBSEQUENT ENCOUNTER FOR CLOSED FRACTURE WITH ROUTINE HEALING: Primary | ICD-10-CM

## 2018-01-04 DIAGNOSIS — M25.641 STIFFNESS OF FINGER JOINT, RIGHT: ICD-10-CM

## 2018-01-04 DIAGNOSIS — M25.631 WRIST STIFFNESS, RIGHT: ICD-10-CM

## 2018-01-04 PROCEDURE — 97110 THERAPEUTIC EXERCISES: CPT | Mod: GO | Performed by: OCCUPATIONAL THERAPIST

## 2018-01-04 PROCEDURE — 97140 MANUAL THERAPY 1/> REGIONS: CPT | Mod: GO | Performed by: OCCUPATIONAL THERAPIST

## 2018-01-08 ENCOUNTER — THERAPY VISIT (OUTPATIENT)
Dept: OCCUPATIONAL THERAPY | Facility: CLINIC | Age: 53
End: 2018-01-08
Payer: OTHER MISCELLANEOUS

## 2018-01-08 DIAGNOSIS — M25.641 STIFFNESS OF FINGER JOINT, RIGHT: ICD-10-CM

## 2018-01-08 DIAGNOSIS — S52.571D OTHER INTRAARTICULAR FRACTURE OF LOWER END OF RIGHT RADIUS, SUBSEQUENT ENCOUNTER FOR CLOSED FRACTURE WITH ROUTINE HEALING: ICD-10-CM

## 2018-01-08 DIAGNOSIS — M25.631 WRIST STIFFNESS, RIGHT: Primary | ICD-10-CM

## 2018-01-08 PROCEDURE — 97035 APP MDLTY 1+ULTRASOUND EA 15: CPT | Mod: GO | Performed by: OCCUPATIONAL THERAPIST

## 2018-01-08 PROCEDURE — 97110 THERAPEUTIC EXERCISES: CPT | Mod: GO | Performed by: OCCUPATIONAL THERAPIST

## 2018-01-08 PROCEDURE — 97140 MANUAL THERAPY 1/> REGIONS: CPT | Mod: GO | Performed by: OCCUPATIONAL THERAPIST

## 2018-01-08 PROCEDURE — 97112 NEUROMUSCULAR REEDUCATION: CPT | Mod: GO | Performed by: OCCUPATIONAL THERAPIST

## 2018-01-08 NOTE — MR AVS SNAPSHOT
After Visit Summary   1/8/2018    Justice Yee    MRN: [id number]           Patient Information     Date Of Birth          1965        Visit Information        Provider Department      1/8/2018 5:00 PM Delilah Rahman OT [facility]        Today's Diagnoses     Wrist stiffness, right    -  1    Other intraarticular fracture of lower end of right radius, subsequent encounter for closed fracture with routine healing        Stiffness of finger joint, right           Follow-ups after your visit        Your next 10 appointments already scheduled     Jan 11, 2018  2:30 PM CST   SHALOM Hand with CARLIE Joy   [facility] ([facility])    [address] [place] [phone]            Jan 16, 2018  3:00 PM CST   SHALOM Hand with Delilah Rahman OT   [facility] ([facility])    [address] [place] [id number] [phone]            Jan 18, 2018  4:00 PM CST   (Arrive by 3:45 PM)   POST-OP HAND with Carlitos Woodard MD   [facility] ([facility])    [address] [place] [phone]            Jan 18, 2018  4:30 PM CST   SHALOM Hand with Delilah Rahman OT   [facility] ([facility])    [address] [place] [phone] [phone]              Who to contact     If you have questions or need follow up information about today's clinic visit or your schedule please contact [facility] directly at [phone].  Normal or non-critical lab and imaging results will be communicated to you by MyChart, letter or phone within 4 business days after the clinic has received the results. If you do not hear from us within 7 days, please contact the clinic through MyChart or phone. If you have a critical or abnormal  "lab result, we will notify you by phone as soon as possible.  Submit refill requests through [employer] or call your pharmacy and they will forward the refill request to us. Please allow 3 business days for your refill to be completed.          Additional Information About Your Visit        MyChart Information     [employer] lets you send messages to your doctor, view your test results, renew your prescriptions, schedule appointments and more. To sign up, go to [email or web address] . Click on \"Log in\" on the left side of the screen, which will take you to the Welcome page. Then click on \"Sign up Now\" on the right side of the page.     You will be asked to enter the access code listed below, as well as some personal information. Please follow the directions to create your username and password.     Your access code is: [email or web address]  Expires: 2018  3:06 PM     Your access code will  in 90 days. If you need help or a new code, please call your [place] clinic or [phone].        Care EveryWhere ID     This is your Care EveryWhere ID. This could be used by other organizations to access your [place] medical records  [id number]         Blood Pressure from Last 3 Encounters:   17 128/83   17 156/85    Weight from Last 3 Encounters:   17 96.2 kg (212 lb)   17 96.2 kg (212 lb)   17 97.1 kg (214 lb)              We Performed the Following     MANUAL THER TECH,1+REGIONS,EA 15 MIN     NEUROMUSCULAR RE-EDUCATION     THERAPEUTIC EXERCISES     ULTRASOUND THERAPY        Primary Care Provider Fax #    Physician No Ref-Primary [phone]       No address on file        Equal Access to Services     DANIEL HANSEN : Hadirqa Malloy, jerman coto, qaybgerard castelanalsandra matthews. So [facility] [phone].    ATENCIÓN: Si habla español, tiene a otero disposición servicios gratuitos de asistencia lingüística. Llame al [phone].    We comply with " applicable federal civil rights laws and [place] laws. We do not discriminate on the basis of race, color, national origin, age, disability, sex, sexual orientation, or gender identity.            Thank you!     Thank you for choosing [facility]  for your care. Our goal is always to provide you with excellent care. Hearing back from our patients is one way we can continue to improve our services. Please take a few minutes to complete the written survey that you may receive in the mail after your visit with us. Thank you!             Your Updated Medication List - Protect others around you: Learn how to safely use, store and throw away your medicines at www.disposemymeds.org.          This list is accurate as of: 1/8/18  6:10 PM.  Always use your most recent med list.                   Brand Name Dispense Instructions for use Diagnosis    TYLENOL PO      Take 320 mg by mouth

## 2018-01-08 NOTE — PROGRESS NOTES
SOAP note objective information for 1/8/2018.    ROM:  Pain Report:  - none    + mild    ++ moderate    +++ severe   Wrist  12/8/17 12/13/17 12/13/17 12/18/17 12/26/17 1/2/18 1/8   AROM (PROM) R L R R      Extension 15 65 35 35  P: 45 35 40 45   Flexion 20 62 32 25  P: 30 26 41 40   RD 0 18 18  15 12    UD 0 32 15  15 23    Supination 45 85 75 65 70 74    Pronation 90 90 90          Fingers  Extension/Flexion, AROM(PROM)  Date:   12/13 1/2/18 1/8   Side:  Right     AROM(PROM)        Index:  MP               PIP               DIP  75  95  50 -14/72  0/84  0/35  177 0/82  0892  0/40    Long:   MP               PIP               DIP  71  97  61 0/80  0/95  0/50  225     Ring:    MP                PIP                DIP  70  97  55 0/77  0/97  0/55  229     Small:   MP                PIP                DIP  75  95  58 -7/80  -4/94  0/58  221    Thumb:  MP                IP                RABD                PABD  25/61  +/30  45  55 -19/55  +/40  51  53      Home Program:   AROM of wrist and forearm all planes  Tendon gliding fist series  Forearm based wrist orthosis-weaning  Avoid activities that exacerbate pain   Thumb AROM - composite flexion, IPJ blocking, opposition  Added Velcro strapping to orthosis to assist with self removal  12/18/2017  Flex strap for IF-MF-RF daily 10-30 min x4 per day  EDC glides with different wrist positions  12/21/2017  Prayer stretch, flexion PROM, continue finger flexion straps, out of ortoses at work as able, for light duty  FPL flexion with wrist ext/flex, and claw with wrist flex/ext for tendon adhesion reduction  1/8/2018  Pen roll  Icing to ECU and avoid painful motions.     Next Visit:  Cont scar massage   AROM - wrist, fingers, thumb  Watch for FPL adhesions/stiffness  Tendon glides

## 2018-01-11 ENCOUNTER — THERAPY VISIT (OUTPATIENT)
Dept: OCCUPATIONAL THERAPY | Facility: CLINIC | Age: 53
End: 2018-01-11
Payer: OTHER MISCELLANEOUS

## 2018-01-11 DIAGNOSIS — M25.641 STIFFNESS OF FINGER JOINT, RIGHT: ICD-10-CM

## 2018-01-11 DIAGNOSIS — S52.571D OTHER INTRAARTICULAR FRACTURE OF LOWER END OF RIGHT RADIUS, SUBSEQUENT ENCOUNTER FOR CLOSED FRACTURE WITH ROUTINE HEALING: Primary | ICD-10-CM

## 2018-01-11 DIAGNOSIS — M25.631 WRIST STIFFNESS, RIGHT: ICD-10-CM

## 2018-01-11 PROCEDURE — 97110 THERAPEUTIC EXERCISES: CPT | Mod: GO | Performed by: OCCUPATIONAL THERAPIST

## 2018-01-11 PROCEDURE — 97140 MANUAL THERAPY 1/> REGIONS: CPT | Mod: GO | Performed by: OCCUPATIONAL THERAPIST

## 2018-01-16 ENCOUNTER — THERAPY VISIT (OUTPATIENT)
Dept: OCCUPATIONAL THERAPY | Facility: CLINIC | Age: 53
End: 2018-01-16
Payer: OTHER MISCELLANEOUS

## 2018-01-16 DIAGNOSIS — S52.571D OTHER INTRAARTICULAR FRACTURE OF LOWER END OF RIGHT RADIUS, SUBSEQUENT ENCOUNTER FOR CLOSED FRACTURE WITH ROUTINE HEALING: ICD-10-CM

## 2018-01-16 DIAGNOSIS — M25.631 WRIST STIFFNESS, RIGHT: Primary | ICD-10-CM

## 2018-01-16 DIAGNOSIS — M25.641 STIFFNESS OF FINGER JOINT, RIGHT: ICD-10-CM

## 2018-01-16 PROCEDURE — 97035 APP MDLTY 1+ULTRASOUND EA 15: CPT | Mod: GO | Performed by: OCCUPATIONAL THERAPIST

## 2018-01-16 PROCEDURE — 97110 THERAPEUTIC EXERCISES: CPT | Mod: GO | Performed by: OCCUPATIONAL THERAPIST

## 2018-01-16 PROCEDURE — 97535 SELF CARE MNGMENT TRAINING: CPT | Mod: GO | Performed by: OCCUPATIONAL THERAPIST

## 2018-01-16 PROCEDURE — 97760 ORTHOTIC MGMT&TRAING 1ST ENC: CPT | Mod: GO | Performed by: OCCUPATIONAL THERAPIST

## 2018-01-16 PROCEDURE — 97140 MANUAL THERAPY 1/> REGIONS: CPT | Mod: GO | Performed by: OCCUPATIONAL THERAPIST

## 2018-01-16 NOTE — PROGRESS NOTES
Hand Therapy  Progress Note  1/16/18  Reporting period: 12/7/17 to current date    S:  Subjective changes as noted by patient: Pt notes the other side of the wrist is now painful too. Pulling up socks or just moving my thumb causes a painful snap and catch.  Also the pain on the other side of the wrist (ulnar) is also still painful and  And painful when I go palm up. And difficult in lynda-care.    Functional changes noted by patient: Decreased in Performance in Self Care Tasks (dressing, hygiene/toileting)  Response to previous treatment:  fair  Patient has noted adverse reaction to:   None       Objective:  Pain Level Report  VAS(0-10) 12/8/2017 2/2/18   At Rest: 4/10 5/10   With Use: 5/10 8/10     Report of Pain:  Location:  wrist and hand  Pain Quality:  The pain has really gotten worse  Frequency: constant  And this is different than last week  Pain is worst:  daytime  Exacerbated by:  Movement   Relieved by:  Rest    Radial Wrist: Provocative Tests  Radial Wrist Zone: Provocative Tests: 1/16/2018    Pain Report:  - none    + mild    ++ moderate    +++ severe  Right Left   Finkelstein's Test ++    Dequervain's: APL test +    EPB test:  +    Intersection: APL/EPB & ECRB/L test -    STT Test (OA of same) -    ECU stress test:  ++    ECU: supination overpressure +        ROM:  Pain Report:  - none    + mild    ++ moderate    +++ severe   Wrist  12/8/17 12/13/17 12/13/17 12/18/17 12/26/17 1/2/18 1/8   AROM (PROM) R L R R   R   Extension 15 65 35 35  P: 45 35 40 45   Flexion 20 62 32 25  P: 30 26 41 40   RD 0 18 18  15 12    UD 0 32 15  15 23    Supination 45 85 75 65 70 74    Pronation 90 90 90          Fingers  Extension/Flexion, AROM(PROM)  Date:   12/13 1/2/18 1/8   Side:  Right     AROM(PROM)        Index:  MP               PIP               DIP  75  95  50 -14/72  0/84  0/35  177 0/82  0892  0/40    Long:   MP               PIP               DIP  71  97  61 0/80  0/95  0/50  225     Ring:    MP                 PIP                DIP  70  97  55 0/77  0/97  0/55  229     Small:   MP                PIP                DIP  75  95  58 -7/80  -4/94  0/58  221    Thumb:  MP                IP                RABD                PABD  25/61  +/30  45  55 -19/55  +/40  51  53      Assessment:  Response to therapy has been decline in:  ROM of Wrist:  All Planes  Thumb:  All Planes  Edema:  More at the wrist on both sides. Note sent to the MD about this recent pain  Pain:  frequency is more, intensity of pain has increased, duration of pain is increased and more tender over affected area    Overall Assessment:  Patient has experienced an exacerbation of symptoms.  STG/LTG:  STGoals have been reviewed and no progress has been made;  see goal sheet for details and changes.  I have re-evaluated this patient and find that the nature, scope, duration and intensity of the therapy is appropriate for the medical condition of the patient.    P:  Frequency/Duration:  Recommend continuing with the current treatment plan. 1 X week, once daily  for 6 more weeks per authorized visits    Continue per POC of 12/7/17    Pt fit with wrist / thumb orthosis for radial sided pain        Home Program:   AROM of wrist and forearm all planes  Tendon gliding fist series  Forearm based wrist orthosis-weaning  Avoid activities that exacerbate pain   Thumb AROM - composite flexion, IPJ blocking, opposition  Added Velcro strapping to orthosis to assist with self removal  12/18/2017  Flex strap for IF-MF-RF daily 10-30 min x4 per day  EDC glides with different wrist positions  12/21/2017  Prayer stretch, flexion PROM, continue finger flexion straps, out of ortoses at work as able, for light duty  FPL flexion with wrist ext/flex, and claw with wrist flex/ext for tendon adhesion reduction  1/8/2018  Pen roll  Icing to ECU and avoid painful motions.     Next Visit:  Cont scar massage   AROM - wrist, fingers, thumb  Watch for FPL adhesions/stiffness  Tendon glides

## 2018-01-16 NOTE — MR AVS SNAPSHOT
After Visit Summary   1/16/2018    Justice Yee    MRN: [id number]           Patient Information     Date Of Birth          1965        Visit Information        Provider Department      1/16/2018 3:00 PM Delilah Rahman OT [facility] Hand Therapy        Today's Diagnoses     Wrist stiffness, right    -  1    Other intraarticular fracture of lower end of right radius, subsequent encounter for closed fracture with routine healing        Stiffness of finger joint, right           Follow-ups after your visit        Your next 10 appointments already scheduled     Jan 18, 2018  4:00 PM CST   (Arrive by 3:45 PM)   POST-OP HAND with Carlitos Woodard MD   [facility] ([facility])    [address] [place] [id number] [phone]            Jan 18, 2018  4:30 PM CST   SHALOM Hand with Delilah Rahman OT   [facility] ([facility])    [address] [place] [id number] [phone]              Who to contact     If you have questions or need follow up information about today's clinic visit or your schedule please contact [facility] directly at [phone].  Normal or non-critical lab and imaging results will be communicated to you by MyChart, letter or phone within 4 business days after the clinic has received the results. If you do not hear from us within 7 days, please contact the clinic through MyChart or phone. If you have a critical or abnormal lab result, we will notify you by phone as soon as possible.  Submit refill requests through EVERYWARE or call your pharmacy and they will forward the refill request to us. Please allow 3 business days for your refill to be completed.          Additional Information About Your Visit        [employer] Information     EVERYWARE lets you send messages to your doctor, view your test results, renew your prescriptions, schedule  "appointments and more. To sign up, go to [email or web address] . Click on \"Log in\" on the left side of the screen, which will take you to the Welcome page. Then click on \"Sign up Now\" on the right side of the page.     You will be asked to enter the access code listed below, as well as some personal information. Please follow the directions to create your username and password.     Your access code is: [email or web address]  Expires: 2018  3:06 PM     Your access code will  in 90 days. If you need help or a new code, please call your [place] clinic or [phone].        Care EveryWhere ID     This is your Care EveryWhere ID. This could be used by other organizations to access your [place] medical records  [id number]         Blood Pressure from Last 3 Encounters:   17 128/83   17 156/85    Weight from Last 3 Encounters:   17 96.2 kg (212 lb)   17 96.2 kg (212 lb)   17 97.1 kg (214 lb)              We Performed the Following     MANUAL THER TECH,1+REGIONS,EA 15 MIN     ORTHOTIC MGMT AND TRAINING, EACH 15 MIN     SELF CARE MNGMENT TRAINING     THERAPEUTIC EXERCISES     ULTRASOUND THERAPY        Primary Care Provider Fax #    Physician No Ref-Primary [phone]       No address on file        Equal Access to Services     DANIEL HANSEN : Hadii orlando perezo Sorubén, waaxda luqadaha, qaybta kaalmada virgilio, sandra waters . So [facility] [phone].    ATENCIÓN: Si habla español, tiene a otero disposición servicios gratuitos de asistencia lingüística. Siobhan al [phone].    We comply with applicable federal civil rights laws and [place] laws. We do not discriminate on the basis of race, color, national origin, age, disability, sex, sexual orientation, or gender identity.            Thank you!     Thank you for choosing [facility]  for your care. Our goal is always to provide you with excellent care. Hearing back from our patients is one way we " can continue to improve our services. Please take a few minutes to complete the written survey that you may receive in the mail after your visit with us. Thank you!             Your Updated Medication List - Protect others around you: Learn how to safely use, store and throw away your medicines at www.disposemymeds.org.          This list is accurate as of: 1/16/18  5:49 PM.  Always use your most recent med list.                   Brand Name Dispense Instructions for use Diagnosis    TYLENOL PO      Take 320 mg by mouth

## 2018-01-18 ENCOUNTER — OFFICE VISIT (OUTPATIENT)
Dept: ORTHOPEDICS | Facility: CLINIC | Age: 53
End: 2018-01-18
Payer: OTHER MISCELLANEOUS

## 2018-01-18 ENCOUNTER — THERAPY VISIT (OUTPATIENT)
Dept: OCCUPATIONAL THERAPY | Facility: CLINIC | Age: 53
End: 2018-01-18
Payer: OTHER MISCELLANEOUS

## 2018-01-18 DIAGNOSIS — M25.531 WRIST PAIN, ACUTE, RIGHT: ICD-10-CM

## 2018-01-18 DIAGNOSIS — M25.631 WRIST STIFFNESS, RIGHT: ICD-10-CM

## 2018-01-18 DIAGNOSIS — S52.571G OTHER CLOSED INTRA-ARTICULAR FRACTURE OF DISTAL END OF RIGHT RADIUS WITH DELAYED HEALING, SUBSEQUENT ENCOUNTER: Primary | ICD-10-CM

## 2018-01-18 DIAGNOSIS — M25.641 STIFFNESS OF FINGER JOINT, RIGHT: Primary | ICD-10-CM

## 2018-01-18 DIAGNOSIS — S52.571D OTHER INTRAARTICULAR FRACTURE OF LOWER END OF RIGHT RADIUS, SUBSEQUENT ENCOUNTER FOR CLOSED FRACTURE WITH ROUTINE HEALING: ICD-10-CM

## 2018-01-18 PROCEDURE — 99207 C NO CHARGE LOS: CPT | Performed by: OCCUPATIONAL THERAPIST

## 2018-01-18 NOTE — LETTER
1/18/2018       RE: Justice Yee  [address]   [place] [address]     Dear Colleague,    Thank you for referring your patient, Justice Yee, to the [facility] at [facility]. Please see a copy of my visit note below.    Patient returns for a postoperative follow-up after open reduction internal fixation of right distal radius fracture.    INTERVAL HISTORY: Patient reports new pain development along the radial and ulnar aspects of the distal forearm, noticed during hand therapy exercises.  This is accompanied by generalized swelling in the wrist and hand along with intermittent discomfort in the long finger proximal interphalangeal joint.  Patient has been returning to work but without significant responsibilities.  Denies any numbness or tingling.    PHYSICAL EXAMINATION:  General: In no acute distress.  On exam of the right upper extremity, patient has a well-healed volar wrist incision.  Compared to the contralateral side, the right wrist and hand are moderately swollen.  There is tenderness on palpation of the first dorsal compartment along the wrist and a positive Finkelstein maneuver.  There is also tenderness along palpation of the ulna head and course of the extensor carpi ulnaris tendon.  There is no snapping or subluxation that I can appreciate both in pronation and supination of the wrist.  The long finger proximal interphalangeal joint is also tender to palpation, though it is not significantly more swollen than the surrounding tissues.  Patient is able to grossly flex and extend all digits.  There is no Tinel's at the volar wrist.  Sensation to light touch is intact in all III nerve distributions.    ASSESSMENT: 2 months status post open reduction internal fixation of right distal radius intra-articular fracture.  Patient seems to have developed de Quervain's and extensor carpi ulnaris tendinitis during the past month of hand therapy  exercises.    PLAN: I recommended that we trial a course of scheduled nonsteroidal anti-inflammatory medications such as Advil or Aleve.  I explained to the patient that this would potentially decrease his swelling and discomfort that he is feeling in his wrist and pain.  I also recommended that he take these medications with meals so as to prevent gastric irritation.  I also offered steroid injection to the first dorsal compartment and extensor carpi ulnaris with the thought that this may help with the decrease in inflammation.  Patient was agreeable to this and wished to proceed.  Otherwise, I will allow the patient to start gentle strengthening and we will see him back in 4-6 weeks.    Total time spent with patient was 15 min of which greater than 50% was in counseling.    Procedure note: Injection of steroid to right wrist first dorsal compartment and extensor carpi ulnaris tendon.  Consent was obtained.  1 cc of Kenalog 10 and 1 cc of 1% lidocaine were mixed to obtain a 2 cc steroid solution.  Under sterile conditions, 1 cc of the steroid solution was injected into the first dorsal compartment and the other 1 cc injected into the extensor carpi ulnaris course at the wrist.  Patient tolerated the procedure well.    Again, thank you for allowing me to participate in the care of your patient.      Sincerely,    Carlitos Woodard MD

## 2018-01-18 NOTE — LETTER
Date:January 23, 2018      Patient was self referred, no letter generated. Do not send.        [facility] Information

## 2018-01-18 NOTE — PROGRESS NOTES
Patient cancelled appointment per MD. He was seen by the MD but then due to injections, this appt was cx for today.  Disregard encounter for this date.

## 2018-01-18 NOTE — NURSING NOTE
Reason For Visit:   Chief Complaint   Patient presents with     Surgical Followup     POP ORIF of Right distal radius fracture.  DOS: 2017       Primary MD: No Ref-Primary, Physician  Ref. MD: Self    Age: 52 year old    ?  No      There were no vitals taken for this visit.      Pain Assessment  Patient Currently in Pain: Yes  0-10 Pain Scale: 6  Primary Pain Location: Other (Comment) (wrist and elbow)  Pain Orientation: Right  Pain Descriptors: Sharp (Elbow is aching)  Alleviating Factors: Ice, Pain medication  Aggravating Factors: Other (comment) (getting dressed, opeing door, twisting motions)               QuickDASH Assessment  No flowsheet data found.       Current Outpatient Prescriptions   Medication Sig Dispense Refill     Acetaminophen (TYLENOL PO) Take 320 mg by mouth         No Known Allergies    Paola Raman [facility]  [address] [place] [id number]  Dept: [phone]  ______________________________________________________________________________    Patient: Justice Yee   : 1965   MRN: [id number]   2018    INVASIVE PROCEDURE SAFETY CHECKLIST    Date: 2017   Procedure:Right wrist steroid injection  Patient Name: Justice Yee  MRN: [id number]  YOB: 1965    Action: Complete sections as appropriate. Any discrepancy results in a HARD COPY until resolved.     PRE PROCEDURE:  Patient ID verified with 2 identifiers (name and  or MRN): Yes  Procedure and site verified with patient/designee (when able): Yes  Accurate consent documentation in medical record: Yes  H&P (or appropriate assessment) documented in medical record: NA  H&P must be up to 20 days prior to procedure and updates within 24 hours of procedure as applicable: NA  Relevant diagnostic and radiology test results appropriately labeled and displayed as applicable: Yes  Procedure site(s) marked with provider initials:  NA    TIMEOUT:  Time-Out performed immediately prior to starting procedure, including verbal and active participation of all team members addressing the following:Yes  * Correct patient identify  * Confirmed that the correct side and site are marked  * An accurate procedure consent form  * Agreement on the procedure to be done  * Correct patient position  * Relevant images and results are properly labeled and appropriately displayed  * The need to administer antibiotics or fluids for irrigation purposes during the procedure as applicable   * Safety precautions based on patient history or medication use    DURING PROCEDURE: Verification of correct person, site, and procedures any time the responsibility for care of the patient is transferred to another member of the care team.     The following medication was given:     MEDICATION:  Kenalog 10 mg  ROUTE: IM  SITE: right wrist  DOSE: 1 ml  LOT #: [id number]  : [email or web address]  EXPIRATION DATE: MAR 2019  NDC#: 9216-8921-98   Was there drug waste? Yes  Amount of drug waste (mL): 4.  Reason for waste:  Single use vial     The following medication was given:     MEDICATION:  Lidocaine without epinephrine  ROUTE: IM  SITE: Right wrist  DOSE: 1 ml  LOT #: [id number]  : [employer]  EXPIRATION DATE: 09/21  NDC#: 26966-915-47   Was there drug waste? Yes  Amount of drug waste (mL): 19.  Reason for waste:  Single use vial    Teaching Flowsheet   Relevant Diagnosis: Right deQuervain and ECU tendenitis  Teaching Topic: Right wrist steroid injection     Person(s) involved in teaching:   Patient     Motivation Level:  Asks Questions: Yes  Eager to Learn: Yes  Cooperative: Yes  Receptive (willing/able to accept information): Yes  Any cultural factors/Confucianism beliefs that may influence understanding or compliance? No       Patient demonstrates understanding of the following:  Reason for the appointment, diagnosis and treatment plan: Yes  Knowledge of  proper use of medications and conditions for which they are ordered (with special attention to potential side effects or drug interactions): Yes  Which situations necessitate calling provider and whom to contact: Yes       Teaching Concerns Addressed:        Proper use and care of NA (medical equip, care aids, etc.): NA  Nutritional needs and diet plan: NA  Pain management techniques: NA  Wound Care: NA  How and/when to access community resources: NA     Instructional Materials Used/Given: verbal           Paola Raman ATC  January 18, 2018

## 2018-01-18 NOTE — MR AVS SNAPSHOT
After Visit Summary   1/18/2018    Justice Yee    MRN: [id number]           Patient Information     Date Of Birth          1965        Visit Information        Provider Department      1/18/2018 4:00 PM Carlitos Woodard MD [facility]        Today's Diagnoses     Other closed intra-articular fracture of distal end of right radius with delayed healing, subsequent encounter    -  1    Wrist pain, acute, right           Follow-ups after your visit        Additional Services     HAND THERAPY       Hand Therapy Referral                  Follow-up notes from your care team     Return in about 6 weeks (around 3/1/2018).      Your next 10 appointments already scheduled     Mar 01, 2018  4:00 PM CST   (Arrive by 3:45 PM)   RETURN HAND with Carlitos Woodard MD   [facility] ([facility])    [address] [place] [address] [phone]              Who to contact     Please call your clinic at [phone] to:    Ask questions about your health    Make or cancel appointments    Discuss your medicines    Learn about your test results    Speak to your doctor   If you have compliments or concerns about an experience at your clinic, or if you wish to file a complaint, please contact [facility] Patient Relations at [phone] or email us at [email or web address]         Additional Information About Your Visit        MyChart Information     SynapSense is an electronic gateway that provides easy, online access to your medical records. With SynapSense, you can request a clinic appointment, read your test results, renew a prescription or communicate with your care team.     To sign up for [employer] visit the website at [email or web address]   You will be asked to enter the access code listed below, as well as some personal information. Please follow the directions to create your username and  password.     Your access code is: [email or web address]  Expires: 2018  3:06 PM     Your access code will  in 90 days. If you need help or a new code, please contact your [facility] or call [phone] for assistance.        Care EveryWhere ID     This is your Care EveryWhere ID. This could be used by other organizations to access your [place] medical records  [id number]         Blood Pressure from Last 3 Encounters:   17 128/83   17 156/85    Weight from Last 3 Encounters:   17 212 lb   17 212 lb   17 214 lb              We Performed the Following     HAND THERAPY     Kenalog 10 MG         []     Lidocaine 1% or 2%     []        Primary Care Provider Fax #    Physician No Ref-Primary [phone]       No address on file        Equal Access to Services     [facility]LEIMA [facility] HospitalBRENDON : Hadii orlando perezo Sorubén, waaxda lujonathanadaha, qaybta kaalmada ademasteryada, sandra waters . So [facility] [phone].    ATENCIÓN: Si habla español, tiene a otero disposición servicios gratuitos de asistencia lingüística. Llame al [phone].    We comply with applicable federal civil rights laws and [place] laws. We do not discriminate on the basis of race, color, national origin, age, disability, sex, sexual orientation, or gender identity.            Thank you!     Thank you for choosing [facility]  for your care. Our goal is always to provide you with excellent care. Hearing back from our patients is one way we can continue to improve our services. Please take a few minutes to complete the written survey that you may receive in the mail after your visit with us. Thank you!             Your Updated Medication List - Protect others around you: Learn how to safely use, store and throw away your medicines at www.disposemymeds.org.          This list is accurate as of: 18 11:59 PM.  Always use your most recent med list.                    Brand Name Dispense Instructions for use Diagnosis    TYLENOL PO      Take 320 mg by mouth

## 2018-01-18 NOTE — LETTER
Return to Work  2018     Seen today: yes    Patient:  Justice Yee  :   1965  MRN:     [id number]  Physician: CARLITOS SOARES may return to work on Date: 2018.      The next clinic appointment is scheduled for (date/time) in 6 weeks from today.    Patient limitations:  Must keep right hand elevated above heart level at all times. Must be allowed to keep right wrist in splint and given frequent breaks for hand therapy exercises. No weight bearing with right hand, no repetitive or strenuous activities with right hand.            Electronically signed by Carlitos Soares MD

## 2018-01-18 NOTE — MR AVS SNAPSHOT
"              After Visit Summary   1/18/2018    Justice Yee    MRN: [id number]           Patient Information     Date Of Birth          1965        Visit Information        Provider Department      1/18/2018 4:30 PM Delilah Rahman OT [facility] Hand Therapy        Today's Diagnoses     Stiffness of finger joint, right    -  1    Other intraarticular fracture of lower end of right radius, subsequent encounter for closed fracture with routine healing        Wrist stiffness, right           Follow-ups after your visit        Your next 10 appointments already scheduled     Mar 01, 2018  4:00 PM CST   (Arrive by 3:45 PM)   RETURN HAND with Carlitos Woodard MD   [facility] ([facility])    [address]  [address] [facility] [address] [phone]              Who to contact     If you have questions or need follow up information about today's clinic visit or your schedule please contact [facility] HAND THERAPY directly at [phone].  Normal or non-critical lab and imaging results will be communicated to you by UXArmyhart, letter or phone within 4 business days after the clinic has received the results. If you do not hear from us within 7 days, please contact the clinic through UXArmyhart or phone. If you have a critical or abnormal lab result, we will notify you by phone as soon as possible.  Submit refill requests through LocBox or call your pharmacy and they will forward the refill request to us. Please allow 3 business days for your refill to be completed.          Additional Information About Your Visit        UXArmyharSpiced Bits Information     LocBox lets you send messages to your doctor, view your test results, renew your prescriptions, schedule appointments and more. To sign up, go to [email or web address] . Click on \"Log in\" on the left side of the screen, which will take you to the Welcome page. Then click on \"Sign up Now\" on the right side of the page. "     You will be asked to enter the access code listed below, as well as some personal information. Please follow the directions to create your username and password.     Your access code is: [email or web address]  Expires: 2018  3:06 PM     Your access code will  in 90 days. If you need help or a new code, please call your [facility] clinic or [phone].        Care EveryWhere ID     This is your Care EveryWhere ID. This could be used by other organizations to access your [facility] medical records  [id number]         Blood Pressure from Last 3 Encounters:   17 128/83   17 156/85    Weight from Last 3 Encounters:   17 96.2 kg (212 lb)   17 96.2 kg (212 lb)   17 97.1 kg (214 lb)              We Performed the Following     NO CHARGE LOS        Primary Care Provider Fax #    Physician No Ref-Primary [phone]       No address on file        Equal Access to Services     [facility] : Hadii aad ku hadasho Soomaali, waaxda luqadaha, qaybta kaalmada adeegyada, waxay jaylinin leonel waters . So [facility] [phone].    ATENCIÓN: Si habla español, tiene a otero disposición servicios gratuitos de asistencia lingüística. Llame al [phone].    We comply with applicable federal civil rights laws and [place] laws. We do not discriminate on the basis of race, color, national origin, age, disability, sex, sexual orientation, or gender identity.            Thank you!     Thank you for choosing [facility] HAND THERAPY  for your care. Our goal is always to provide you with excellent care. Hearing back from our patients is one way we can continue to improve our services. Please take a few minutes to complete the written survey that you may receive in the mail after your visit with us. Thank you!             Your Updated Medication List - Protect others around you: Learn how to safely use, store and throw away your medicines at www.disposemymeds.org.          This list is accurate as of:  1/18/18  5:07 PM.  Always use your most recent med list.                   Brand Name Dispense Instructions for use Diagnosis    TYLENOL PO      Take 320 mg by mouth

## 2018-01-22 NOTE — PROGRESS NOTES
Patient returns for a postoperative follow-up after open reduction internal fixation of right distal radius fracture.    INTERVAL HISTORY: Patient reports new pain development along the radial and ulnar aspects of the distal forearm, noticed during hand therapy exercises.  This is accompanied by generalized swelling in the wrist and hand along with intermittent discomfort in the long finger proximal interphalangeal joint.  Patient has been returning to work but without significant responsibilities.  Denies any numbness or tingling.    PHYSICAL EXAMINATION:  General: In no acute distress.  On exam of the right upper extremity, patient has a well-healed volar wrist incision.  Compared to the contralateral side, the right wrist and hand are moderately swollen.  There is tenderness on palpation of the first dorsal compartment along the wrist and a positive Finkelstein maneuver.  There is also tenderness along palpation of the ulna head and course of the extensor carpi ulnaris tendon.  There is no snapping or subluxation that I can appreciate both in pronation and supination of the wrist.  The long finger proximal interphalangeal joint is also tender to palpation, though it is not significantly more swollen than the surrounding tissues.  Patient is able to grossly flex and extend all digits.  There is no Tinel's at the volar wrist.  Sensation to light touch is intact in all III nerve distributions.    ASSESSMENT: 2 months status post open reduction internal fixation of right distal radius intra-articular fracture.  Patient seems to have developed de Quervain's and extensor carpi ulnaris tendinitis during the past month of hand therapy exercises.    PLAN: I recommended that we trial a course of scheduled nonsteroidal anti-inflammatory medications such as Advil or Aleve.  I explained to the patient that this would potentially decrease his swelling and discomfort that he is feeling in his wrist and pain.  I also recommended  that he take these medications with meals so as to prevent gastric irritation.  I also offered steroid injection to the first dorsal compartment and extensor carpi ulnaris with the thought that this may help with the decrease in inflammation.  Patient was agreeable to this and wished to proceed.  Otherwise, I will allow the patient to start gentle strengthening and we will see him back in 4-6 weeks.    Total time spent with patient was 15 min of which greater than 50% was in counseling.    Procedure note: Injection of steroid to right wrist first dorsal compartment and extensor carpi ulnaris tendon.  Consent was obtained.  1 cc of Kenalog 10 and 1 cc of 1% lidocaine were mixed to obtain a 2 cc steroid solution.  Under sterile conditions, 1 cc of the steroid solution was injected into the first dorsal compartment and the other 1 cc injected into the extensor carpi ulnaris course at the wrist.  Patient tolerated the procedure well.

## 2018-02-02 ENCOUNTER — THERAPY VISIT (OUTPATIENT)
Dept: OCCUPATIONAL THERAPY | Facility: CLINIC | Age: 53
End: 2018-02-02
Payer: OTHER MISCELLANEOUS

## 2018-02-02 DIAGNOSIS — M25.631 WRIST STIFFNESS, RIGHT: ICD-10-CM

## 2018-02-02 DIAGNOSIS — M25.641 STIFFNESS OF FINGER JOINT, RIGHT: ICD-10-CM

## 2018-02-02 DIAGNOSIS — M25.531 RIGHT WRIST PAIN: Primary | ICD-10-CM

## 2018-02-02 DIAGNOSIS — S52.571D OTHER INTRAARTICULAR FRACTURE OF LOWER END OF RIGHT RADIUS, SUBSEQUENT ENCOUNTER FOR CLOSED FRACTURE WITH ROUTINE HEALING: ICD-10-CM

## 2018-02-02 PROCEDURE — 97110 THERAPEUTIC EXERCISES: CPT | Mod: GO | Performed by: OCCUPATIONAL THERAPIST

## 2018-02-02 PROCEDURE — 97035 APP MDLTY 1+ULTRASOUND EA 15: CPT | Mod: GO | Performed by: OCCUPATIONAL THERAPIST

## 2018-02-02 PROCEDURE — 97140 MANUAL THERAPY 1/> REGIONS: CPT | Mod: GO | Performed by: OCCUPATIONAL THERAPIST

## 2018-02-13 ENCOUNTER — THERAPY VISIT (OUTPATIENT)
Dept: OCCUPATIONAL THERAPY | Facility: CLINIC | Age: 53
End: 2018-02-13
Payer: OTHER MISCELLANEOUS

## 2018-02-13 DIAGNOSIS — S52.571D OTHER INTRAARTICULAR FRACTURE OF LOWER END OF RIGHT RADIUS, SUBSEQUENT ENCOUNTER FOR CLOSED FRACTURE WITH ROUTINE HEALING: ICD-10-CM

## 2018-02-13 DIAGNOSIS — M25.641 STIFFNESS OF FINGER JOINT, RIGHT: Primary | ICD-10-CM

## 2018-02-13 DIAGNOSIS — M25.631 WRIST STIFFNESS, RIGHT: ICD-10-CM

## 2018-02-13 DIAGNOSIS — M25.531 RIGHT WRIST PAIN: ICD-10-CM

## 2018-02-13 PROCEDURE — 97110 THERAPEUTIC EXERCISES: CPT | Mod: GO | Performed by: OCCUPATIONAL THERAPIST

## 2018-02-13 PROCEDURE — 97140 MANUAL THERAPY 1/> REGIONS: CPT | Mod: GO | Performed by: OCCUPATIONAL THERAPIST

## 2018-02-13 PROCEDURE — 97035 APP MDLTY 1+ULTRASOUND EA 15: CPT | Mod: GO | Performed by: OCCUPATIONAL THERAPIST

## 2018-02-13 NOTE — MR AVS SNAPSHOT
After Visit Summary   2/13/2018    Justice Yee    MRN: [id number]           Patient Information     Date Of Birth          1965        Visit Information        Provider Department      2/13/2018 5:00 PM Delilah Rahman OT [facility]        Today's Diagnoses     Stiffness of finger joint, right    -  1    Right wrist pain        Other intraarticular fracture of lower end of right radius, subsequent encounter for closed fracture with routine healing        Wrist stiffness, right           Follow-ups after your visit        Your next 10 appointments already scheduled     Feb 20, 2018  4:30 PM CST   SHALOM Hand with Delilah Rahman OT   [facility] ([facility])    [address] [place] [id number] [phone]            Feb 27, 2018  4:30 PM CST   SHALOM Hand with Delilah Rahman OT   [facility] ([facility])    [address] [place] [id number] [phone]            Mar 01, 2018  4:00 PM CST   (Arrive by 3:45 PM)   RETURN HAND with Carlitos Woodard MD   [facility] ([facility])    [address] [place] [id number] [phone]              Who to contact     If you have questions or need follow up information about today's clinic visit or your schedule please contact [facility] HAND THERAPY directly at [phone].  Normal or non-critical lab and imaging results will be communicated to you by MyChart, letter or phone within 4 business days after the clinic has received the results. If you do not hear from us within 7 days, please contact the clinic through MyChart or phone. If you have a critical or abnormal lab result, we will notify you by phone as soon as possible.  Submit refill requests through [employer] or call your pharmacy and they will forward the refill request to us. Please allow 3  "business days for your refill to be completed.          Additional Information About Your Visit        MyChart Information     Maana Mobile lets you send messages to your doctor, view your test results, renew your prescriptions, schedule appointments and more. To sign up, go to [email or web address] . Click on \"Log in\" on the left side of the screen, which will take you to the Welcome page. Then click on \"Sign up Now\" on the right side of the page.     You will be asked to enter the access code listed below, as well as some personal information. Please follow the directions to create your username and password.     Your access code is: [email or web address]  Expires: 2018  6:30 AM     Your access code will  in 90 days. If you need help or a new code, please call your [facility] clinic or [phone].        Care EveryWhere ID     This is your Care EveryWhere ID. This could be used by other organizations to access your [facility] medical records  [id number]         Blood Pressure from Last 3 Encounters:   17 128/83   17 156/85    Weight from Last 3 Encounters:   17 96.2 kg (212 lb)   17 96.2 kg (212 lb)   17 97.1 kg (214 lb)              We Performed the Following     SHALOM PROGRESS NOTES REPORT     MANUAL THER TECH,1+REGIONS,EA 15 MIN     THERAPEUTIC EXERCISES     ULTRASOUND THERAPY        Primary Care Provider Fax #    Physician No Ref-Primary [phone]       No address on file        Equal Access to Services     DANIEL HANSEN : Hadii orlando bah hadasho Soomaali, waaxda luqadaha, qaybta kaalmada adeegyada, sandra waters . So [facility] [phone].    ATENCIÓN: Si habla español, tiene a otero disposición servicios gratuitos de asistencia lingüística. Llame al [phone].    We comply with applicable federal civil rights laws and [place] laws. We do not discriminate on the basis of race, color, national origin, age, disability, sex, sexual orientation, or gender " identity.            Thank you!     Thank you for choosing [facility] HAND THERAPY  for your care. Our goal is always to provide you with excellent care. Hearing back from our patients is one way we can continue to improve our services. Please take a few minutes to complete the written survey that you may receive in the mail after your visit with us. Thank you!             Your Updated Medication List - Protect others around you: Learn how to safely use, store and throw away your medicines at www.disposemymeds.org.          This list is accurate as of 2/13/18  5:42 PM.  Always use your most recent med list.                   Brand Name Dispense Instructions for use Diagnosis    TYLENOL PO      Take 320 mg by mouth

## 2018-02-20 ENCOUNTER — THERAPY VISIT (OUTPATIENT)
Dept: OCCUPATIONAL THERAPY | Facility: CLINIC | Age: 53
End: 2018-02-20
Payer: OTHER MISCELLANEOUS

## 2018-02-20 DIAGNOSIS — M25.631 WRIST STIFFNESS, RIGHT: ICD-10-CM

## 2018-02-20 DIAGNOSIS — S52.571D OTHER INTRAARTICULAR FRACTURE OF LOWER END OF RIGHT RADIUS, SUBSEQUENT ENCOUNTER FOR CLOSED FRACTURE WITH ROUTINE HEALING: ICD-10-CM

## 2018-02-20 DIAGNOSIS — M25.531 RIGHT WRIST PAIN: ICD-10-CM

## 2018-02-20 DIAGNOSIS — M25.641 STIFFNESS OF FINGER JOINT, RIGHT: ICD-10-CM

## 2018-02-20 DIAGNOSIS — M65.4 RADIAL STYLOID TENOSYNOVITIS: Primary | ICD-10-CM

## 2018-02-20 PROCEDURE — 97035 APP MDLTY 1+ULTRASOUND EA 15: CPT | Mod: GO | Performed by: OCCUPATIONAL THERAPIST

## 2018-02-20 PROCEDURE — 97140 MANUAL THERAPY 1/> REGIONS: CPT | Mod: GO | Performed by: OCCUPATIONAL THERAPIST

## 2018-02-20 PROCEDURE — 97110 THERAPEUTIC EXERCISES: CPT | Mod: GO | Performed by: OCCUPATIONAL THERAPIST

## 2018-02-20 PROCEDURE — 97530 THERAPEUTIC ACTIVITIES: CPT | Mod: GO | Performed by: OCCUPATIONAL THERAPIST

## 2018-02-20 NOTE — PROGRESS NOTES
Please refer to the daily flowsheet for treatment today, total treatment time and time spent performing 1:1 timed codes.

## 2018-02-20 NOTE — MR AVS SNAPSHOT
After Visit Summary   2/20/2018    Justice Yee    MRN: [id number]           Patient Information     Date Of Birth          1965        Visit Information        Provider Department      2/20/2018 4:30 PM Delilah Rahman OT [facility] Hand Therapy        Today's Diagnoses     Radial styloid tenosynovitis    -  1    Right wrist pain        Other intraarticular fracture of lower end of right radius, subsequent encounter for closed fracture with routine healing        Wrist stiffness, right        Stiffness of finger joint, right           Follow-ups after your visit        Your next 10 appointments already scheduled     Feb 22, 2018  5:00 PM CST   SHALOM Hand with Delilah Rahman OT   [facility] ([facility])    [address] [place] [id number] [phone]            Feb 27, 2018  4:30 PM CST   SHALOM Hand with Delilah Rahman OT   [facility] ([facility])    [address] [place] [id number] [phone]            Mar 01, 2018  4:00 PM CST   (Arrive by 3:45 PM)   RETURN HAND with Carlitos Woodard MD   [facility] ([facility])    [address] [place] [address] [phone]              Who to contact     If you have questions or need follow up information about today's clinic visit or your schedule please contact [facility] HAND THERAPY directly at [phone].  Normal or non-critical lab and imaging results will be communicated to you by MyChart, letter or phone within 4 business days after the clinic has received the results. If you do not hear from us within 7 days, please contact the clinic through MyChart or phone. If you have a critical or abnormal lab result, we will notify you by phone as soon as possible.  Submit refill requests through Aviasales or call your pharmacy and they will forward the refill  "request to us. Please allow 3 business days for your refill to be completed.          Additional Information About Your Visit        MyChart Information     [employer] lets you send messages to your doctor, view your test results, renew your prescriptions, schedule appointments and more. To sign up, go to [email or web address] . Click on \"Log in\" on the left side of the screen, which will take you to the Welcome page. Then click on \"Sign up Now\" on the right side of the page.     You will be asked to enter the access code listed below, as well as some personal information. Please follow the directions to create your username and password.     Your access code is: [email or web address]  Expires: 2018  6:30 AM     Your access code will  in 90 days. If you need help or a new code, please call your [facility] clinic or [phone].        Care EveryWhere ID     This is your Care EveryWhere ID. This could be used by other organizations to access your [facility] medical records  [id number]         Blood Pressure from Last 3 Encounters:   17 128/83   17 156/85    Weight from Last 3 Encounters:   17 96.2 kg (212 lb)   17 96.2 kg (212 lb)   17 97.1 kg (214 lb)              We Performed the Following     MANUAL THER TECH,1+REGIONS,EA 15 MIN     THERAPEUTIC ACTIVITIES     THERAPEUTIC EXERCISES     ULTRASOUND THERAPY        Primary Care Provider Fax #    Physician No Ref-Primary [phone]       No address on file        Equal Access to Services     DANIEL HANSEN : Hadii aad ku hadasho Soomaali, waaxda luqadaha, qaybta kaalmada adeegyada, waxay yuval waters . So [facility] [phone].    ATENCIÓN: Si habla español, tiene a otero disposición servicios gratuitos de asistencia lingüística. Llame al [phone].    We comply with applicable federal civil rights laws and [place] laws. We do not discriminate on the basis of race, color, national origin, age, disability, sex, sexual " orientation, or gender identity.            Thank you!     Thank you for choosing [facility] HAND THERAPY  for your care. Our goal is always to provide you with excellent care. Hearing back from our patients is one way we can continue to improve our services. Please take a few minutes to complete the written survey that you may receive in the mail after your visit with us. Thank you!             Your Updated Medication List - Protect others around you: Learn how to safely use, store and throw away your medicines at www.disposemymeds.org.          This list is accurate as of 2/20/18  5:18 PM.  Always use your most recent med list.                   Brand Name Dispense Instructions for use Diagnosis    TYLENOL PO      Take 320 mg by mouth

## 2018-02-27 ENCOUNTER — TELEPHONE (OUTPATIENT)
Dept: OCCUPATIONAL THERAPY | Facility: CLINIC | Age: 53
End: 2018-02-27

## 2018-02-27 ENCOUNTER — THERAPY VISIT (OUTPATIENT)
Dept: OCCUPATIONAL THERAPY | Facility: CLINIC | Age: 53
End: 2018-02-27
Payer: OTHER MISCELLANEOUS

## 2018-02-27 DIAGNOSIS — M25.531 RIGHT WRIST PAIN: Primary | ICD-10-CM

## 2018-02-27 DIAGNOSIS — M25.631 WRIST STIFFNESS, RIGHT: ICD-10-CM

## 2018-02-27 DIAGNOSIS — S52.571D OTHER INTRAARTICULAR FRACTURE OF LOWER END OF RIGHT RADIUS, SUBSEQUENT ENCOUNTER FOR CLOSED FRACTURE WITH ROUTINE HEALING: ICD-10-CM

## 2018-02-27 DIAGNOSIS — M25.531 RIGHT WRIST PAIN: ICD-10-CM

## 2018-02-27 DIAGNOSIS — M65.4 RADIAL STYLOID TENOSYNOVITIS: ICD-10-CM

## 2018-02-27 DIAGNOSIS — M25.641 STIFFNESS OF FINGER JOINT, RIGHT: ICD-10-CM

## 2018-02-27 PROCEDURE — 97140 MANUAL THERAPY 1/> REGIONS: CPT | Mod: GO | Performed by: OCCUPATIONAL THERAPIST

## 2018-02-27 PROCEDURE — 97035 APP MDLTY 1+ULTRASOUND EA 15: CPT | Mod: GO | Performed by: OCCUPATIONAL THERAPIST

## 2018-02-27 PROCEDURE — 97110 THERAPEUTIC EXERCISES: CPT | Mod: GO | Performed by: OCCUPATIONAL THERAPIST

## 2018-02-27 NOTE — TELEPHONE ENCOUNTER
A call placed to [employer], Adj: Ivy Barlow [phone] / CL# [id number] / DOI: 11-7-2017  to request 6 more visits.   Delilah BARRIOS, OTR/L, CHT

## 2018-02-27 NOTE — PROGRESS NOTES
Hand Therapy  Progress Note  2/27/2018  Initial Visit: 12/8/18  Total Visits: 16 of 18 authorized    S:  Subjective changes as noted by patient: Still having finger stiffness and difficulty pushing up with the right wrist/hand, able to eat, button, but pushing down to cut the food is still some painful. Turning the hand is better, but still stiff at the end range for some hygiene.   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  Patient reports symptoms of pain and stiffness/loss of motion of the right hand and wrist which occurred due to fall off ladder, at work.      2/27/2018  Upper Extremity Functional Index Score:  SCORE:   Column Totals: /80: 47   (A lower score indicates greater disability.)      Objective:  Pain Level Report  VAS(0-10) 12/8/2017 2/2/18 2/13/18 2/27/18   At Rest: 4/10 5/10 1-2/10 0/10   With Use: 5/10 8/10 2-4/10 6/10 pushing up from hands     Report of Pain:  Location:  wrist and hand  Pain Quality: remains, better over all  Frequency: mostly when pushing up from the hand, it scares me, pt notes he is apprehensive  Pain is worst:  Pushing up from sitting on the right hand  Exacerbated by:  Weight bearing  Relieved by:  Rest    Radial Wrist: Provocative Tests  Radial Wrist Zone: Provocative Tests: 1/16/2018 2/13/18 2/27/18   Pain Report:  - none    + mild    ++ moderate    +++ severe  Right right right   Finkelstein's Test ++ + ++   Dequervain's: APL test + + +   EPB test:  + + -   Intersection: APL/EPB & ECRB/L test - - -   ECU stress test:  ++ + -   ECU: supination overpressure + + +       ROM:  Pain Report:  - none    + mild    ++ moderate    +++ severe   Wrist  12/8/17 12/13/17 12/13/17 1/2/18 2/13/18 2/27/18   AROM (PROM) R L  R  Right  Right   Extension 15 65 35 40 50 54   Flexion 20 62 32 41 45 50   RD 0 18 18 12 10 20 + at Radial wrist   UD 0 32 15 23 35 30+ at Ulnar wrist   Supination 45 85 75 74 70    Pronation 90 90 90  90      ROM:  Thumb 2018   AROM(PROM) Right Left right   MP /60 /70 x/60   IP /45 /70 /52   RAbd 54+ 60 58   PAbd 54+ 55 55+   Retropulsion      Kapandji Opposition Scale (0-10/10)   9/10     7   Fingers  Extension/Flexion, AROM(PROM)  Date:   18   Side:  Right      AROM(PROM)         Index:  MP               PIP               DIP  FERRER:  75  95  50 -14/72  0/84  0/35  177 0/82  0/92  0/40 0/82  0/105  0/45  232    Long:   MP               PIP               DIP  FERRER:  71  97  61 0/80  0/95  0/50  225  0/85  0/110  0/65  260    Ring:    MP                PIP                DIP  FERRER:  70  97  55 0/77  0/97  0/55  229  0/82  0/105  0/65  212    Small:   MP                PIP                DIP  FERRER:  75  95  58 -7/80  -4/94  0/58  221  0/85  0/100  0/70  255   Thumb:  MP                IP                RABD                PABD  25/61  +/30  45  55 -19/55  +/40  51  53         STRENGTH:   (Measured in pounds)     2018      Trials Right Left Right Left   1  2  3  Av 81         3 pt Pinch 2018      Trials Right Left Right Left   1  2  3  Avg: 10+ at radial wrist 18       Lateral Pinch 2018      Trials Right Left Right Left   1  2  3  Av 19             Assessment:  Response to therapy has been improvement to:  ROM of Wrist:  All Planes and with residual pain at radial and ulnar deviation related to the secondary dx of tendonitis, which is resolving, not yet resolved  Thumb:  All Planes and with palmar abduction and flexion end ranges remain painful   Fingers: All Planes, with slight flexion lag end range only in the middle and ring fingers  Strength:   and pinch and limited due to radial wrist tendonitis pain  Pain:  frequency is less, intensity  of pain is decreased, duration of pain is decreased and less tender over affected area  Work Performance:  Pt remains on light duty, will see the MD on 3/1/18 for determination  Self Care Skills:  Independent in all self cares      Overall Assessment:  Patient would benefit from continued therapy to achieve rehab potential. Due to the tendonitis of the radial and ulnar wrist, it is recommended to continue therapy, and as that resolves, then progress to strengthening  STG/LTG:  STGoals have been reviewed and progress or achievement has occurred;  see goal sheet for details and updates.  LTGoals have been reviewed and progress or achievement has occurred:  see goal sheet for details and updates.  I have re-evaluated this patient and find that the nature, scope, duration and intensity of the therapy is appropriate for the medical condition of the patient.        P:  Frequency/Duration:  Recommend continuing with the current treatment plan. 1 X week, once daily  For 6 more weeks to focus on R radial wrist pain,  And to complete strengthening after resolution of wrist tendonitis       Recommendations for Continued Therapy  Treatment Plan:    Additions to Treatment Plan -  Modalities:  US  Therapeutic Exercise:  AROM, AAROM, Tendon Gliding, Isotonics and Isometrics        Home Program:   AROM of wrist and forearm all planes  Tendon gliding fist series  Forearm based wrist orthosis-weaning  Avoid activities that exacerbate pain   Thumb AROM - composite flexion, IPJ blocking, opposition  Added Velcro strapping to orthosis to assist with self removal  12/18/2017  Flex strap for IF-MF-RF daily 10-30 min x4 per day  EDC glides with different wrist positions  12/21/2017  Prayer stretch, flexion PROM, continue finger flexion straps, out of ortoses at work as able, for light duty  FPL flexion with wrist ext/flex, and claw with wrist flex/ext for tendon adhesion reduction  1/8/2018  Pen roll  Icing to ECU and avoid painful  motions.   2/13/2018  Continue wearing thumb spica for Dequervains, and ECU tendonitis  Continue thumb and Wrist ROM exercises    Next Visit:  Progress strengthening after tendonitis resolves

## 2018-02-27 NOTE — MR AVS SNAPSHOT
"              After Visit Summary   2/27/2018    Justice Yee    MRN: [id number]           Patient Information     Date Of Birth          1965        Visit Information        Provider Department      2/27/2018 4:30 PM Delilah Rahman OT [facility] Hand Therapy        Today's Diagnoses     Right wrist pain    -  1    Radial styloid tenosynovitis        Other intraarticular fracture of lower end of right radius, subsequent encounter for closed fracture with routine healing        Wrist stiffness, right        Stiffness of finger joint, right           Follow-ups after your visit        Your next 10 appointments already scheduled     Mar 01, 2018  4:00 PM CST   (Arrive by 3:45 PM)   RETURN HAND with Carlitos Woodard MD   [facility] ([facility])    [address]  [address] [place] [address] [phone]              Who to contact     If you have questions or need follow up information about today's clinic visit or your schedule please contact [facility] HAND THERAPY directly at [phone].  Normal or non-critical lab and imaging results will be communicated to you by Stylewhilehart, letter or phone within 4 business days after the clinic has received the results. If you do not hear from us within 7 days, please contact the clinic through sentitO Networkst or phone. If you have a critical or abnormal lab result, we will notify you by phone as soon as possible.  Submit refill requests through AtHoc or call your pharmacy and they will forward the refill request to us. Please allow 3 business days for your refill to be completed.          Additional Information About Your Visit        Stylewhilehart Information     AtHoc lets you send messages to your doctor, view your test results, renew your prescriptions, schedule appointments and more. To sign up, go to [email or web address] . Click on \"Log in\" on the left side of the screen, which will take you to the Welcome page. " "Then click on \"Sign up Now\" on the right side of the page.     You will be asked to enter the access code listed below, as well as some personal information. Please follow the directions to create your username and password.     Your access code is: [email or web address]  Expires: 2018  6:30 AM     Your access code will  in 90 days. If you need help or a new code, please call your [place] clinic or [phone].        Care EveryWhere ID     This is your Care EveryWhere ID. This could be used by other organizations to access your [place] medical records  [id number]         Blood Pressure from Last 3 Encounters:   17 128/83   17 156/85    Weight from Last 3 Encounters:   17 96.2 kg (212 lb)   17 96.2 kg (212 lb)   17 97.1 kg (214 lb)              We Performed the Following     MANUAL THER TECH,1+REGIONS,EA 15 MIN     THERAPEUTIC EXERCISES     ULTRASOUND THERAPY        Primary Care Provider Fax #    Physician No Ref-Primary [phone]       No address on file        Equal Access to Services     DANIEL HANSEN : Hadii orlando Malloy, jerman coto, pola poole, sandra waters . So [facility] [phone].    ATENCIÓN: Si habla español, tiene a otero disposición servicios gratuitos de asistencia lingüística. [facility] [phone].    We comply with applicable federal civil rights laws and [place] laws. We do not discriminate on the basis of race, color, national origin, age, disability, sex, sexual orientation, or gender identity.            Thank you!     Thank you for choosing [facility] HAND THERAPY  for your care. Our goal is always to provide you with excellent care. Hearing back from our patients is one way we can continue to improve our services. Please take a few minutes to complete the written survey that you may receive in the mail after your visit with us. Thank you!             Your Updated Medication List - Protect others around " you: Learn how to safely use, store and throw away your medicines at www.disposemymeds.org.          This list is accurate as of 2/27/18  5:53 PM.  Always use your most recent med list.                   Brand Name Dispense Instructions for use Diagnosis    TYLENOL PO      Take 320 mg by mouth

## 2018-03-01 ENCOUNTER — OFFICE VISIT (OUTPATIENT)
Dept: ORTHOPEDICS | Facility: CLINIC | Age: 53
End: 2018-03-01
Payer: OTHER MISCELLANEOUS

## 2018-03-01 DIAGNOSIS — S52.571G OTHER CLOSED INTRA-ARTICULAR FRACTURE OF DISTAL END OF RIGHT RADIUS WITH DELAYED HEALING, SUBSEQUENT ENCOUNTER: Primary | ICD-10-CM

## 2018-03-01 NOTE — LETTER
Date:March 2, 2018      Patient was self referred, no letter generated. Do not send.        [facility] Health Information

## 2018-03-01 NOTE — LETTER
3/1/2018       RE: Justice Yee  [address]   [place] [address]     Dear Colleague,    Thank you for referring your patient, Justice Yee, to the [facility] at [facility]. Please see a copy of my visit note below.    Patient returns for a postoperative follow-up after open reduction internal fixation of right distal radius fracture.    INTERVAL HISTORY: Patient is doing much better from a pain standpoint. Ulnar-sided wrist pain is resolved. He still has residual pain in his first dorsal compartment, but this is slowly improving with hand therapy. He is also seeing improvements in wrist range of motion with hand therapy. Denies any numbness or tingling.    PHYSICAL EXAMINATION:  Gen.: In no acute distress.  On exam of the right wrist and hand, the volar wrist incision is well-healed. Patient has near normal active range of motion at the wrist in terms of extension, flexion, radial and ulnar deviation, pronation and supination. He is able to make a composite fist and extend all digits. Finkelstein maneuver is painful. Palpation of extensor carpi ulnaris at the wrist is nonpainful. Palpation of the distal radius is nonpainful. Sensation to light touch is intact in all III nerve distributions. His sensation to light touch along the freitas is intact but slightly tingling.    ASSESSMENT: 3 months status post open reduction internal fixation of right distal radius intra-articular fracture. Has achieved bony healing, but complicated by residual yet improving de Quervain's.    PLAN: Patient is to continue with hand therapy as this seems to be helping with his de Quervain's. An updated work letter was given today with new restrictions. His restrictions at this point include 10 pound lifting restriction with the right hand, weightbearing as tolerated through the right hand, no repetitive or strenuous activities with the right hand, and need for frequent  breaks and elevation for swelling and rest. I will see the patient back in 4 weeks to track his progress.    Total time spent with patient was 15 min of which greater than 50% was in counseling.    Again, thank you for allowing me to participate in the care of your patient.      Sincerely,    Carlitos Woodard MD

## 2018-03-01 NOTE — NURSING NOTE
Reason For Visit:   Chief Complaint   Patient presents with     Surgical Followup     4 month follow up ORIF right distal radius.  DOS: 11/14/2017       Primary MD: No Ref-Primary, Physician  Ref. MD: Self    Age: 52 year old    ?  No      There were no vitals taken for this visit.      Pain Assessment  Patient Currently in Pain: Denies               QuickDASH Assessment  No flowsheet data found.       Current Outpatient Prescriptions   Medication Sig Dispense Refill     Naproxen Sodium (ALEVE PO) Take 220 mg by mouth 2 times daily (with meals)       Acetaminophen (TYLENOL PO) Take 320 mg by mouth         No Known Allergies    Paola Raman, ATC

## 2018-03-01 NOTE — PROGRESS NOTES
Patient returns for a postoperative follow-up after open reduction internal fixation of right distal radius fracture.    INTERVAL HISTORY: Patient is doing much better from a pain standpoint. Ulnar-sided wrist pain is resolved. He still has residual pain in his first dorsal compartment, but this is slowly improving with hand therapy. He is also seeing improvements in wrist range of motion with hand therapy. Denies any numbness or tingling.    PHYSICAL EXAMINATION:  Gen.: In no acute distress.  On exam of the right wrist and hand, the volar wrist incision is well-healed. Patient has near normal active range of motion at the wrist in terms of extension, flexion, radial and ulnar deviation, pronation and supination. He is able to make a composite fist and extend all digits. Finkelstein maneuver is painful. Palpation of extensor carpi ulnaris at the wrist is nonpainful. Palpation of the distal radius is nonpainful. Sensation to light touch is intact in all III nerve distributions. His sensation to light touch along the freitas is intact but slightly tingling.    ASSESSMENT: 3 months status post open reduction internal fixation of right distal radius intra-articular fracture. Has achieved bony healing, but complicated by residual yet improving de Quervain's.    PLAN: Patient is to continue with hand therapy as this seems to be helping with his de Quervain's. An updated work letter was given today with new restrictions. His restrictions at this point include 10 pound lifting restriction with the right hand, weightbearing as tolerated through the right hand, no repetitive or strenuous activities with the right hand, and need for frequent breaks and elevation for swelling and rest. I will see the patient back in 4 weeks to track his progress.    Total time spent with patient was 15 min of which greater than 50% was in counseling.

## 2018-03-01 NOTE — MR AVS SNAPSHOT
After Visit Summary   3/1/2018    Justice Yee    MRN: [id number]           Patient Information     Date Of Birth          1965        Visit Information        Provider Department      3/1/2018 4:00 PM Carlitos Woodard MD [facility]        Today's Diagnoses     Other closed intra-articular fracture of distal end of right radius with delayed healing, subsequent encounter    -  1       Follow-ups after your visit        Additional Services     HAND THERAPY       Hand Therapy Referral for PRINCE, strengthening, and DeQuervain's                  Follow-up notes from your care team     Return in about 4 weeks (around 3/29/2018).      Who to contact     Please call your clinic at [phone] to:    Ask questions about your health    Make or cancel appointments    Discuss your medicines    Learn about your test results    Speak to your doctor            Additional Information About Your Visit        MyChart Information     License Buddy is an electronic gateway that provides easy, online access to your medical records. With License Buddy, you can request a clinic appointment, read your test results, renew a prescription or communicate with your care team.     To sign up for [facility] [facility] visit the website at [email or web address]   You will be asked to enter the access code listed below, as well as some personal information. Please follow the directions to create your username and password.     Your access code is: [email or web address]  Expires: 2018  6:30 AM     Your access code will  in 90 days. If you need help or a new code, please contact your [facility] or call [phone] for assistance.        Care EveryWhere ID     This is your Care EveryWhere ID. This could be used by other organizations to access your [place] medical records  [id number]         Blood Pressure from Last 3 Encounters:   17 128/83   17 156/85    Weight from Last 3 Encounters:    11/14/17 212 lb   11/13/17 212 lb   11/09/17 214 lb              We Performed the Following     HAND THERAPY        Primary Care Provider Fax #    Physician No Ref-Primary [phone]       No address on file        Equal Access to Services     YUEZULEIMA JADE : Zaid orlando bah laura Malloy, waaxda luqadaha, qaybta kaalmada virgilio, sandra castaneda jeanninemaster catalan evelin vela. So [facility] [phone].    ATENCIÓN: Si habla español, tiene a otero disposición servicios gratuitos de asistencia lingüística. Llame al [phone].    We comply with applicable federal civil rights laws and [place] laws. We do not discriminate on the basis of race, color, national origin, age, disability, sex, sexual orientation, or gender identity.            Thank you!     Thank you for choosing [facility] ORTHOPAEDIC CLINIC  for your care. Our goal is always to provide you with excellent care. Hearing back from our patients is one way we can continue to improve our services. Please take a few minutes to complete the written survey that you may receive in the mail after your visit with us. Thank you!             Your Updated Medication List - Protect others around you: Learn how to safely use, store and throw away your medicines at www.disposemymeds.org.          This list is accurate as of 3/1/18  4:43 PM.  Always use your most recent med list.                   Brand Name Dispense Instructions for use Diagnosis    ALEVE PO      Take 220 mg by mouth 2 times daily (with meals)        TYLENOL PO      Take 320 mg by mouth

## 2018-03-13 ENCOUNTER — THERAPY VISIT (OUTPATIENT)
Dept: OCCUPATIONAL THERAPY | Facility: CLINIC | Age: 53
End: 2018-03-13
Payer: OTHER MISCELLANEOUS

## 2018-03-13 ENCOUNTER — TELEPHONE (OUTPATIENT)
Dept: OCCUPATIONAL THERAPY | Facility: CLINIC | Age: 53
End: 2018-03-13

## 2018-03-13 DIAGNOSIS — M25.631 WRIST STIFFNESS, RIGHT: Primary | ICD-10-CM

## 2018-03-13 DIAGNOSIS — M65.4 RADIAL STYLOID TENOSYNOVITIS: ICD-10-CM

## 2018-03-13 DIAGNOSIS — M25.531 RIGHT WRIST PAIN: ICD-10-CM

## 2018-03-13 DIAGNOSIS — M25.631 WRIST STIFFNESS, RIGHT: ICD-10-CM

## 2018-03-13 DIAGNOSIS — M25.641 STIFFNESS OF FINGER JOINT, RIGHT: ICD-10-CM

## 2018-03-13 DIAGNOSIS — S52.571D OTHER INTRAARTICULAR FRACTURE OF LOWER END OF RIGHT RADIUS, SUBSEQUENT ENCOUNTER FOR CLOSED FRACTURE WITH ROUTINE HEALING: ICD-10-CM

## 2018-03-13 PROCEDURE — 97110 THERAPEUTIC EXERCISES: CPT | Mod: GO | Performed by: OCCUPATIONAL THERAPIST

## 2018-03-13 PROCEDURE — 97035 APP MDLTY 1+ULTRASOUND EA 15: CPT | Mod: GO | Performed by: OCCUPATIONAL THERAPIST

## 2018-03-13 PROCEDURE — 97530 THERAPEUTIC ACTIVITIES: CPT | Mod: GO | Performed by: OCCUPATIONAL THERAPIST

## 2018-03-13 NOTE — TELEPHONE ENCOUNTER
Call placed to [employer], Adj: Ivy Yen [phone] RE: # [id number] / DOI: 11-7-2017;   To request 6 more visits, a total of 24, to continue to address his continued wrist pain.  Delilah BARRIOS, OTR/L, CHT

## 2018-03-20 ENCOUNTER — THERAPY VISIT (OUTPATIENT)
Dept: OCCUPATIONAL THERAPY | Facility: CLINIC | Age: 53
End: 2018-03-20
Payer: OTHER MISCELLANEOUS

## 2018-03-20 DIAGNOSIS — M65.4 RADIAL STYLOID TENOSYNOVITIS: ICD-10-CM

## 2018-03-20 DIAGNOSIS — M25.631 WRIST STIFFNESS, RIGHT: ICD-10-CM

## 2018-03-20 DIAGNOSIS — M25.531 RIGHT WRIST PAIN: Primary | ICD-10-CM

## 2018-03-20 DIAGNOSIS — M25.641 STIFFNESS OF FINGER JOINT, RIGHT: ICD-10-CM

## 2018-03-20 DIAGNOSIS — S52.571D OTHER INTRAARTICULAR FRACTURE OF LOWER END OF RIGHT RADIUS, SUBSEQUENT ENCOUNTER FOR CLOSED FRACTURE WITH ROUTINE HEALING: ICD-10-CM

## 2018-03-20 PROCEDURE — 97110 THERAPEUTIC EXERCISES: CPT | Mod: GO | Performed by: OCCUPATIONAL THERAPIST

## 2018-03-20 PROCEDURE — 97035 APP MDLTY 1+ULTRASOUND EA 15: CPT | Mod: GO | Performed by: OCCUPATIONAL THERAPIST

## 2018-03-20 PROCEDURE — 97168 OT RE-EVAL EST PLAN CARE: CPT | Mod: GO | Performed by: OCCUPATIONAL THERAPIST

## 2018-03-20 PROCEDURE — 97140 MANUAL THERAPY 1/> REGIONS: CPT | Mod: GO | Performed by: OCCUPATIONAL THERAPIST

## 2018-03-20 NOTE — MR AVS SNAPSHOT
After Visit Summary   3/20/2018    Justice Yee    MRN: [id number]           Patient Information     Date Of Birth          1965        Visit Information        Provider Department      3/20/2018 4:00 PM Delilah Rahman OT [facility] Hand Therapy        Today's Diagnoses     Right wrist pain    -  1    Radial styloid tenosynovitis        Other intraarticular fracture of lower end of right radius, subsequent encounter for closed fracture with routine healing        Wrist stiffness, right        Stiffness of finger joint, right           Follow-ups after your visit        Your next 10 appointments already scheduled     Mar 27, 2018  2:00 PM CDT   SHALOM Hand with Delilah Rahman OT   [facility] Therapy ([facility])    [address] [place] [address] [phone]            Mar 29, 2018  3:15 PM CDT   (Arrive by 3:00 PM)   RETURN HAND with Carlitos Woodard MD   [facility] ([facility])    [address] [place] [address] [phone]            Apr 03, 2018  4:00 PM CDT   SHALOM Hand with Delilah Rahman OT   [facility] ([facility])    [address] [place] [address] [phone]              Who to contact     If you have questions or need follow up information about today's clinic visit or your schedule please contact [facility] HAND THERAPY directly at [phone].  Normal or non-critical lab and imaging results will be communicated to you by MyChart, letter or phone within 4 business days after the clinic has received the results. If you do not hear from us within 7 days, please contact the clinic through MyChart or phone. If you have a critical or abnormal lab result, we will notify you by phone as soon as possible.  Submit refill requests through [employer] or call your pharmacy and they will forward the refill  "request to us. Please allow 3 business days for your refill to be completed.          Additional Information About Your Visit        MyChart Information     Mobiquity lets you send messages to your doctor, view your test results, renew your prescriptions, schedule appointments and more. To sign up, go to [email or web address] . Click on \"Log in\" on the left side of the screen, which will take you to the Welcome page. Then click on \"Sign up Now\" on the right side of the page.     You will be asked to enter the access code listed below, as well as some personal information. Please follow the directions to create your username and password.     Your access code is: [email or web address]  Expires: 2018  7:30 AM     Your access code will  in 90 days. If you need help or a new code, please call your [place] clinic or [phone].        Care EveryWhere ID     This is your Care EveryWhere ID. This could be used by other organizations to access your [place] medical records  [id number]         Blood Pressure from Last 3 Encounters:   17 128/83   17 156/85    Weight from Last 3 Encounters:   17 96.2 kg (212 lb)   17 96.2 kg (212 lb)   17 97.1 kg (214 lb)              We Performed the Following     HC OT RE-EVAL     MANUAL THER TECH,1+REGIONS,EA 15 MIN     THERAPEUTIC EXERCISES     ULTRASOUND THERAPY        Primary Care Provider Fax #    Physician No Ref-Primary [phone]       No address on file        Equal Access to Services     ZULEIMA HANSEN : Hadii aad ku hadasho Soomaali, waaxda luqadaha, qaybta kaalmada adeegyada, waxay yuval waters . So [facility] [phone].    ATENCIÓN: Si mickla ellen, tiene a otero disposición servicios gratuitos de asistencia lingüística. Llame al [phone].    We comply with applicable federal civil rights laws and [place] laws. We do not discriminate on the basis of race, color, national origin, age, disability, sex, sexual orientation, " or gender identity.            Thank you!     Thank you for choosing [facility] HAND THERAPY  for your care. Our goal is always to provide you with excellent care. Hearing back from our patients is one way we can continue to improve our services. Please take a few minutes to complete the written survey that you may receive in the mail after your visit with us. Thank you!             Your Updated Medication List - Protect others around you: Learn how to safely use, store and throw away your medicines at www.disposemymeds.org.          This list is accurate as of 3/20/18  6:44 PM.  Always use your most recent med list.                   Brand Name Dispense Instructions for use Diagnosis    ALEVE PO      Take 220 mg by mouth 2 times daily (with meals)        TYLENOL PO      Take 320 mg by mouth

## 2018-03-20 NOTE — PROGRESS NOTES
Hand Therapy  Progress Note  Current Date: 3/20/2018    Initial Visit: 12/8/18  Total Visits: 18 of 18 authorized    Initial Subjective:  Justice Yee is a 52 year old R hand dominant male.  Patient reports symptoms of pain and stiffness/loss of motion of the right hand and wrist which occurred due to fall off ladder, at work.   S:  Subjective changes as noted by patient: still having pain at the sides of the wrist, limits for open jars and gas cap, and at work the light duty tasks are not difficulty, can not work on heavy machines.    Functional changes noted by patient: Improvement in Self Care Tasks (dressing, eating, bathing)  No Change to Work Tasks and Household Chores  Response to previous treatment:  fair  Patient has noted adverse reaction to:   None     2/27/2018  Upper Extremity Functional Index Score:  SCORE:   Column Totals: /80: 47   (A lower score indicates greater disability.)    3/20/2018  Upper Extremity Functional Index Score:  SCORE:   Column Totals: /80: 55   (A lower score indicates greater disability.)      Objective:  Pain Level Report  VAS(0-10) 12/8/2017 2/2/18 2/13/18 2/27/18 3/20/18   At Rest: 4/10 5/10 1-2/10 0/10 0/10   With Use: 5/10 8/10 2-4/10 6/10 pushing up from hands 5/10 doing things     Report of Pain:  Location:  In the ulnar wrist mostly, but still more in the radial wrist  Pain Quality:sore  Frequency: with use and weight bearing  Pain is worst:  Pushing up  from the hand, still pain and prevents from using hand to push up  Exacerbated by:  Weight bearing  Relieved by:  Rest      Wrist JPS ROM / Value   (Joint Position Sense)  3/20/2018  Cast off 3/20/2018       Affected wrist right Absolute value  Absolute Value   Trial 1 18 -2     Trial 2 10 -10     Mean / Average  -6           Radial Wrist: Provocative Tests  Radial Wrist Zone: Provocative Tests: 1/16/2018 2/13/18 2/27/18 3/20/18   Pain Report:  - none    + mild    ++ moderate    +++ severe  Right right right right    Finkelstein's Test ++ + ++ +   Dequervain's: APL test + + + +   EPB test:  + + - +   Intersection: APL/EPB & ECRB/L test - - -    ECU stress test:  ++ + - -   ECU: supination overpressure + + + +   Ulnar wrist/TFCC     + for TFCC grind/appoximation   Ulnar fovea area    Pain +       ROM:  Pain Report:  - none    + mild    ++ moderate    +++ severe   Wrist  12/8/17 12/13/17 12/13/17 1/2/18 2/13/18 2/27/18 3/20/18   AROM (PROM) R L R  Right  Right right   Extension 15 65 35 40 50 54 58   Flexion 20 62 32 41 45 50 52   RD 0 18 18 12 10 20 + at Radial wrist 17+ at radial wrist   UD 0 32 15 23 35 30+ at Ulnar wrist 32+ at ulnar wrist   Supination 45 85 75 74 70     Pronation 90 90 90  90       ROM:  Thumb 2018 2018 2/27/18 3/20/18   AROM(PROM) Right Left right right   MP /60 /70 x/60 /62   IP /45 /70 /52 /52   RAbd 54+ 60 58 58   PAbd 54+ 55 55+ 55+   Retropulsion       Kapandji Opposition Scale (0-10/10)   910 9.5     7   Fingers  Extension/Flexion, AROM(PROM)  Date:   12/13 1/2/18 1/8 12/27/18 3/20/18   Side:  Right       AROM(PROM)          Index:  MP               PIP               DIP  FERRER:  75  95  50 -14/72  0/84  0/35  177 0/82  0/92  0/40 0/82  0/105  0/45  232     Long:   MP               PIP               DIP  FERRER:  71  97  61 0/80  0/95  0/50  225  0/85  0/110  0/65  260 /87  /107  /68  262    Ring:    MP                PIP                DIP  FERRER:  70  97  55 0/77  0/97  0/55  229  0/82  0/105  0/65  212     Small:   MP                PIP                DIP  FERRER:  75  95  58 -7/80  -4/94  0/58  221  0/85  0/100  0/70  255    Thumb:  MP                IP                RABD                PABD  25/61  +/30  45  55 -19/55  +/40  51  53          STRENGTH:   (Measured in pounds)     2018  3/20/18    Trials Right Left Right Left   1  2  3  Av 81 47  50  56  52 85       3 pt Pinch 2018  3/20/18    Trials Right Left Right Left   1  2  3  Avg: 10+ at radial wrist 18 14      Lateral  Pinch 2018  3/20/18    Trials Right Left Right Left   1  2  3  Av 19 17          Assessment:  Response to therapy has been improvement to:  ROM of Wrist:  Ext  and Flex  Strength:  elbow strength  Self Care Skills:   strength, mild change  Response to therapy has been lack of progress in:  Strength:   and pinch, wrist strength and pt has not progressed as expected. He continues to have swelling at the ulnar wrist, appears to be in the TFCC region as well as the ECU tendon, and the thumb/wrist 1st Dorsal compartment tenosynovitis has not resolved. Wrist proprioception tests note some lack of wrist position awareness.   Pain:  Pain is not resolving as expected    Overall Assessment:  Patient is not progressing as expected.  Patient would benefit from continued therapy to achieve rehab potential  STG/LTG:  STGoals have been reviewed and progress or achievement has occurred;  see goal sheet for details and updates.  I have re-evaluated this patient and find that the nature, scope, duration and intensity of the therapy is appropriate for the medical condition of the patient.    P:  Frequency/Duration:  Recommend continuing to see patient  1 X week, once daily  for 6 weeks    Recommendations for Continued Therapy  Treatment Plan:    Additions to Treatment Plan -  Modalities:  US  Therapeutic Exercise:  Tendon Gliding, Isotonics, Isometrics and Stabilization  Neuromuscular re-education:  Stabilization  Self Care:  Ergonomic Considerations  Continue thumb spica orthosis for support         Home Program:   Pen roll  Icing to ECU and avoid painful motions.   Continue wearing thumb spica for Dequervains as needed, and ECU tendonitis  Continue thumb and Wrist ROM exercises  DTM AROM and wall ball wt bearing, on fist/no pain provocation  DTM isometrics no pain, keep wrist is neutral position    Next Visit:  Progress strengthening after tendonitis resolves, focus on wrist stability progressing slowly

## 2018-03-27 ENCOUNTER — THERAPY VISIT (OUTPATIENT)
Dept: OCCUPATIONAL THERAPY | Facility: CLINIC | Age: 53
End: 2018-03-27
Payer: OTHER MISCELLANEOUS

## 2018-03-27 DIAGNOSIS — M25.641 STIFFNESS OF FINGER JOINT, RIGHT: ICD-10-CM

## 2018-03-27 DIAGNOSIS — M25.631 WRIST STIFFNESS, RIGHT: ICD-10-CM

## 2018-03-27 DIAGNOSIS — M65.4 RADIAL STYLOID TENOSYNOVITIS: ICD-10-CM

## 2018-03-27 DIAGNOSIS — S52.571D OTHER INTRAARTICULAR FRACTURE OF LOWER END OF RIGHT RADIUS, SUBSEQUENT ENCOUNTER FOR CLOSED FRACTURE WITH ROUTINE HEALING: ICD-10-CM

## 2018-03-27 DIAGNOSIS — M25.531 RIGHT WRIST PAIN: Primary | ICD-10-CM

## 2018-03-27 PROCEDURE — 97140 MANUAL THERAPY 1/> REGIONS: CPT | Mod: GO | Performed by: OCCUPATIONAL THERAPIST

## 2018-03-27 PROCEDURE — 97035 APP MDLTY 1+ULTRASOUND EA 15: CPT | Mod: GO | Performed by: OCCUPATIONAL THERAPIST

## 2018-03-27 PROCEDURE — 97110 THERAPEUTIC EXERCISES: CPT | Mod: GO | Performed by: OCCUPATIONAL THERAPIST

## 2018-03-27 NOTE — MR AVS SNAPSHOT
After Visit Summary   3/27/2018    Justice Yee    MRN: [id number]           Patient Information     Date Of Birth          1965        Visit Information        Provider Department      3/27/2018 2:00 PM Delilah Rahman OT [facility]        Today's Diagnoses     Right wrist pain    -  1    Radial styloid tenosynovitis        Other intraarticular fracture of lower end of right radius, subsequent encounter for closed fracture with routine healing        Wrist stiffness, right        Stiffness of finger joint, right           Follow-ups after your visit        Your next 10 appointments already scheduled     Mar 29, 2018  3:15 PM CDT   (Arrive by 3:00 PM)   RETURN HAND with Carlitos Woodard MD   [facility] ([facility])    [address] [place] [address] [phone]            Apr 03, 2018  4:00 PM CDT   SHALOM Hand with Delilah Rahman OT   [facility] ([facility])    [address] [place] [address] [phone]              Who to contact     If you have questions or need follow up information about today's clinic visit or your schedule please contact [facility] HAND THERAPY directly at [phone].  Normal or non-critical lab and imaging results will be communicated to you by MyChart, letter or phone within 4 business days after the clinic has received the results. If you do not hear from us within 7 days, please contact the clinic through Avatar Realityhart or phone. If you have a critical or abnormal lab result, we will notify you by phone as soon as possible.  Submit refill requests through etouches or call your pharmacy and they will forward the refill request to us. Please allow 3 business days for your refill to be completed.          Additional Information About Your Visit        etouches Information     etouches lets you send messages to your doctor, view  "your test results, renew your prescriptions, schedule appointments and more. To sign up, go to [email or web address] . Click on \"Log in\" on the left side of the screen, which will take you to the Welcome page. Then click on \"Sign up Now\" on the right side of the page.     You will be asked to enter the access code listed below, as well as some personal information. Please follow the directions to create your username and password.     Your access code is: [email or web address]  Expires: 2018  7:30 AM     Your access code will  in 90 days. If you need help or a new code, please call your [place] clinic or [phone].        Care EveryWhere ID     This is your Care EveryWhere ID. This could be used by other organizations to access your [place] medical records  [id number]         Blood Pressure from Last 3 Encounters:   17 128/83   17 156/85    Weight from Last 3 Encounters:   17 96.2 kg (212 lb)   17 96.2 kg (212 lb)   17 97.1 kg (214 lb)              We Performed the Following     MANUAL THER TECH,1+REGIONS,EA 15 MIN     THERAPEUTIC EXERCISES     ULTRASOUND THERAPY        Primary Care Provider Fax #    Physician No Ref-Primary [phone]       No address on file        Equal Access to Services     DANIEL HANSEN : Hadii orlando ku hadasho Soomaali, waaxda luqadaha, qaybta kaalmada virgilio, sandra waters . So [facility] [phone].    ATENCIÓN: Si habla español, tiene a otero disposición servicios gratuitos de asistencia lingüística. Siobhan al [phone].    We comply with applicable federal civil rights laws and [place] laws. We do not discriminate on the basis of race, color, national origin, age, disability, sex, sexual orientation, or gender identity.            Thank you!     Thank you for choosing [facility] HAND THERAP[facility]  for your care. Our goal is always to provide you with excellent care. Hearing back from our patients is one way we can continue to " improve our services. Please take a few minutes to complete the written survey that you may receive in the mail after your visit with us. Thank you!             Your Updated Medication List - Protect others around you: Learn how to safely use, store and throw away your medicines at www.disposemymeds.org.          This list is accurate as of 3/27/18  2:44 PM.  Always use your most recent med list.                   Brand Name Dispense Instructions for use Diagnosis    ALEVE PO      Take 220 mg by mouth 2 times daily (with meals)        TYLENOL PO      Take 320 mg by mouth

## 2018-03-29 ENCOUNTER — OFFICE VISIT (OUTPATIENT)
Dept: ORTHOPEDICS | Facility: CLINIC | Age: 53
End: 2018-03-29
Payer: OTHER MISCELLANEOUS

## 2018-03-29 DIAGNOSIS — S52.571D OTHER CLOSED INTRA-ARTICULAR FRACTURE OF DISTAL END OF RIGHT RADIUS WITH ROUTINE HEALING, SUBSEQUENT ENCOUNTER: Primary | ICD-10-CM

## 2018-03-29 RX ORDER — MELOXICAM 7.5 MG/1
7.5 TABLET ORAL DAILY
Qty: 60 TABLET | Refills: 1 | Status: SHIPPED | OUTPATIENT
Start: 2018-03-29 | End: 2018-03-29

## 2018-03-29 RX ORDER — MELOXICAM 7.5 MG/1
7.5 TABLET ORAL DAILY
Qty: 60 TABLET | Refills: 1 | Status: SHIPPED | OUTPATIENT
Start: 2018-03-29 | End: 2018-05-11

## 2018-03-29 NOTE — LETTER
Return to Work  2018     Seen today: yes    Patient:  Justice Yee  :   1965  MRN:     [id number]  Physician: CARLITOS SOARES may return to work on Date: 3/30/2018.      The next clinic appointment is scheduled for 4:00pm 5/10/2018.    Patient limitations:  Must be able to keep right hand elevated above heart level as needed for swelling. Must be given frequent breaks for rest and hand therapy exercises. Weight bearing as tolerated through right hand. No lifting greater than 10 lbs with right hand. No repetitive or strenuous activities with right hand.            Electronically signed by Carlitos Soares MD

## 2018-03-29 NOTE — PROGRESS NOTES
Patient returns for follow-up after open reduction internal fixation of right distal radius fracture.    INTERVAL HISTORY: Reports he continues to have ulnar-sided wrist pain and difficulty with bearing weight through the injured wrist. Also reports dullness to light touch in the distribution of the proximal palm.    PHYSICAL EXAMINATION:  Gen.: No acute distress.  On exam of the right wrist, there is tenderness on palpation of the distal ulnar aspect of the ulna. There is no snapping or subluxation of the extensor carpi ulnaris tendon either with wrist supination nor pronation. There is no tenderness on palpation of the ulnar fovea. DRUJ is stable on piano cano maneuver. Finkelstein maneuver is positive with pain. Able to make a composite fist, but IF extensor mechanism is tight. Able to extend all digits. Sensation to light touch is dull over the proximal palm. Mild swelling globally.    ASSESSMENT: 4.5 months status post open reduction internal fixation of right distal radius intra-articular fracture. 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    PLAN: I recommended discontinuing Aleve and starting a trial of Mobic for the tendinitis issues. Prescription was given. As for the dulled sensation over the proximal palm, I will monitor this for now given that there has been sensation there in the past. Patient is to continue with hand therapy. New work letter was given. Patient will return to see me in 6 weeks. If there is not significant improvement in exam, I will consider obtaining a postop XR.    Total time spent with patient was 15 min of which greater than 50% was in counseling.

## 2018-03-29 NOTE — MR AVS SNAPSHOT
After Visit Summary   3/29/2018    Justice Yee    MRN: [id number]           Patient Information     Date Of Birth          1965        Visit Information        Provider Department      3/29/2018 3:15 PM Carlitos Woodard MD [facility]        Today's Diagnoses     Other closed intra-articular fracture of distal end of right radius with routine healing, subsequent encounter    -  1       Follow-ups after your visit        Additional Services     HAND THERAPY       Hand Therapy Referral for R wrist and hand AROM and strengthening.                  Follow-up notes from your care team     Return in about 6 weeks (around 5/10/2018).      Your next 10 appointments already scheduled     Apr 03, 2018  4:00 PM CDT   SHALOM Hand with Delilah Rahman OT   [facility] ([facility])    [address] [place] [address] [phone]            May 10, 2018  4:00 PM CDT   (Arrive by 3:45 PM)   RETURN HAND with Carlitos Woodard MD   [facility] ([facility])    [address] [place] [address] [phone]              Who to contact     Please call your clinic at [phone] to:    Ask questions about your health    Make or cancel appointments    Discuss your medicines    Learn about your test results    Speak to your doctor            Additional Information About Your Visit        MyChart Information     [employer] is an electronic gateway that provides easy, online access to your medical records. With [employer], you can request a clinic appointment, read your test results, renew a prescription or communicate with your care team.     To sign up for Brandarkt visit the website at [email or web address]   You will be asked to enter the access code listed below, as well as some personal information. Please follow the directions to create your username and password.     Your  access code is: [email or web address]  Expires: 2018  7:30 AM     Your access code will  in 90 days. If you need help or a new code, please contact your [facility] or call [phone] for assistance.        Care EveryWhere ID     This is your Care EveryWhere ID. This could be used by other organizations to access your [employer] medical records  [id number]         Blood Pressure from Last 3 Encounters:   17 128/83   17 156/85    Weight from Last 3 Encounters:   17 212 lb   17 212 lb   17 214 lb              We Performed the Following     HAND THERAPY          Today's Medication Changes          These changes are accurate as of 3/29/18 11:59 PM.  If you have any questions, ask your nurse or doctor.               Start taking these medicines.        Dose/Directions    meloxicam 7.5 MG tablet   Commonly known as:  MOBIC   Used for:  Other closed intra-articular fracture of distal end of right radius with routine healing, subsequent encounter   Started by:  Carlitos Woodard MD        Dose:  7.5 mg   Take 1 tablet (7.5 mg) by mouth daily   Quantity:  60 tablet   Refills:  1         Stop taking these medicines if you haven't already. Please contact your care team if you have questions.     ALEVE PO   Stopped by:  Carlitos Woodard MD                Where to get your medicines      Some of these will need a paper prescription and others can be bought over the counter.  Ask your nurse if you have questions.     Bring a paper prescription for each of these medications     meloxicam 7.5 MG tablet                Primary Care Provider Fax #    Physician No Ref-Primary [phone]       No address on file        Equal Access to Services     [facility]: Zaid sanchez Sorubén, waaxda luqadaha, qaybta kaalmada sandra poole . So [facility] [phone].    ATENCIÓN: Si habla español, tiene a otero disposición servicios gratuitos de  asistencia lingüística. Siobhan al [phone].    We comply with applicable federal civil rights laws and [place] laws. We do not discriminate on the basis of race, color, national origin, age, disability, sex, sexual orientation, or gender identity.            Thank you!     Thank you for choosing [facility]  for your care. Our goal is always to provide you with excellent care. Hearing back from our patients is one way we can continue to improve our services. Please take a few minutes to complete the written survey that you may receive in the mail after your visit with us. Thank you!             Your Updated Medication List - Protect others around you: Learn how to safely use, store and throw away your medicines at www.disposemymeds.org.          This list is accurate as of 3/29/18 11:59 PM.  Always use your most recent med list.                   Brand Name Dispense Instructions for use Diagnosis    meloxicam 7.5 MG tablet    MOBIC    60 tablet    Take 1 tablet (7.5 mg) by mouth daily    Other closed intra-articular fracture of distal end of right radius with routine healing, subsequent encounter       TYLENOL PO      Take 320 mg by mouth

## 2018-03-29 NOTE — LETTER
Date:April 2, 2018      Patient was self referred, no letter generated. Do not send.        [facility] Health Information

## 2018-03-29 NOTE — NURSING NOTE
Reason For Visit:   Chief Complaint   Patient presents with     Surgical Followup     4 month follow up ORIF right distal radius fracture.  DOS: 11/14/2017       Primary MD: No Ref-Primary, Physician  Ref. MD: Self    Age: 52 year old    ?  No      There were no vitals taken for this visit.      Pain Assessment  Patient Currently in Pain: Yes  0-10 Pain Scale: 5  Primary Pain Location: Wrist  Pain Orientation: Right  Pain Descriptors: Sharp  Alleviating Factors: Rest  Aggravating Factors: Movement, Other (comment) (lifting a gallon on milk and ulnar and radial deviations )               QuickDASH Assessment  No flowsheet data found.       Current Outpatient Prescriptions   Medication Sig Dispense Refill     Naproxen Sodium (ALEVE PO) Take 220 mg by mouth 2 times daily (with meals)       Acetaminophen (TYLENOL PO) Take 320 mg by mouth         No Known Allergies    Paola Raman ATC

## 2018-03-29 NOTE — LETTER
3/29/2018       RE: Justice Yee  [address]   [facility] [address]     Dear Colleague,    Thank you for referring your patient, Justice Yee, to the [facility] at [facility]. Please see a copy of my visit note below.    Patient returns for follow-up after open reduction internal fixation of right distal radius fracture.    INTERVAL HISTORY: Reports he continues to have ulnar-sided wrist pain and difficulty with bearing weight through the injured wrist. Also reports dullness to light touch in the distribution of the proximal palm.    PHYSICAL EXAMINATION:  Gen.: No acute distress.  On exam of the right wrist, there is tenderness on palpation of the distal ulnar aspect of the ulna. There is no snapping or subluxation of the extensor carpi ulnaris tendon either with wrist supination nor pronation. There is no tenderness on palpation of the ulnar fovea. DRUJ is stable on piano cano maneuver. Finkelstein maneuver is positive with pain. Able to make a composite fist, but IF extensor mechanism is tight. Able to extend all digits. Sensation to light touch is dull over the proximal palm. Mild swelling globally.    ASSESSMENT: 4.5 months status post open reduction internal fixation of right distal radius intra-articular fracture. 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    PLAN: I recommended discontinuing Aleve and starting a trial of Mobic for the tendinitis issues. Prescription was given. As for the dulled sensation over the proximal palm, I will monitor this for now given that there has been sensation there in the past. Patient is to continue with hand therapy. New work letter was given. Patient will return to see me in 6 weeks. If there is not significant improvement in exam, I will consider obtaining a postop XR.    Total time spent with patient was 15 min of which  greater than 50% was in counseling.    Again, thank you for allowing me to participate in the care of your patient.      Sincerely,    Carlitos Woodard MD

## 2018-04-04 NOTE — LETTER
After Visit Summary   4/4/2018    Terry Yi    MRN: [id number]           Patient Information     Date Of Birth          1962        Visit Information        Provider Department      4/4/2018 12:00 PM NURSE ONLY CANCER CENTER [facility]        Today's Diagnoses     Malignant neoplasm of overlapping sites of stomach (H)    -  1       Follow-ups after your visit        Your next 10 appointments already scheduled     Apr 04, 2018 12:30 PM CDT   Level 2 with BAY 6 INFUSION   [facility] ([facility])    [address] [place] [address] [phone]            Apr 18, 2018  8:30 AM CDT   Return Visit with NURSE ONLY CANCER CENTER   [facility] ([facility])    [address] [place] [id number] [phone]            Apr 18, 2018  9:15 AM CDT   Return Visit with Yakov Shaffer MD   [facility] ([facility])    [address] [place] [id number] [phone]            Apr 18, 2018 10:00 AM CDT   Level 2 with BAY  INFUSION   [facility] ([facility])    [address] [place] [id number] [phone]              Who to contact     If you have questions or need follow up information about today's clinic visit or your schedule please contact [facility] directly at [phone].  Normal or non-critical lab and imaging results will be communicated to you by MyChart, letter or phone within 4 business days after the clinic has received the results. If you do not hear from us within 7 days, please contact the clinic through MyChart or phone. If you have a critical or abnormal lab result, we will notify you by phone as soon as possible.  Submit refill requests through Wishberg or call your pharmacy and they will forward the refill request to us. Please allow 3 business days for  Return to Work  2018     Seen today: yes    Patient:  Justice Yee  :   1965  MRN:     [id number]  Physician: CARLITOS SOARES may return to work on Date: 3/2/2018.      The next clinic appointment is scheduled for (date/time) 3/29/2018.    Patient limitations:  Must be able to keep right hand elevated above heart level as needed for swelling. Must be given frequent breaks for rest and hand therapy exercises. Weight bearing as tolerated through right hand. No lifting greater than 10 lbs with right hand. No repetitive or strenuous activities with right hand.            Electronically signed by Carlitos Soares MD             your refill to be completed.          Additional Information About Your Visit        GrabInboxhart Information     [employer] gives you secure access to your electronic health record. If you see a primary care provider, you can also send messages to your care team and make appointments. If you have questions, please call your primary care clinic.  If you do not have a primary care provider, please call [phone] and they will assist you.      [employer] is an electronic gateway that provides easy, online access to your medical records. With [employer], you can request a clinic appointment, read your test results, renew a prescription or communicate with your care team.     To access your existing account, please contact your [facility] or call [phone] for assistance.        Care EveryWhere ID     This is your Care EveryWhere ID. This could be used by other organizations to access your [place] medical records  [id number]         Blood Pressure from Last 3 Encounters:   03/21/18 112/76   03/07/18 115/76   02/21/18 130/87    Weight from Last 3 Encounters:   03/21/18 99.5 kg (219 lb 6 oz)   03/07/18 99.6 kg (219 lb 9 oz)   02/21/18 98.6 kg (217 lb 6.4 oz)              We Performed the Following     CBC with platelets differential     Comprehensive metabolic panel        Primary Care Provider Office Phone # Fax #    Sandra Dickerson [phone]       [facility]  [address]  [facility] [address]        Equal Access to Services     DONOVAN GALLO : Hadii aad ku hadasho Soomaali, waaxda luqadaha, qaybta kaalmada adeegyada, claire cowart . So [facility] [phone].    ATENCIÓN: Si habla español, tiene a cuevas disposición servicios gratuitos de asistencia lingüística. Llame al [phone].    We comply with applicable federal civil rights laws and [place] laws. We do not discriminate on the basis of race, color, national origin, age, disability, sex,  sexual orientation, or gender identity.            Thank you!     Thank you for choosing [facility]  for your care. Our goal is always to provide you with excellent care. Hearing back from our patients is one way we can continue to improve our services. Please take a few minutes to complete the written survey that you may receive in the mail after your visit with us. Thank you!             Your Updated Medication List - Protect others around you: Learn how to safely use, store and throw away your medicines at www.disposemymeds.org.          This list is accurate as of 4/4/18 12:29 PM.  Always use your most recent med list.                   Brand Name Dispense Instructions for use Diagnosis    allopurinol 100 MG tablet    ZYLOPRIM     as needed (when eating shrimp)        aspirin 81 MG chewable tablet      Take 81 mg by mouth daily        lidocaine-prilocaine cream    EMLA          LORazepam 0.5 MG tablet    ATIVAN    30 tablet    Take 1 tablet (0.5 mg) by mouth every 4 hours as needed (Anxiety, Nausea/Vomiting or Sleep)    Malignant neoplasm of overlapping sites of stomach (H)       MULTIVITAMIN ADULT PO           ondansetron 8 MG tablet    ZOFRAN    10 tablet    Take 1 tablet (8 mg) by mouth every 8 hours as needed (Nausea/Vomiting)    Malignant neoplasm of overlapping sites of stomach (H)       prochlorperazine 10 MG tablet    COMPAZINE    30 tablet    Take 1 tablet (10 mg) by mouth every 6 hours as needed (Nausea/Vomiting)    Malignant neoplasm of overlapping sites of stomach (H)       TYLENOL PO      Take by mouth as needed for mild pain or fever

## 2018-04-09 ENCOUNTER — THERAPY VISIT (OUTPATIENT)
Dept: OCCUPATIONAL THERAPY | Facility: CLINIC | Age: 53
End: 2018-04-09
Payer: OTHER MISCELLANEOUS

## 2018-04-09 DIAGNOSIS — M25.641 STIFFNESS OF FINGER JOINT, RIGHT: ICD-10-CM

## 2018-04-09 DIAGNOSIS — M25.531 RIGHT WRIST PAIN: ICD-10-CM

## 2018-04-09 DIAGNOSIS — M65.4 RADIAL STYLOID TENOSYNOVITIS: ICD-10-CM

## 2018-04-09 DIAGNOSIS — M25.631 WRIST STIFFNESS, RIGHT: Primary | ICD-10-CM

## 2018-04-09 DIAGNOSIS — S52.571D OTHER INTRAARTICULAR FRACTURE OF LOWER END OF RIGHT RADIUS, SUBSEQUENT ENCOUNTER FOR CLOSED FRACTURE WITH ROUTINE HEALING: ICD-10-CM

## 2018-04-09 PROCEDURE — 97110 THERAPEUTIC EXERCISES: CPT | Mod: GO | Performed by: OCCUPATIONAL THERAPIST

## 2018-04-09 PROCEDURE — 97112 NEUROMUSCULAR REEDUCATION: CPT | Mod: GO | Performed by: OCCUPATIONAL THERAPIST

## 2018-04-09 PROCEDURE — 97140 MANUAL THERAPY 1/> REGIONS: CPT | Mod: GO | Performed by: OCCUPATIONAL THERAPIST

## 2018-04-09 NOTE — PROGRESS NOTES
SOAP note objective information for 4/9/2018.  Please refer to the daily flowsheet for treatment today, total treatment time and time spent performing 1:1 timed codes.        Objective:  Pain Level Report  VAS(0-10) 12/8/2017 2/2/18 2/13/18 2/27/18 3/20/18   At Rest: 4/10 5/10 1-2/10 0/10 0/10   With Use: 5/10 8/10 2-4/10 6/10 pushing up from hands 5/10 doing things     Report of Pain:  Location:  In the ulnar wrist mostly, but still more in the radial wrist  Pain Quality:sore  Frequency: with use and weight bearing  Pain is worst:  Pushing up  from the hand, still pain and prevents from using hand to push up  Exacerbated by:  Weight bearing  Relieved by:  Rest      Wrist JPS ROM / Value   (Joint Position Sense)  3/20/2018  Cast off 3/20/2018       Affected wrist right Absolute value  Absolute Value   Trial 1 18 -2     Trial 2 10 -10     Mean / Average  -6           Radial Wrist: Provocative Tests  Radial Wrist Zone: Provocative Tests: 1/16/2018 2/13/18 2/27/18 3/20/18   Pain Report:  - none    + mild    ++ moderate    +++ severe  Right right right right   Finkelstein's Test ++ + ++ +   Dequervain's: APL test + + + +   EPB test:  + + - +   Intersection: APL/EPB & ECRB/L test - - -    ECU stress test:  ++ + - -   ECU: supination overpressure + + + +   Ulnar wrist/TFCC     + for TFCC grind/appoximation   Ulnar fovea area    Pain +       ROM:  Pain Report:  - none    + mild    ++ moderate    +++ severe   Wrist  12/8/17 12/13/17 12/13/17 1/2/18 2/13/18 2/27/18 3/20/18 4/9/18   AROM (PROM) R L R  Right  Right right    Extension 15 65 35 40 50 54 58 55++ end range   Flexion 20 62 32 41 45 50 52 50   RD 0 18 18 12 10 20 + at Radial wrist 17+ at radial wrist    UD 0 32 15 23 35 30+ at Ulnar wrist 32+ at ulnar wrist    Supination 45 85 75 74 70      Pronation 90 90 90  90        ROM:  Thumb 2/13/2018 2/13/2018 2/27/18 3/20/18   AROM(PROM) Right Left right right   MP /60 /70 x/60 /62   IP /45 /70 /52 /52   RAbd 54+ 60 58 58    PAbd 54+ 55 55+ 55+   Retropulsion       Kapandji Opposition Scale (0-10/10)   9/10 9.5     7   Fingers  Extension/Flexion, AROM(PROM)  Date:   12/13 1/2/18 1/8 12/27/18 3/20/18   Side:  Right       AROM(PROM)          Index:  MP               PIP               DIP  FERRER:  75  95  50 -14/72  0/84  0/35  177 0/82  0/92  0/40 0/82  0/105  0/45  232     Long:   MP               PIP               DIP  FERRER:  71  97  61 0/80  0/95  0/50  225  0/85  0/110  0/65  260 /87  /107  /68  262    Ring:    MP                PIP                DIP  FERRER:  70  97  55 0/77  0/97  0/55  229  0/82  0/105  0/65  212     Small:   MP                PIP                DIP  FERRER:  75  95  58 -7/80  -4/94  0/58  221  0/85  0/100  0/70  255    Thumb:  MP                IP                RABD                PABD  25/61  +/30  45  55 -19/55  +/40  51  53          STRENGTH:   (Measured in pounds)     2018  3/20/18    Trials Right Left Right Left   1  2  3  Av 81 47  50  56  52 85       3 pt Pinch 2018  3/20/18    Trials Right Left Right Left   1  2  3  Avg: 10+ at radial wrist 18 14      Lateral Pinch 2018  3/20/18    Trials Right Left Right Left   1  2  3  Av 19 17        P:  Frequency/Duration:  Recommend continuing to see patient  1 X week, once daily  for 6 weeks    Recommendations for Continued Therapy  Treatment Plan:    Additions to Treatment Plan -  Modalities:  US  Therapeutic Exercise:  Tendon Gliding, Isotonics, Isometrics and Stabilization  Neuromuscular re-education:  Stabilization  Self Care:  Ergonomic Considerations  Continue thumb spica orthosis for support         Home Program:   Pen roll  Icing to ECU and avoid painful motions.   Continue wearing thumb spica for Dequervains as needed, and ECU tendonitis  Continue thumb and Wrist ROM exercises  DTM AROM and wall ball wt bearing, on fist/no pain provocation  DTM isometrics no pain, keep wrist is neutral position    Next Visit:  Progress strengthening  after tendonitis resolves, focus on wrist stability progressing slowly

## 2018-04-09 NOTE — MR AVS SNAPSHOT
After Visit Summary   4/9/2018    Justice Yee    MRN: [id number]           Patient Information     Date Of Birth          1965        Visit Information        Provider Department      4/9/2018 11:30 AM Delilah Rahman OT [facility]        Today's Diagnoses     Wrist stiffness, right    -  1    Radial styloid tenosynovitis        Right wrist pain        Other intraarticular fracture of lower end of right radius, subsequent encounter for closed fracture with routine healing        Stiffness of finger joint, right           Follow-ups after your visit        Your next 10 appointments already scheduled     Apr 16, 2018  4:00 PM CDT   SHALOM Hand with Delilah Rahman OT   [facility] ([facility] [place])    [address] [place] [address] [phone]            Apr 23, 2018  5:00 PM CDT   SHALOM Hand with Delilah Rahman OT   [facility] ([facility] [place])    [address] [place] [address] [phone]            May 10, 2018  4:00 PM CDT   (Arrive by 3:45 PM)   RETURN HAND with Carlitos Woodard MD   [facility] ([facility] [place])    [address] [place] [address] [phone]              Who to contact     If you have questions or need follow up information about today's clinic visit or your schedule please contact [facility] directly at [phone].  Normal or non-critical lab and imaging results will be communicated to you by MyChart, letter or phone within 4 business days after the clinic has received the results. If you do not hear from us within 7 days, please contact the clinic through MyChart or phone. If you have a critical or abnormal lab result, we will notify you by phone as soon as possible.  Submit refill requests through [employer] or call your pharmacy and they will forward the refill  "request to us. Please allow 3 business days for your refill to be completed.          Additional Information About Your Visit        MyChart Information     [employer] lets you send messages to your doctor, view your test results, renew your prescriptions, schedule appointments and more. To sign up, go to [email or web address] . Click on \"Log in\" on the left side of the screen, which will take you to the Welcome page. Then click on \"Sign up Now\" on the right side of the page.     You will be asked to enter the access code listed below, as well as some personal information. Please follow the directions to create your username and password.     Your access code is: [email or web address]  Expires: 2018  7:30 AM     Your access code will  in 90 days. If you need help or a new code, please call your [place] clinic or [phone].        Care EveryWhere ID     This is your Care EveryWhere ID. This could be used by other organizations to access your [place] medical records  [id number]         Blood Pressure from Last 3 Encounters:   17 128/83   17 156/85    Weight from Last 3 Encounters:   17 96.2 kg (212 lb)   17 96.2 kg (212 lb)   17 97.1 kg (214 lb)              We Performed the Following     MANUAL THER TECH,1+REGIONS,EA 15 MIN     NEUROMUSCULAR RE-EDUCATION     THERAPEUTIC EXERCISES        Primary Care Provider Fax #    Physician No Ref-Primary [phone]       No address on file        Equal Access to Services     DANIEL HANSEN : Hadii aad ku hadasho Soomaali, waaxda luqadaha, qaybta kaalmada adeegyada, waxay yuval waters . So [facility] [phone].    ATENCIÓN: Si habla español, tiene a otero disposición servicios gratuitos de asistencia lingüística. Llame al [phone].    We comply with applicable federal civil rights laws and [place] laws. We do not discriminate on the basis of race, color, national origin, age, disability, sex, sexual orientation, or gender " identity.            Thank you!     Thank you for choosing [facility] HAND [facility]ER[facility]  for your care. Our goal is always to provide you with excellent care. Hearing back from our patients is one way we can continue to improve our services. Please take a few minutes to complete the written survey that you may receive in the mail after your visit with us. Thank you!             Your Updated Medication List - Protect others around you: Learn how to safely use, store and throw away your medicines at www.disposemymeds.org.          This list is accurate as of 4/9/18 12:19 PM.  Always use your most recent med list.                   Brand Name Dispense Instructions for use Diagnosis    meloxicam 7.5 MG tablet    MOBIC    60 tablet    Take 1 tablet (7.5 mg) by mouth daily    Other closed intra-articular fracture of distal end of right radius with routine healing, subsequent encounter       TYLENOL PO      Take 320 mg by mouth

## 2018-04-16 ENCOUNTER — THERAPY VISIT (OUTPATIENT)
Dept: OCCUPATIONAL THERAPY | Facility: CLINIC | Age: 53
End: 2018-04-16
Payer: OTHER MISCELLANEOUS

## 2018-04-16 DIAGNOSIS — S52.571D OTHER INTRAARTICULAR FRACTURE OF LOWER END OF RIGHT RADIUS, SUBSEQUENT ENCOUNTER FOR CLOSED FRACTURE WITH ROUTINE HEALING: ICD-10-CM

## 2018-04-16 DIAGNOSIS — M25.641 STIFFNESS OF FINGER JOINT, RIGHT: ICD-10-CM

## 2018-04-16 DIAGNOSIS — M25.631 WRIST STIFFNESS, RIGHT: Primary | ICD-10-CM

## 2018-04-16 DIAGNOSIS — M65.4 RADIAL STYLOID TENOSYNOVITIS: ICD-10-CM

## 2018-04-16 DIAGNOSIS — M25.531 RIGHT WRIST PAIN: ICD-10-CM

## 2018-04-16 PROCEDURE — 97140 MANUAL THERAPY 1/> REGIONS: CPT | Mod: GO | Performed by: OCCUPATIONAL THERAPIST

## 2018-04-16 PROCEDURE — 97110 THERAPEUTIC EXERCISES: CPT | Mod: GO | Performed by: OCCUPATIONAL THERAPIST

## 2018-04-16 NOTE — PROGRESS NOTES
SOAP note objective information for 4/16/2018.  Please refer to the daily flowsheet for treatment today, total treatment time and time spent performing 1:1 timed codes.        Objective:  Pain Level Report  VAS(0-10) 12/8/2017 2/2/18 2/13/18 2/27/18 3/20/18    At Rest: 4/10 5/10 1-2/10 0/10 0/10    With Use: 5/10 8/10 2-4/10 6/10 pushing up from hands 5/10 doing things      Report of Pain:  Location:  In the ulnar wrist mostly, but still more in the radial wrist  Pain Quality:sore  Frequency: with use and weight bearing  Pain is worst:  Pushing up  from the hand, still pain and prevents from using hand to push up  Exacerbated by:  Weight bearing  Relieved by:  Rest      Wrist JPS ROM / Value   (Joint Position Sense)  3/20/2018  Cast off 3/20/2018       Affected wrist right Absolute value  Absolute Value   Trial 1 18 -2     Trial 2 10 -10     Mean / Average  -6           Radial Wrist: Provocative Tests  Radial Wrist Zone: Provocative Tests: 1/16/2018 2/13/18 2/27/18 3/20/18   Pain Report:  - none    + mild    ++ moderate    +++ severe  Right right right right   Finkelstein's Test ++ + ++ +   Dequervain's: APL test + + + +   EPB test:  + + - +   Intersection: APL/EPB & ECRB/L test - - -    ECU stress test:  ++ + - -   ECU: supination overpressure + + + +   Ulnar wrist/TFCC     + for TFCC grind/appoximation   Ulnar fovea area    Pain +       ROM:  Pain Report:  - none    + mild    ++ moderate    +++ severe   Wrist  12/8/17 12/13/17 12/13/17 1/2/18 2/13/18 2/27/18 3/20/18 4/9/18 4/16   AROM (PROM) R L R  Right  Right right     Extension 15 65 35 40 50 54 58 55++ end range 56 tight no pain  (60)     Flexion 20 62 32 41 45 50 52 50 50 (60)   RD 0 18 18 12 10 20 + at Radial wrist 17+ at radial wrist     UD 0 32 15 23 35 30+ at Ulnar wrist 32+ at ulnar wrist     Supination 45 85 75 74 70       Pronation 90 90 90  90         ROM:  Thumb 2/13/2018 2/13/2018 2/27/18 3/20/18   AROM(PROM) Right Left right right   MP /60 /70 x/60  /62   IP /45 /70 /52 /52   RAbd 54+ 60 58 58   PAbd 54+ 55 55+ 55+   Retropulsion       Kapandji Opposition Scale (0-10/10)   9/10 9.5     7   Fingers  Extension/Flexion, AROM(PROM)  Date:   12/13 1/2/18 1/8 12/27/18 3/20/18   Side:  Right       AROM(PROM)          Index:  MP               PIP               DIP  FERRER:  75  95  50 -14/72  0/84  0/35  177 0/82  0/92  0/40 0/82  0/105  0/45  232     Long:   MP               PIP               DIP  FERRER:  71  97  61 0/80  0/95  0/50  225  0/85  0/110  0/65  260 /87  /107  /68  262    Ring:    MP                PIP                DIP  FERRER:  70  97  55 0/77  0/97  0/55  229  0/82  0/105  0/65  212     Small:   MP                PIP                DIP  FERRER:  75  95  58 -7/80  -4/94  0/58  221  0/85  0/100  0/70  255    Thumb:  MP                IP                RABD                PABD  25/61  +/30  45  55 -19/55  +/40  51  53          STRENGTH:   (Measured in pounds)     2018  3/20/18    Trials Right Left Right Left   1  2  3  Av 81 47  50  56  52 85       3 pt Pinch 2018  3/20/18    Trials Right Left Right Left   1  2  3  Avg: 10+ at radial wrist 18 14      Lateral Pinch 2018  3/20/18    Trials Right Left Right Left   1  2  3  Av 19 17        P:  Frequency/Duration:  Recommend continuing to see patient  1 X week, once daily  for 6 weeks    Recommendations for Continued Therapy  Treatment Plan:    Additions to Treatment Plan -  Modalities:  US  Therapeutic Exercise:  Tendon Gliding, Isotonics, Isometrics and Stabilization  Neuromuscular re-education:  Stabilization  Self Care:  Ergonomic Considerations  Continue thumb spica orthosis for support         Home Program:   Pen roll  Icing to ECU and avoid painful motions.   Continue wearing thumb spica for Dequervains as needed, and ECU tendonitis  Continue thumb and Wrist ROM exercises  DTM AROM and wall ball wt bearing, on fist/no pain provocation  DTM isometrics no pain, keep wrist is neutral  position    Next Visit:  Progress strengthening after tendonitis resolves, focus on wrist stability progressing slowly

## 2018-04-16 NOTE — MR AVS SNAPSHOT
After Visit Summary   4/16/2018    Justice Yee    MRN: [id number]           Patient Information     Date Of Birth          1965        Visit Information        Provider Department      4/16/2018 4:00 PM Delilah Rahman OT [facility]        Today's Diagnoses     Wrist stiffness, right    -  1    Radial styloid tenosynovitis        Right wrist pain        Other intraarticular fracture of lower end of right radius, subsequent encounter for closed fracture with routine healing        Stiffness of finger joint, right           Follow-ups after your visit        Your next 10 appointments already scheduled     Apr 23, 2018  5:00 PM CDT   SHALOM Hand with Delilah Rahman OT   [facility] ([facility] [place])    [address] [place] [address] [phone]            May 03, 2018  4:00 PM CDT   SHALOM Hand with Delilah Rahman OT   [facility] ([facility] [place])    [address] [place] [address] [phone]            May 10, 2018  4:00 PM CDT   (Arrive by 3:45 PM)   RETURN HAND with Carlitos Woodard MD   [facility] ([facility] [place])    [address] [place] [address] [phone]              Who to contact     If you have questions or need follow up information about today's clinic visit or your schedule please contact [facility] directly at [phone].  Normal or non-critical lab and imaging results will be communicated to you by MyChart, letter or phone within 4 business days after the clinic has received the results. If you do not hear from us within 7 days, please contact the clinic through MyChart or phone. If you have a critical or abnormal lab result, we will notify you by phone as soon as possible.  Submit refill requests through [employer] or call your pharmacy and they will forward the refill  "request to us. Please allow 3 business days for your refill to be completed.          Additional Information About Your Visit        MyChart Information     [employer] lets you send messages to your doctor, view your test results, renew your prescriptions, schedule appointments and more. To sign up, go to [email or web address] . Click on \"Log in\" on the left side of the screen, which will take you to the Welcome page. Then click on \"Sign up Now\" on the right side of the page.     You will be asked to enter the access code listed below, as well as some personal information. Please follow the directions to create your username and password.     Your access code is: [email or web address]  Expires: 2018  7:30 AM     Your access code will  in 90 days. If you need help or a new code, please call your [place] clinic or [phone].        Care EveryWhere ID     This is your Care EveryWhere ID. This could be used by other organizations to access your [place] medical records  [id number]         Blood Pressure from Last 3 Encounters:   17 128/83   17 156/85    Weight from Last 3 Encounters:   17 96.2 kg (212 lb)   17 96.2 kg (212 lb)   17 97.1 kg (214 lb)              We Performed the Following     MANUAL THER TECH,1+REGIONS,EA 15 MIN     THERAPEUTIC EXERCISES        Primary Care Provider Fax #    Physician No Ref-Primary [phone]       No address on file        Equal Access to Services     DANIEL HANSEN : Hadii aad ku hadasho Soomaali, waaxda luqadaha, qaybta kaalmada adeegyada, sandra waters . So [facility] [phone].    ATENCIÓN: Si habla español, tiene a otero disposición servicios gratuitos de asistencia lingüística. Llame al [phone].    We comply with applicable federal civil rights laws and [place] laws. We do not discriminate on the basis of race, color, national origin, age, disability, sex, sexual orientation, or gender identity.            Thank you! "     Thank you for choosing [facility]ER[facility]  for your care. Our goal is always to provide you with excellent care. Hearing back from our patients is one way we can continue to improve our services. Please take a few minutes to complete the written survey that you may receive in the mail after your visit with us. Thank you!             Your Updated Medication List - Protect others around you: Learn how to safely use, store and throw away your medicines at www.disposemymeds.org.          This list is accurate as of 4/16/18  4:33 PM.  Always use your most recent med list.                   Brand Name Dispense Instructions for use Diagnosis    meloxicam 7.5 MG tablet    MOBIC    60 tablet    Take 1 tablet (7.5 mg) by mouth daily    Other closed intra-articular fracture of distal end of right radius with routine healing, subsequent encounter       TYLENOL PO      Take 320 mg by mouth

## 2018-04-27 ENCOUNTER — TRANSFERRED RECORDS (OUTPATIENT)
Dept: HEALTH INFORMATION MANAGEMENT | Facility: CLINIC | Age: 53
End: 2018-04-27

## 2018-05-03 ENCOUNTER — THERAPY VISIT (OUTPATIENT)
Dept: OCCUPATIONAL THERAPY | Facility: CLINIC | Age: 53
End: 2018-05-03
Payer: OTHER MISCELLANEOUS

## 2018-05-03 DIAGNOSIS — M65.4 RADIAL STYLOID TENOSYNOVITIS: ICD-10-CM

## 2018-05-03 DIAGNOSIS — M25.631 WRIST STIFFNESS, RIGHT: ICD-10-CM

## 2018-05-03 DIAGNOSIS — M25.531 RIGHT WRIST PAIN: Primary | ICD-10-CM

## 2018-05-03 DIAGNOSIS — M25.641 STIFFNESS OF FINGER JOINT, RIGHT: ICD-10-CM

## 2018-05-03 DIAGNOSIS — S52.571D OTHER INTRAARTICULAR FRACTURE OF LOWER END OF RIGHT RADIUS, SUBSEQUENT ENCOUNTER FOR CLOSED FRACTURE WITH ROUTINE HEALING: ICD-10-CM

## 2018-05-03 PROCEDURE — 97112 NEUROMUSCULAR REEDUCATION: CPT | Mod: GO | Performed by: OCCUPATIONAL THERAPIST

## 2018-05-03 PROCEDURE — 97110 THERAPEUTIC EXERCISES: CPT | Mod: GO | Performed by: OCCUPATIONAL THERAPIST

## 2018-05-03 NOTE — MR AVS SNAPSHOT
"              After Visit Summary   5/3/2018    Justice Yee    MRN: [id number]           Patient Information     Date Of Birth          1965        Visit Information        Provider Department      5/3/2018 4:00 PM Delilah Rahman OT [facility]        Today's Diagnoses     Right wrist pain    -  1    Radial styloid tenosynovitis        Other intraarticular fracture of lower end of right radius, subsequent encounter for closed fracture with routine healing        Wrist stiffness, right        Stiffness of finger joint, right           Follow-ups after your visit        Your next 10 appointments already scheduled     May 10, 2018  4:00 PM CDT   (Arrive by 3:45 PM)   RETURN HAND with Carlitos Woodard MD   [facility] ([facility])    [address]  [address] Floor  [facility] [address] [phone]              Who to contact     If you have questions or need follow up information about today's clinic visit or your schedule please contact [place] [facility] directly at [phone].  Normal or non-critical lab and imaging results will be communicated to you by [employer], letter or phone within 4 business days after the clinic has received the results. If you do not hear from us within 7 days, please contact the clinic through [email or web address] or phone. If you have a critical or abnormal lab result, we will notify you by phone as soon as possible.  Submit refill requests through [employer] or call your pharmacy and they will forward the refill request to us. Please allow 3 business days for your refill to be completed.          Additional Information About Your Visit        TouchbaseharAlinto Information     [employer] lets you send messages to your doctor, view your test results, renew your prescriptions, schedule appointments and more. To sign up, go to [email or web address] . Click on \"Log in\" on the left side of the screen, which will take you to the Welcome page. Then " "click on \"Sign up Now\" on the right side of the page.     You will be asked to enter the access code listed below, as well as some personal information. Please follow the directions to create your username and password.     Your access code is: [email or web address]  Expires: 2018  7:30 AM     Your access code will  in 90 days. If you need help or a new code, please call your [place] clinic or [phone].        Care EveryWhere ID     This is your Care EveryWhere ID. This could be used by other organizations to access your [place] medical records  [id number]         Blood Pressure from Last 3 Encounters:   17 128/83   17 156/85    Weight from Last 3 Encounters:   17 96.2 kg (212 lb)   17 96.2 kg (212 lb)   17 97.1 kg (214 lb)              We Performed the Following     NEUROMUSCULAR RE-EDUCATION     THERAPEUTIC EXERCISES        Primary Care Provider Fax #    Physician No Ref-Primary [phone]       No address on file        Equal Access to Services     [facility]: Hadii orlando Malloy, waaxda luqadaha, qaybta kaalmachelly poole, sandra waters . So [facility] [phone].    ATENCIÓN: Si habla español, tiene a otero disposición servicios gratuitos de asistencia lingüística. Llame al [phone].    We comply with applicable federal civil rights laws and [place] laws. We do not discriminate on the basis of race, color, national origin, age, disability, sex, sexual orientation, or gender identity.            Thank you!     Thank you for choosing [facility] HAND THER[facility]  for your care. Our goal is always to provide you with excellent care. Hearing back from our patients is one way we can continue to improve our services. Please take a few minutes to complete the written survey that you may receive in the mail after your visit with us. Thank you!             Your Updated Medication List - Protect others around you: Learn how to safely use, store and " throw away your medicines at www.disposemymeds.org.          This list is accurate as of 5/3/18  4:44 PM.  Always use your most recent med list.                   Brand Name Dispense Instructions for use Diagnosis    meloxicam 7.5 MG tablet    MOBIC    60 tablet    Take 1 tablet (7.5 mg) by mouth daily    Other closed intra-articular fracture of distal end of right radius with routine healing, subsequent encounter       TYLENOL PO      Take 320 mg by mouth

## 2018-05-03 NOTE — PROGRESS NOTES
Hand Therapy Progress Note  5/3/2018  Reporting period: 3/22/18 to current date    S:  Subjective changes as noted by patient: still having wrist pain mostly the side to side and pushing off with weight bearing. If I lift straight I am fine, but can't lift or pour a gallon of milk   Functional changes noted by patient: Improvement in Household Chores   No Change to Work Tasks  Response to previous treatment:  fair  Patient has noted adverse reaction to:   None      Objective:  Pain Level Report  VAS(0-10) 12/8/2017 2/2/18 2/13/18 2/27/18 3/20/18 5/3/18   At Rest: 4/10 5/10 1-2/10 0/10 0/10 0/10   With Use: 5/10 8/10 2-4/10 6/10 pushing up from hands 5/10 doing things 5/10 with motion, mario alberto wrist deviation moves     Report of Pain:  Location:  In the ulnar wrist mostly, but still more in the radial wrist  Pain Quality:sore  Frequency: with use and weight bearing  Pain is worst:  Pushing up  from the hand, still pain and prevents from using hand to push up  Exacerbated by:  Weight bearing and sideways liftig  Relieved by:  Rest      Wrist JPS ROM / Value   (Joint Position Sense)  3/20/2018  Cast off 3/20/2018       Affected wrist right Absolute value  Absolute Value   Trial 1 18 -2     Trial 2 10 -10     Mean / Average  -6           Radial Wrist: Provocative Tests  Radial Wrist Zone: Provocative Tests: 1/16/2018 2/13/18 2/27/18 3/20/18 5/3   Pain Report:  - none    + mild    ++ moderate    +++ severe  Right right right right right   Finkelstein's Test ++ + ++ + +   Dequervain's: APL test + + + + +   EPB test:  + + - + +   Intersection: APL/EPB & ECRB/L test - - -     ECU stress test:  ++ + - - +   ECU: supination overpressure + + + + +   Ulnar wrist/TFCC     + for TFCC grind/appoximation    Ulnar fovea area    Pain +        ROM:  Pain Report:  - none    + mild    ++ moderate    +++ severe   Wrist  12/8/17 12/13/17 12/13/17 1/2/18 2/13/18 2/27/18 3/20/18 4/9/18 4/16 5/3   AROM (PROM) R L R  Right  Right right       Extension 15 65 35 40 50 54 58 55++ end range 56 tight no pain  (60)   60   Flexion 20 62 32 41 45 50 52 50 50 (60) 55   RD 0 18 18 12 10 20 + at Radial wrist 17+ at radial wrist   20+ radial wrist   UD 0 32 15 23 35 30+ at Ulnar wrist 32+ at ulnar wrist   30+   Supination 45 85 75 74 70        Pronation 90 90 90  90          ROM:  Thumb 2018 2018 2/27/18 3/20/18   AROM(PROM) Right Left right right   MP /60 /70 x/60 /62   IP /45 /70 /52 /52   RAbd 54+ 60 58 58   PAbd 54+ 55 55+ 55+   Retropulsion       Kapandji Opposition Scale (0-10/10)   9/10 9.5     7   Fingers  Extension/Flexion, AROM(PROM)  Date:   12/13 1/2/18 1/8 12/27/18 3/20/18   Side:  Right       AROM(PROM)          Index:  MP               PIP               DIP  FERRER:  75  95  50 -14/72  0/84  0/35  177 0/82  0/92  0/40 0/82  0/105  0/45  232     Long:   MP               PIP               DIP  FERRER:  71  97  61 0/80  0/95  0/50  225  0/85  0/110  0/65  260 /87  /107  /68  262    Ring:    MP                PIP                DIP  FERRER:  70  97  55 0/77  0/97  0/55  229  0/82  0/105  0/65  212     Small:   MP                PIP                DIP  FERRER:  75  95  58 -7/80  -4/94  0/58  221  0/85  0/100  0/70  255    Thumb:  MP                IP                RABD                PABD  25/61  +/30  45  55 -19/55  +/40  51  53          STRENGTH:   (Measured in pounds)     2018  3/20/18  5/3    Trials Right Left Right Left right    1  2  3  Av 81 47  50  56  52 85 53  58  50  54 88       3 pt Pinch 2018  3/20/18  5/3    Trials Right Left Right Left right    1  2  3  Avg: 10+ at radial wrist 18 14  16      Lateral Pinch 2018  3/20/18  5/3    Trials Right Left Right Left right    1  2  3  Av 19 17  16      Assessment:  Response to therapy has been improvement to:  ROM of Wrist:  Ext  and Flex  Strength:   and pinch  Response to therapy has been lack of progress in:  Pain:  No change in pain at the ulnar and radial wrist  pain to stress  Weight bearing and gripping is still difficult with any wrist deviation    Overall Assessment:  No change of symptoms has been noted.  Patient is not progressing as expected.  Patient would benefit from continued therapy to achieve rehab potential  STG/LTG:  STGoals have been reviewed and no progress has been made;  see goal sheet for details and changes.  I have re-evaluated this patient and find that the nature, scope, duration and intensity of the therapy is appropriate for the medical condition of the patient.      P:  Frequency/Duration:  Recommend continuing to see patient  1 X week, once daily  for 6 weeks    Recommendations for Continued Therapy  Treatment Plan:    Additions to Treatment Plan -  Modalities:  US  Therapeutic Exercise:  Tendon Gliding, Isotonics, Isometrics and Stabilization  Neuromuscular re-education:  Stabilization  Self Care:  Ergonomic Considerations  Continue thumb spica orthosis for support         Home Program:   Pen roll  Icing to ECU and avoid painful motions.   Continue wearing thumb spica for Dequervains as needed, and ECU tendonitis  Continue thumb and Wrist ROM exercises  DTM AROM and wall ball wt bearing, on fist/no pain provocation  DTM isometrics no pain, keep wrist is neutral position  5/3/2018  Counter  with dowel to fire FCR    Next Visit:  Progress strengthening with focus on wrist stability progressing slowly

## 2018-05-10 ENCOUNTER — RADIANT APPOINTMENT (OUTPATIENT)
Dept: GENERAL RADIOLOGY | Facility: CLINIC | Age: 53
End: 2018-05-10
Attending: PLASTIC SURGERY
Payer: OTHER MISCELLANEOUS

## 2018-05-10 ENCOUNTER — OFFICE VISIT (OUTPATIENT)
Dept: ORTHOPEDICS | Facility: CLINIC | Age: 53
End: 2018-05-10
Payer: OTHER MISCELLANEOUS

## 2018-05-10 DIAGNOSIS — S52.571G OTHER CLOSED INTRA-ARTICULAR FRACTURE OF DISTAL END OF RIGHT RADIUS WITH DELAYED HEALING, SUBSEQUENT ENCOUNTER: ICD-10-CM

## 2018-05-10 DIAGNOSIS — S52.571G OTHER CLOSED INTRA-ARTICULAR FRACTURE OF DISTAL END OF RIGHT RADIUS WITH DELAYED HEALING, SUBSEQUENT ENCOUNTER: Primary | ICD-10-CM

## 2018-05-10 PROBLEM — S52.571D OTHER INTRAARTICULAR FRACTURE OF LOWER END OF RIGHT RADIUS, SUBSEQUENT ENCOUNTER FOR CLOSED FRACTURE WITH ROUTINE HEALING: Status: RESOLVED | Noted: 2017-12-08 | Resolved: 2018-05-10

## 2018-05-10 PROBLEM — S52.571D OTHER CLOSED INTRA-ARTICULAR FRACTURE OF DISTAL END OF RIGHT RADIUS WITH ROUTINE HEALING, SUBSEQUENT ENCOUNTER: Status: RESOLVED | Noted: 2017-11-22 | Resolved: 2018-05-10

## 2018-05-10 NOTE — MR AVS SNAPSHOT
After Visit Summary   5/10/2018    Justice Yee    MRN: [id number]           Patient Information     Date Of Birth          1965        Visit Information        Provider Department      5/10/2018 4:00 PM Carlitos Woodard MD [facility]        Today's Diagnoses     Other closed intra-articular fracture of distal end of right radius with delayed healing, subsequent encounter    -  1       Follow-ups after your visit        Additional Services     HAND THERAPY       Hand Therapy Referral            OCCUPATIONAL MEDICINE REFERRAL       Your provider has referred you to: Patient preference    Referral to help with recovery and return to work.    Please be aware that coverage of these services is subject to the terms and limitations of your health insurance plan.  Call member services at your health plan with any benefit or coverage questions.      Please bring the following to your appointment:  >>   Any x-rays, CTs or MRIs which have been performed.  Contact the facility where they were done to arrange for  prior to your scheduled appointment.    >>   List of current medications   >>   This referral request   >>   Any documents/labs given to you for this referral                  Follow-up notes from your care team     Return in about 8 weeks (around 7/5/2018), or if symptoms worsen or fail to improve.      Your next 10 appointments already scheduled     Jun 28, 2018  4:00 PM CDT   (Arrive by 3:45 PM)   RETURN HAND with Carlitos Woodard MD   [facility] ([facility] [place])    [address] [place] [id number] [phone]              Who to contact     Please call your clinic at [phone] to:    Ask questions about your health    Make or cancel appointments    Discuss your medicines    Learn about your test results    Speak to your doctor            Additional Information About Your Visit        MyChart Information      [employer] is an electronic gateway that provides easy, online access to your medical records. With [employer], you can request a clinic appointment, read your test results, renew a prescription or communicate with your care team.     To sign up for [employer] visit the website at [email or web address]   You will be asked to enter the access code listed below, as well as some personal information. Please follow the directions to create your username and password.     Your access code is: [email or web address]  Expires: 2018  5:59 AM     Your access code will  in 90 days. If you need help or a new code, please contact your [facility] or call [phone] for assistance.        Care EveryWhere ID     This is your Care EveryWhere ID. This could be used by other organizations to access your [place] medical records  [id number]         Blood Pressure from Last 3 Encounters:   17 128/83   17 156/85    Weight from Last 3 Encounters:   17 212 lb   17 212 lb   17 214 lb              We Performed the Following     HAND THERAPY     OCCUPATIONAL MEDICINE REFERRAL        Primary Care Provider Fax #    Physician No Ref-Primary [phone]       No address on file        Equal Access to Services     DANIEL HANSEN : Hadii orlando perezo Sorubén, waaxda luqadaha, qaybta kaalmada adeegyada, sandra vela. So [facility] [phone].    ATENCIÓN: Si habla español, tiene a otero disposición servicios gratuitos de asistencia lingüística. Llame al [phone].    We comply with applicable federal civil rights laws and [place] laws. We do not discriminate on the basis of race, color, national origin, age, disability, sex, sexual orientation, or gender identity.            Thank you!     Thank you for choosing [facility]  for your care. Our goal is always to provide you with excellent care. Hearing back from our patients is one way we  can continue to improve our services. Please take a few minutes to complete the written survey that you may receive in the mail after your visit with us. Thank you!             Your Updated Medication List - Protect others around you: Learn how to safely use, store and throw away your medicines at www.disposemymeds.org.          This list is accurate as of 5/10/18 11:59 PM.  Always use your most recent med list.                   Brand Name Dispense Instructions for use Diagnosis    TYLENOL PO      Take 320 mg by mouth

## 2018-05-10 NOTE — LETTER
Date:May 14, 2018      Patient was self referred, no letter generated. Do not send.        [facility] Health Information

## 2018-05-10 NOTE — PROGRESS NOTES
Patient returns for a follow-up visit after open reduction internal fixation of right distal radius fracture.    INTERVAL HISTORY: Patient reports that overall he is doing much better.  His  strength has improved to 55-60 pounds in the right hand.  His wrist active range of motion is improving significantly.  There is still is mild tenderness with radial and ulnar deviation.  He has returning sensation to the proximal palm.    PHYSICAL EXAMINATION:  General: In no acute distress.  On exam of the right wrist, there is no significant tenderness on palpation of all aspects of the right wrist.  There is minimal swelling of the wrist, improved from last visit.  DRUJ is stable.  Has wrist motion within normal limits with 60 of extension, 55 of flexion, and full pronation/supination.  He is able to make a composite fist.  He has intact light touch sensation and pin prick sensation over the proximal palm.    IMAGING: Repeat x-ray of his right wrist was taken today.  This revealed healing of distal radius fracture with intact hardware and healed ulnar styloid fracture.    ASSESSMENT: 6 months status post open reduction internal fixation of right distal radius intra-articular fracture.  Doing much better.    PLAN: Patient would benefit from further strengthening exercises.  Given that he has healed his injuries, I encouraged him to start increasing his activity level.  I have given him a work letter with a restriction for 25 lbs of lifting at this time.  I expect that this can be increased rapidly.  I am also referring him to occupational medicine or PM&R for ongoing care. I will see him back in 8 weeks.    Total time spent with patient was 15 min of which greater than 50% was in counseling.

## 2018-05-10 NOTE — LETTER
Return to Work  May 10, 2018     Seen today: yes    Patient:  Justice Yee  :   1965  MRN:     [id number]  Physician: CARLITOS SOARES may return to work on Date: 5/10/2018.      The next clinic appointment is scheduled for (date/time) 8 weeks PRN.    Patient limitations:  R wrist weight bearing as tolerated. No lifting greater than 25 lbs. Must be given breaks for frequent exercises.            Electronically signed by Carlitos Soares MD

## 2018-05-10 NOTE — LETTER
5/10/2018       RE: Justice Yee  [address]   [facility] [facility] [address]     Dear Colleague,    Thank you for referring your patient, Justice Yee, to the [facility] at [facility]. Please see a copy of my visit note below.    Patient returns for a follow-up visit after open reduction internal fixation of right distal radius fracture.    INTERVAL HISTORY: Patient reports that overall he is doing much better.  His  strength has improved to 55-60 pounds in the right hand.  His wrist active range of motion is improving significantly.  There is still is mild tenderness with radial and ulnar deviation.  He has returning sensation to the proximal palm.    PHYSICAL EXAMINATION:  General: In no acute distress.  On exam of the right wrist, there is no significant tenderness on palpation of all aspects of the right wrist.  There is minimal swelling of the wrist, improved from last visit.  DRUJ is stable.  Has wrist motion within normal limits with 60 of extension, 55 of flexion, and full pronation/supination.  He is able to make a composite fist.  He has intact light touch sensation and pin prick sensation over the proximal palm.    IMAGING: Repeat x-ray of his right wrist was taken today.  This revealed healing of distal radius fracture with intact hardware and healed ulnar styloid fracture.    ASSESSMENT: 6 months status post open reduction internal fixation of right distal radius intra-articular fracture.  Doing much better.    PLAN: Patient would benefit from further strengthening exercises.  Given that he has healed his injuries, I encouraged him to start increasing his activity level.  I have given him a work letter with a restriction for 25 lbs of lifting at this time.  I expect that this can be increased rapidly.  I am also referring him to occupational medicine or PM&R for ongoing care. I will see him back in 8 weeks.    Total time spent with  patient was 15 min of which greater than 50% was in counseling.    Again, thank you for allowing me to participate in the care of your patient.      Sincerely,    Carlitos Woodard MD

## 2018-05-10 NOTE — NURSING NOTE
Reason For Visit:   Chief Complaint   Patient presents with     Surgical Followup     6 month pop ORIF of right distal radius fracture.  DOS: 11/14/201       Primary MD: No Ref-Primary, Physician  Ref. MD: Self    Age: 52 year old    ?  No      There were no vitals taken for this visit.      Pain Assessment  Patient Currently in Pain: Denies, pain only with motion               QuickDASH Assessment  No flowsheet data found.       Current Outpatient Prescriptions   Medication Sig Dispense Refill     Acetaminophen (TYLENOL PO) Take 320 mg by mouth       meloxicam (MOBIC) 7.5 MG tablet Take 1 tablet (7.5 mg) by mouth daily (Patient not taking: Reported on 5/10/2018) 60 tablet 1       No Known Allergies    Paola Raman, ATC

## 2018-05-16 ENCOUNTER — TELEPHONE (OUTPATIENT)
Dept: ORTHOPEDICS | Facility: CLINIC | Age: 53
End: 2018-05-16

## 2018-05-16 NOTE — TELEPHONE ENCOUNTER
[facility] Call Center    Phone Message    May a detailed message be left on voicemail: yes    Reason for Call: Form or Letter   Type or form/letter needing completion: Employer needs clarification on work restrictions.  Please answer the follow questions: 1. What is meant by frequent breaks? How often? How long?  2.  Can he lift up to 25 lbs or only as much as he can tolerate up to 25 lbs?  What should be his frequency of lifting?  Would he be able to lift occassionally or constantly through his shift?  Provider: Dr. Carlitos Woodard  Date form needed: asap  Once completed: Fax form to: To [facility] [phone] Att: Ibeth      Action Taken: Message routed to:  [facility] ump ortho

## 2018-05-18 ENCOUNTER — TELEPHONE (OUTPATIENT)
Dept: ORTHOPEDICS | Facility: CLINIC | Age: 53
End: 2018-05-18

## 2018-05-18 NOTE — TELEPHONE ENCOUNTER
C called to get the clarified restrictions sent to her.     Letter on 5/17/18 was faxed over to her.    Lindsey Valentino LPN

## 2018-05-18 NOTE — TELEPHONE ENCOUNTER
[facility] requests an appointment with an orthopedic surgeon for a WC injury.  She reports patient has had  right shoulder pain since January and has not had this evaluated.    Recommendation is an initial consultation with a  non-surgeon since no imaging has been done.  She is agreeable with this.  Appointment scheduled with Dr De La Torre.

## 2018-05-22 ENCOUNTER — THERAPY VISIT (OUTPATIENT)
Dept: OCCUPATIONAL THERAPY | Facility: CLINIC | Age: 53
End: 2018-05-22
Payer: OTHER MISCELLANEOUS

## 2018-05-22 DIAGNOSIS — M25.531 RIGHT WRIST PAIN: Primary | ICD-10-CM

## 2018-05-22 DIAGNOSIS — M25.631 WRIST STIFFNESS, RIGHT: ICD-10-CM

## 2018-05-22 DIAGNOSIS — M25.641 STIFFNESS OF FINGER JOINT, RIGHT: ICD-10-CM

## 2018-05-22 DIAGNOSIS — M65.4 RADIAL STYLOID TENOSYNOVITIS: ICD-10-CM

## 2018-05-22 PROCEDURE — 97035 APP MDLTY 1+ULTRASOUND EA 15: CPT | Mod: GO | Performed by: OCCUPATIONAL THERAPIST

## 2018-05-22 PROCEDURE — 97140 MANUAL THERAPY 1/> REGIONS: CPT | Mod: GO | Performed by: OCCUPATIONAL THERAPIST

## 2018-05-22 PROCEDURE — 97110 THERAPEUTIC EXERCISES: CPT | Mod: GO | Performed by: OCCUPATIONAL THERAPIST

## 2018-05-22 NOTE — PROGRESS NOTES
Hand Therapy SOAP Note  5/22/2018      S:  See flowsheet        New MD Orders: 5/410/18: Priority: Routine Class: SHALOM  Comment:Hand Therapy Referral Surgical Procedure: R ORIF of R distal radius Evaluate and Treat Yes Cmt: for strengthening Visit Frequency PRN       Objective:  Pain Level Report  VAS(0-10) 12/8/2017 2/2/18 2/13/18 2/27/18 3/20/18 5/3/18 5/22/18   At Rest: 4/10 5/10 1-2/10 0/10 0/10 0/10 0/10   With Use: 5/10 8/10 2-4/10 6/10 pushing up from hands 5/10 doing things 5/10 with motion, mario alberto wrist deviation moves 5/10 with motion, more on the ulnar side, radial side with push and turn     Report of Pain:  Location:  In the ulnar wrist mostly, but still more in the radial wrist  Pain Quality:sore  Frequency: with use and weight bearing  Pain is worst:  Pushing up  from the hand, still pain and prevents from using hand to push up  Exacerbated by:  Weight bearing and sideways liftig  Relieved by:  Rest      Wrist JPS ROM / Value   (Joint Position Sense)  3/20/2018  Cast off 3/20/2018       Affected wrist right Absolute value  Absolute Value   Trial 1 18 -2     Trial 2 10 -10     Mean / Average  -6           Radial Wrist: Provocative Tests  Radial Wrist Zone: Provocative Tests: 1/16/2018 2/13/18 2/27/18 3/20/18 5/3   Pain Report:  - none    + mild    ++ moderate    +++ severe  Right right right right right   Finkelstein's Test ++ + ++ + +   Dequervain's: APL test + + + + +   EPB test:  + + - + +   Intersection: APL/EPB & ECRB/L test - - -     ECU stress test:  ++ + - - +   ECU: supination overpressure + + + + +   Ulnar wrist/TFCC     + for TFCC grind/appoximation    Ulnar fovea area    Pain +        ROM:  Pain Report:  - none    + mild    ++ moderate    +++ severe   Wrist  12/8/17 12/13/17 12/13/17 1/2/18 2/13/18 2/27/18 3/20/18 4/9/18 4/16 5/3 5/22   AROM (PROM) R L R  Right  Right right    Right (AM measure)   Extension 15 65 35 40 50 54 58 55++ end range 56 tight no pain  (60)   60 55   Flexion 20 62 32 41  45 50 52 50 50 (60) 55 50   RD 0 18 18 12 10 20 + at Radial wrist 17+ at radial wrist   20+ radial wrist    UD 0 32 15 23 35 30+ at Ulnar wrist 32+ at ulnar wrist   30+    Supination 45 85 75 74 70         Pronation 90 90 90  90           ROM:  Thumb 2018 2018 2/27/18 3/20/18 5   AROM(PROM) Right Left right right    MP /60 /70 x/60 /62    IP /45 /70 /52 /52    RAbd 54+ 60 58 58    PAbd 54+ 55 55+ 55+    Retropulsion        Kapandji Opposition Scale (0-10/10)   9/10 9.5      7   Fingers  Extension/Flexion, AROM(PROM)  Date:   12/13 1/2/18 1/8 12/27/18 3/20/18   Side:  Right       AROM(PROM)          Index:  MP               PIP               DIP  FERRER:  75  95  50 -14/72  0/84  0/35  177 0/82  0/92  0/40 0/82  0/105  0/45  232     Long:   MP               PIP               DIP  FERRER:  71  97  61 0/80  0/95  0/50  225  0/85  0/110  0/65  260 /87  /107  /68  262    Ring:    MP                PIP                DIP  FERRER:  70  97  55 0/77  0/97  0/55  229  0/82  0/105  0/65  212     Small:   MP                PIP                DIP  FERRER:  75  95  58 -7/80  -4/94  0/58  221  0/85  0/100  0/70  255    Thumb:  MP                IP                RABD                PABD  25/61  +/30  45  55 -19/55  +/40  51  53          STRENGTH:   (Measured in pounds)     2018  3/20/18  5/3     Trials Right Left Right Left right left right   1  2  3  Av 81 47  50  56  52 85 53  58  50  54 88 60       3 pt Pinch 2018  3/20/18  5/3    Trials Right Left Right Left right    1  2  3  Avg: 10+ at radial wrist 18 14  16      Lateral Pinch 2018  3/20/18  5/3    Trials Right Left Right Left right    1  2  3  Av 19 17  16      Assessment:  See flowsheet      P:  Frequency/Duration:  Recommend continuing to see patient  1 X week, once daily  for 6 weeks    Recommendations for Continued Therapy  Treatment Plan:    Additions to Treatment Plan -  Modalities:  US  Therapeutic Exercise:  Tendon Gliding, Isotonics,  Isometrics and Stabilization  Neuromuscular re-education:  Stabilization  Self Care:  Ergonomic Considerations  Continue thumb spica orthosis for support         Home Program:   Pen roll  Icing to ECU and avoid painful motions.   Continue wearing thumb spica for Dequervains as needed, and ECU tendonitis  Continue thumb and Wrist ROM exercises  DTM AROM and wall ball wt bearing, on fist/no pain provocation  DTM isometrics no pain, keep wrist is neutral position  5/3/2018  Counter  with dowel to fire FCR  5/22/2018  Marbles in wiffle ball, with RUE  Pt will have wife do deep 1st DC massage    Next Visit:  Progress strengthening with focus on wrist stability progressing slowly  5/22/2018  Check on changes with 1st DC and ECU after deep release on 5/22

## 2018-05-22 NOTE — MR AVS SNAPSHOT
After Visit Summary   5/22/2018    Justice Yee    MRN: [id number]           Patient Information     Date Of Birth          1965        Visit Information        Provider Department      5/22/2018 7:30 AM Delilah Rahman OT [facility]        Today's Diagnoses     Right wrist pain    -  1    Radial styloid tenosynovitis        Wrist stiffness, right        Stiffness of finger joint, right           Follow-ups after your visit        Your next 10 appointments already scheduled     May 29, 2018  7:30 AM CDT   SHALOM Hand with Delilah Rahman OT   [facility] ([facility])    [address] [place] [address]  [address] [facility] [id number] [phone]            May 30, 2018  4:45 PM CDT   (Arrive by 4:30 PM)   New Patient Visit with Tom De La Torre MD   [facility] ([facility])    [address] [place] [address]  [address] [facility] [id number] [phone]            Jun 28, 2018  4:00 PM CDT   (Arrive by 3:45 PM)   RETURN HAND with Carlitos Woodard MD   [facility] ([facility])    [address] [place] [address]  [address] [facility] [id number] [phone]              Who to contact     If you have questions or need follow up information about today's clinic visit or your schedule please contact [facility] directly at [phone].  Normal or non-critical lab and imaging results will be communicated to you by MyChart, letter or phone within 4 business days after the clinic has received the results. If you do not hear from us within 7 days, please contact the clinic through MyChart or phone. If you have a critical or abnormal lab result, we will notify you by phone as soon as possible.  Submit refill requests through [employer] or call your pharmacy and they will forward the refill request to us. Please allow 3 business days for your refill to be completed.           "Additional Information About Your Visit        MyChart Information     [employer] lets you send messages to your doctor, view your test results, renew your prescriptions, schedule appointments and more. To sign up, go to [email or web address] . Click on \"Log in\" on the left side of the screen, which will take you to the Welcome page. Then click on \"Sign up Now\" on the right side of the page.     You will be asked to enter the access code listed below, as well as some personal information. Please follow the directions to create your username and password.     Your access code is: [email or web address]  Expires: 2018  5:59 AM     Your access code will  in 90 days. If you need help or a new code, please call your [place] clinic or [phone].        Care EveryWhere ID     This is your Care EveryWhere ID. This could be used by other organizations to access your [place] medical records  [id number]         Blood Pressure from Last 3 Encounters:   17 128/83   17 156/85    Weight from Last 3 Encounters:   17 96.2 kg (212 lb)   17 96.2 kg (212 lb)   17 97.1 kg (214 lb)              We Performed the Following     MANUAL THER TECH,1+REGIONS,EA 15 MIN     THERAPEUTIC EXERCISES     ULTRASOUND THERAPY        Primary Care Provider Fax #    Physician No Ref-Primary [phone]       No address on file        Equal Access to Services     DANIEL HANSEN : Hadii orlando ku hadasho Somyrtleali, waaxda luqadaha, qaybta kaalmada adeegyada, sandra waters . So [facility] [phone].    ATENCIÓN: Si habla español, tiene a otero disposición servicios gratuitos de asistencia lingüística. Llame al [phone].    We comply with applicable federal civil rights laws and [place] laws. We do not discriminate on the basis of race, color, national origin, age, disability, sex, sexual orientation, or gender identity.            Thank you!     Thank you for choosing [facility]ER[facility]  for your " care. Our goal is always to provide you with excellent care. Hearing back from our patients is one way we can continue to improve our services. Please take a few minutes to complete the written survey that you may receive in the mail after your visit with us. Thank you!             Your Updated Medication List - Protect others around you: Learn how to safely use, store and throw away your medicines at www.disposemymeds.org.          This list is accurate as of 5/22/18  3:48 PM.  Always use your most recent med list.                   Brand Name Dispense Instructions for use Diagnosis    TYLENOL PO      Take 320 mg by mouth

## 2018-05-29 ENCOUNTER — TELEPHONE (OUTPATIENT)
Dept: OCCUPATIONAL THERAPY | Facility: CLINIC | Age: 53
End: 2018-05-29

## 2018-05-29 ENCOUNTER — THERAPY VISIT (OUTPATIENT)
Dept: OCCUPATIONAL THERAPY | Facility: CLINIC | Age: 53
End: 2018-05-29
Payer: OTHER MISCELLANEOUS

## 2018-05-29 DIAGNOSIS — M25.631 WRIST STIFFNESS, RIGHT: ICD-10-CM

## 2018-05-29 DIAGNOSIS — M25.531 RIGHT WRIST PAIN: Primary | ICD-10-CM

## 2018-05-29 DIAGNOSIS — M25.531 RIGHT WRIST PAIN: ICD-10-CM

## 2018-05-29 DIAGNOSIS — M65.4 RADIAL STYLOID TENOSYNOVITIS: ICD-10-CM

## 2018-05-29 DIAGNOSIS — M25.641 STIFFNESS OF FINGER JOINT, RIGHT: ICD-10-CM

## 2018-05-29 PROCEDURE — 97110 THERAPEUTIC EXERCISES: CPT | Mod: GO | Performed by: OCCUPATIONAL THERAPIST

## 2018-05-29 PROCEDURE — 97035 APP MDLTY 1+ULTRASOUND EA 15: CPT | Mod: GO | Performed by: OCCUPATIONAL THERAPIST

## 2018-05-29 PROCEDURE — 97763 ORTHC/PROSTC MGMT SBSQ ENC: CPT | Mod: GO | Performed by: OCCUPATIONAL THERAPIST

## 2018-05-29 NOTE — TELEPHONE ENCOUNTER
FUTURE VISIT INFORMATION      FUTURE VISIT INFORMATION:    Date: 5/30/18    Time: 4:45    Location:   REFERRAL INFORMATION:    Referring provider:  Self    Referring providers clinic:      Reason for visit/diagnosis  R shoulder pain        RECORDS STATUS      ALL RECORDS INTERNAL

## 2018-05-29 NOTE — MR AVS SNAPSHOT
After Visit Summary   5/29/2018    Justice Yee    MRN: [id number]           Patient Information     Date Of Birth          1965        Visit Information        Provider Department      5/29/2018 7:30 AM Delilah Rahman OT [facility]        Today's Diagnoses     Right wrist pain    -  1    Radial styloid tenosynovitis        Wrist stiffness, right        Stiffness of finger joint, right           Follow-ups after your visit        Your next 10 appointments already scheduled     May 30, 2018  4:45 PM CDT   (Arrive by 4:30 PM)   New Patient Visit with Tom De La Torre MD   [facility] ([facility])    [address] [place] [address]  [address] [facility] [address]   [phone]            Jun 05, 2018  4:30 PM CDT   SHALOM Hand with Delilah Rahman OT   [facility] ([facility])    [address] [place] [id number] [phone]            Jun 12, 2018  4:30 PM CDT   SHALOM Hand with CARLIE Joy   [facility] ([facility])    [address] [place] [id number] [phone]            Jun 21, 2018  4:00 PM CDT   SHALOM Hand with CARLIE Joy   [facility] ([facility])    [address] [place] [address] [phone]            Jun 28, 2018  4:00 PM CDT   (Arrive by 3:45 PM)   RETURN HAND with Carlitos Woodard MD   [facility] ([facility])    [address] [place] [address] [phone]            Jun 28, 2018  5:00 PM CDT   SHALOM Hand with Delilah [facility]man OT    [facility] ([facility])    [address] [place] [address] [phone]              Who to contact     If you have questions or need follow up information about  "today's clinic visit or your schedule please contact [facility] directly at [phone].  Normal or non-critical lab and imaging results will be communicated to you by MyChart, letter or phone within 4 business days after the clinic has received the results. If you do not hear from us within 7 days, please contact the clinic through [employer] or phone. If you have a critical or abnormal lab result, we will notify you by phone as soon as possible.  Submit refill requests through [employer] or call your pharmacy and they will forward the refill request to us. Please allow 3 business days for your refill to be completed.          Additional Information About Your Visit        [employer] Information     [employer] lets you send messages to your doctor, view your test results, renew your prescriptions, schedule appointments and more. To sign up, go to [email or web address] . Click on \"Log in\" on the left side of the screen, which will take you to the Welcome page. Then click on \"Sign up Now\" on the right side of the page.     You will be asked to enter the access code listed below, as well as some personal information. Please follow the directions to create your username and password.     Your access code is: [email or web address]  Expires: 2018  5:59 AM     Your access code will  in 90 days. If you need help or a new code, please call your [place] clinic or [phone].        Care EveryWhere ID     This is your Care EveryWhere ID. This could be used by other organizations to access your [place] medical records  [id number]         Blood Pressure from Last 3 Encounters:   17 128/83   17 156/85    Weight from Last 3 Encounters:   17 96.2 kg (212 lb)   17 96.2 kg (212 lb)   17 97.1 kg (214 lb)              We Performed the Following     C OT ORTHOTICS/PROSTH MGMT &/TRAING SBSQ ENCTR, EA 15 MIN     THERAPEUTIC EXERCISES     ULTRASOUND THERAPY        Primary Care Provider Fax #    " Physician No Ref-Primary [phone]       No address on file        Equal Access to Services     YUEZULEIMA JADE : Hadii aad ku hadlindseyolya Malloy, wasnowda nnamdi, andressagerard castelanboochelly poole, sandra zelayanegrakip vela. So [facility] [phone].    ATENCIÓN: Si habla español, tiene a otero disposición servicios gratuitos de asistencia lingüística. Llame al [phone].    We comply with applicable federal civil rights laws and [place] laws. We do not discriminate on the basis of race, color, national origin, age, disability, sex, sexual orientation, or gender identity.            Thank you!     Thank you for choosing [facility] HAND [facility]ER[facility]  for your care. Our goal is always to provide you with excellent care. Hearing back from our patients is one way we can continue to improve our services. Please take a few minutes to complete the written survey that you may receive in the mail after your visit with us. Thank you!             Your Updated Medication List - Protect others around you: Learn how to safely use, store and throw away your medicines at www.disposemymeds.org.          This list is accurate as of 5/29/18  2:01 PM.  Always use your most recent med list.                   Brand Name Dispense Instructions for use Diagnosis    TYLENOL PO      Take 320 mg by mouth

## 2018-05-29 NOTE — TELEPHONE ENCOUNTER
Call placed to  adjustor, requesting 4 more visits, making this 1x/week for a total of 28 visits. These were  approved per:  Jessica Comp, Adj: Ivy Barlow [phone] / # [id number] / DOI: 11-7-2017    Delilah BARRIOS, CARLIE/L, CHT

## 2018-05-29 NOTE — PROGRESS NOTES
Hand Therapy Progress Note  Current Date: 5/29/2018  Reporting period: 5/3/18 to current date     Diagnosis: closed intra-articular fracture of distal end of right radius with routine healing   DOI: 11/7/17  DOS: 11/14/17  Procedure: Open Reduction Internal Fixation of Right Distal Radius Fracture  Post: 6 months  Referring MD: Carlitos Woodard MD        Subjective:  Justice Yee is a 52 year old R hand dominant male.  Patient reports symptoms of pain and stiffness/loss of motion of the right hand and wrist which occurred due to fall off ladder.  Occupational Profile Information:  Current occupation is    Currently not working due to present treatment problem  Job Tasks: operating a machine/assembly, prolonged standing, lifting/carrying, pushing/pulling, repetitive tasks      S:  Subjective changes as noted by patient: Still has pain with opening a jar and twisting, no problems carrying a bag when it is straight down, but lifting to waist level and to over head,  Any sideways motions causes pain at the both sides of the wrist, any pushing or wt bearing is not comfortable through. To get on the floor is a challenge. Now the shoulder is hurting.  Functional changes noted by patient: No Change to Work Tasks and Household Chores  Response to previous treatment:  fair  Patient has noted adverse reaction to:   None    Objective:  Pain Level Report  VAS(0-10) 12/8/2017 2/2/18 2/13/18 2/27/18 3/20/18 5/3/18 5/22/18 5/29/18   At Rest: 4/10 5/10 1-2/10 0/10 0/10 0/10 0/10    With Use: 5/10 8/10 2-4/10 6/10 pushing up from hands 5/10 doing things 5/10 with motion, mario alberto wrist deviation moves 5/10 with motion, more on the ulnar side, radial side with push and turn 5/10 with motion, can cause a quick pain, causes me to  jump     Report of Pain:  Location:  Wrist and shoulder  Pain Quality:sore and can be sharp, can be more central wrist.   Frequency: with use and weight bearing  Pain is worst:  Pushing up and  twisting from the hand, still pain and prevents from using hand to push up  Exacerbated by:  Weight bearing, twisting, and sideways lifting  Relieved by:  Rest      Wrist JPS ROM / Value   (Joint Position Sense)  3/20/2018   3/20/2018   5/29/18    Affected wrist right Absolute value  Absolute Value   Trial 1 18 -2 20 19   Trial 2 10 -10 20 16   Mean / Average  -6           Radial Wrist: Provocative Tests  Radial Wrist Zone: Provocative Tests: 1/16/2018 2/13/18 2/27/18 3/20/18 5/3 5/29/18   Pain Report:  - none    + mild    ++ moderate    +++ severe  Right right right right right right   Finkelstein's Test ++ + ++ + + +   Dequervain's: APL test + + + + + +   EPB test:  + + - + + +   Intersection: APL/EPB & ECRB/L test - - -   -   ECU stress test:  ++ + - - + +   ECU: supination overpressure + + + + +    Ulnar wrist/TFCC     + for TFCC grind/appoximation  ++   Ulnar fovea area    Pain +  +       ROM:  Pain Report:  - none    + mild    ++ moderate    +++ severe   Wrist  12/8/17 2/13/18 2/27/18 3/20/18 4/9/18 4/16 5/3 5/22 5/29/18   AROM (PROM) R Right  Right right    Right (AM measure) R   Extension 15 50 54 58 55++ end range 56 tight no pain  (60)   60 55 55   Flexion 20 45 50 52 50 50 (60) 55 50 50   RD 0 10 20 + at Radial wrist 17+ at radial wrist   20+ radial wrist     UD 0 35 30+ at Ulnar wrist 32+ at ulnar wrist   30+     Supination 45 70          Pronation 90 90            ROM:  Thumb 2/13/2018 2/13/2018 2/27/18 3/20/18 5/29   AROM(PROM) Right Left right right right   MP /60 /70 x/60 /62    IP /45 /70 /52 /52    RAbd 54+ 60 58 58 58   PAbd 54+ 55 55+ 55+ 55   Retropulsion        Kapandji Opposition Scale (0-10/10)   9/10 9.5 9.75     7   Fingers  Extension/Flexion, AROM(PROM)  Date:   12/13 1/2/18 1/8 12/27/18 3/20/18 5/29   Side:  Right        AROM(PROM)           Index:  MP               PIP               DIP  FERRER:  75  95  50 -14/72  0/84  0/35  177 0/82  0/92  0/40 0/82  0/105  0/45  232      Long:   MP       "         PIP               DIP  FERRER:  71  97  61 0/80  0/95  0/50  225  0/85  0/110  0/65  260 /87  /107  /68  262 /85  /115  /70    Ring:    MP                PIP                DIP  FERRER:  70  97  55 0/77  0/97  0/55  229  0/82  0/105  0/65  212      Small:   MP                PIP                DIP  FERRER:  75  95  58 -7/80  -4/94  0/58  221  0/85  0/100  0/70  255     Thumb:  MP                IP                RABD                PABD  25/61  +/30  45  55 -19/55  +/40  51  53           STRENGTH:   (Measured in pounds)     2018  3/20/18  5/3  5/22 2018      Trials Right Left Right Left right left right right left   1  2  3  Av 81 47  50  56  52 85 53  58  50  54 88 60 61  With Wrist Restore: 65 88       3 pt Pinch 2018  3/20/18 5/3    Trials Right Left Right right    1  2  3  Avg: 10+ at radial wrist 18 14 16      Lateral Pinch 2018  3/20/18 5/3      Trials Right Left Right right      1  2  3  Av 19 17 16        Assessment:   Pt continues to c/o of the pain in the wrist, central and radial and ulnar aspects of the wrist.   Response to therapy, since the last note, has been progress in:  ROM of Thumb:  Flex and opposition  Fingers: DIP joint - Flex  Joint position sense of the right wrist has improved.   Response to therapy, since the last note, has been lack of progress in:  ROM of Wrist:  Ext , Flex, and continues to have pain with Radial Deviation and Ulnar Deviation  Strength:  , however: Of note; at the end of the session this date, pt was fitted with a \"Wrist Restore\" brace, which then he was tested for strength and ability to weight bear. His strength was increased immediately, and pt reported less pain with weightbearing, with pushing up from his chair and hand on the wall. He will wear this wrist brace today at work and report on any changes.  Pain:  With use continues at a moderate level  Self Care Skills:  Any heavy task, lifting  Or motions with the wrist being " challenged for twisting and deviation, he notes significant difficulties.    Overall Assessment:  Patient would benefit from continued therapy to achieve rehab potential  STG/LTG:  STGoals have been reviewed and no progress has been made;  see goal sheet for details and changes.  I have re-evaluated this patient and find that the nature, scope, duration and intensity of the therapy is appropriate for the medical condition of the patient.        P:  Frequency/Duration:  Recommend continuing to see patient  1 X week, once daily  for 4 more weeks    Recommendations for Continued Therapy  Treatment Plan:    Additions to Treatment Plan -  Modalities:  US  Therapeutic Exercise:  Tendon Gliding, Isotonics, Isometrics and Stabilization  Neuromuscular re-education:  Stabilization  Self Care:  Ergonomic Considerations  Continue thumb spica orthosis for support         Home Program:   Pen roll  Icing to ECU and avoid painful motions.   Continue wearing thumb spica for Dequervains as needed, and ECU tendonitis  Continue thumb and Wrist ROM exercises  DTM AROM and wall ball wt bearing, on fist/no pain provocation  DTM isometrics no pain, keep wrist is neutral position  5/3/2018  Counter  with dowel to fire FCR  5/22/2018  Marbles in wiffle ball, with RUE  Pt will have wife do deep 1st DC massage  5/29/2018  Fit with Wrist Restore, pt noted immediate pain reduction with wt bearing, will wear at work and day at home. And report on how this works to reduce pain and increase function    Next Visit:  Progress strengthening with focus on wrist stability progressing slowly  Check on changes with 1st DC and ECU after deep release on 5/22  Progress to water jug wrist work, if possible

## 2018-05-30 ENCOUNTER — RADIANT APPOINTMENT (OUTPATIENT)
Dept: GENERAL RADIOLOGY | Facility: CLINIC | Age: 53
End: 2018-05-30
Payer: OTHER MISCELLANEOUS

## 2018-05-30 ENCOUNTER — OFFICE VISIT (OUTPATIENT)
Dept: ORTHOPEDICS | Facility: CLINIC | Age: 53
End: 2018-05-30
Payer: OTHER MISCELLANEOUS

## 2018-05-30 ENCOUNTER — PRE VISIT (OUTPATIENT)
Dept: ORTHOPEDICS | Facility: CLINIC | Age: 53
End: 2018-05-30

## 2018-05-30 DIAGNOSIS — M25.511 RIGHT SHOULDER PAIN, UNSPECIFIED CHRONICITY: Primary | ICD-10-CM

## 2018-05-30 DIAGNOSIS — M25.511 RIGHT SHOULDER PAIN, UNSPECIFIED CHRONICITY: ICD-10-CM

## 2018-05-30 DIAGNOSIS — M75.41 IMPINGEMENT SYNDROME OF RIGHT SHOULDER: Primary | ICD-10-CM

## 2018-05-30 NOTE — MR AVS SNAPSHOT
After Visit Summary   5/30/2018    Justice Yee    MRN: [id number]           Patient Information     Date Of Birth          1965        Visit Information        Provider Department      5/30/2018 4:45 PM Tom De La Torre MD [facility]        Today's Diagnoses     Impingement syndrome of right shoulder    -  1       Follow-ups after your visit        Your next 10 appointments already scheduled     Jun 01, 2018 11:30 AM CDT   MR SHOULDER RIGHT W/O CONTRAST with UCMR1   [facility] [place] [facility] ([facility])    [address] [address] [phone]           Take your medicines as usual, unless your doctor tells you not to. Bring a list of your current medicines to your exam (including vitamins, minerals and over-the-counter drugs). Also bring the results of similar scans you may have had.  Please remove any body piercings and hair extensions before you arrive.  Follow your doctor s orders. If you do not, we may have to postpone your exam.  You may or may not receive IV contrast for this exam pending the discretion of the Radiologist.  You do not need to do anything special to prepare.  The MRI machine uses a strong magnet. Please wear clothes without metal (snaps, zippers). A sweatsuit works well, or we may give you a hospital gown.   **IMPORTANT** THE INSTRUCTIONS BELOW ARE ONLY FOR THOSE PATIENTS WHO HAVE BEEN PRESCRIBED SEDATION OR GENERAL ANESTHESIA DURING THEIR MRI PROCEDURE:  IF YOUR DOCTOR PRESCRIBED ORAL SEDATION (take medicine to help you relax during your exam):   You must get the medicine from your doctor (oral medication) before you arrive. Bring the medicine to the exam. Do not take it at home. You ll be told when to take it upon arriving for your exam.   Arrive one hour early. Bring someone who can take you home after the test. Your medicine will make you sleepy. After the exam, you may not drive,  take a bus or take a taxi by yourself.  IF YOUR DOCTOR PRESCRIBED IV SEDATION:   Arrive one hour early. Bring someone who can take you home after the test. Your medicine will make you sleepy. After the exam, you may not drive, take a bus or take a taxi by yourself.   No eating 6 hours before your exam. You may have clear liquids up until 4 hours before your exam. (Clear liquids include water, clear tea, black coffee and fruit juice without pulp.)  IF YOUR DOCTOR PRESCRIBED ANESTHESIA (be asleep for your exam):   Arrive 1 1/2 hours early. Bring someone who can take you home after the test. You may not drive, take a bus or take a taxi by yourself.   No eating 8 hours before your exam. You may have clear liquids up until 4 hours before your exam. (Clear liquids include water, clear tea, black coffee and fruit juice without pulp.)   You will spend four to five hours in the recovery room.  Please call the Imaging Department at your exam site with any questions.            Jun 05, 2018  4:30 PM CDT   SHALOM Hand with Delilah Rahman OT   [facility] ([facility])    [address] [place] [address]   [phone]            Jun 12, 2018  4:30 PM CDT   SHALOM Hand with CARLIE Joy   [facility] ([facility])    [address] [place] [address]   [phone]            Jun 18, 2018  8:50 AM CDT   (Arrive by 8:35 AM)   NEW SHOULDER with Juan José Spaulding MD   [facility] ([facility])    [address] [place] [address]   [phone]            Jun 21, 2018  4:00 PM CDT   SHALOM Hand with CARLIE Joy   [facility] ([facility])    [address] [place] [address]   [phone]            Jun 28, 2018  4:00 PM CDT   (Arrive by 3:45 PM)   RETURN HAND with  Carlitos Woodard MD   [facility] ([facility])    [address] [facility]  [address] [facility] [id number] [phone]            2018  5:00 PM CDT   SHALOM Hand with Delilah Rahman OT   [facility] ([facility])    [address] [id number] [phone]              Future tests that were ordered for you today     Open Future Orders        Priority Expected Expires Ordered    MR Shoulder Right w/o Contrast Routine  2019            Who to contact     Please call your clinic at [phone] to:    Ask questions about your health    Make or cancel appointments    Discuss your medicines    Learn about your test results    Speak to your doctor            Additional Information About Your Visit        [email or web address] Reminder Information     [email or web address] is an electronic gateway that provides easy, online access to your medical records. With [email or web address], you can request a clinic appointment, read your test results, renew a prescription or communicate with your care team.     To sign up for [email or web address] visit the website at [email or web address]   You will be asked to enter the access code listed below, as well as some personal information. Please follow the directions to create your username and password.     Your access code is: [email or web address]  Expires: 2018  5:59 AM     Your access code will  in 90 days. If you need help or a new code, please contact your [facility] or call [phone] for assistance.        Care EveryWhere ID     This is your Care EveryWhere ID. This could be used by other organizations to access your [facility] medical records  [id number]         Blood Pressure from Last 3 Encounters:   17 128/83   17 156/85    Weight from Last 3 Encounters:   17 212 lb (96.2 kg)   17 212 lb (96.2 kg)   17 214 lb (97.1 kg)              We  Performed the Following     [facility] WORKMAN'S COMP VISIT        Primary Care Provider Fax #    Physician No Ref-Primary [phone]       No address on file        Equal Access to Services     DANIEL HANSEN : Hadii aad ku hadlindseyolya Malloy, jerman lamarjoanha, pola poole, sandra catalan laPatynafisa vela. So [facility] [phone].    ATENCIÓN: Si habla español, tiene a otero disposición servicios gratuitos de asistencia lingüística. Llame al [phone].    We comply with applicable federal civil rights laws and [place] laws. We do not discriminate on the basis of race, color, national origin, age, disability, sex, sexual orientation, or gender identity.            Thank you!     Thank you for choosing [facility]  for your care. Our goal is always to provide you with excellent care. Hearing back from our patients is one way we can continue to improve our services. Please take a few minutes to complete the written survey that you may receive in the mail after your visit with us. Thank you!             Your Updated Medication List - Protect others around you: Learn how to safely use, store and throw away your medicines at www.disposemymeds.org.          This list is accurate as of 5/30/18  5:01 PM.  Always use your most recent med list.                   Brand Name Dispense Instructions for use Diagnosis    TYLENOL PO      Take 320 mg by mouth

## 2018-05-30 NOTE — PROGRESS NOTES
Sports Medicine Clinic Visit    PCP: No Ref-Primary, Physician    Justice Yee is a 52 year old male who is seen  as self referral presenting with right shoulder pain.     Injury: He states that he fell from a 6 foot ladder on 11/7/17.    Location of Pain: right shoulder  Duration of Pain: 7 month(s)  Pain is better with: Tylenol  Pain is worse with: Motion   Additional Features: Right wrist injury and surgery from same injury  Treatment so far consists of: Tylenol  Prior History of related problems:     There were no vitals taken for this visit.         PMH:  Active problem list:  Patient Active Problem List   Diagnosis     Wrist stiffness, right     Stiffness of finger joint, right     Other closed intra-articular fracture of distal end of right radius with delayed healing, subsequent encounter     Right wrist pain     Radial styloid tenosynovitis       FH:  Family History   Problem Relation Age of Onset     DIABETES Mother        SH:  Social History     Social History     Marital status:      Spouse name: N/A     Number of children: N/A     Years of education: N/A     Occupational History     Not on file.     Social History Main Topics     Smoking status: Former Smoker     Packs/day: 0.50     Years: 7.00     Quit date: 11/13/1995     Smokeless tobacco: Never Used     Alcohol use No     Drug use: No     Sexual activity: Not on file     Other Topics Concern     Not on file     Social History Narrative       MEDS:  See EMR, reviewed  ALL:  See EMR, reviewed    REVIEW OF SYSTEMS:  CONSTITUTIONAL:NEGATIVE for fever, chills, change in weight  INTEGUMENTARY/SKIN: NEGATIVE for worrisome rashes, moles or lesions  EYES: NEGATIVE for vision changes or irritation  ENT/MOUTH: NEGATIVE for ear, mouth and throat problems  RESP:NEGATIVE for significant cough or SOB  BREAST: NEGATIVE for masses, tenderness or discharge  CV: NEGATIVE for chest pain, palpitations or peripheral edema  GI: NEGATIVE for nausea, abdominal  pain, heartburn, or change in bowel habits  :NEGATIVE for frequency, dysuria, or hematuria  :NEGATIVE for frequency, dysuria, or hematuria  NEURO: NEGATIVE for weakness, dizziness or paresthesias  ENDOCRINE: NEGATIVE for temperature intolerance, skin/hair changes  HEME/ALLERGY/IMMUNE: NEGATIVE for bleeding problems  PSYCHIATRIC: NEGATIVE for changes in mood or affect        4 views right shoulder radiographs 5/30/2018 4:58 PM    History: Jasson Views; Right shoulder pain, unspecified chronicity    Comparison: None available.    Findings:    AP, Grashey, transscapular Y, axillary  views of the right shoulder  were obtained.     No acute osseous abnormality.  Glenohumeral and acromioclavicular  joints are congruent.    Mild to moderate degenerative changes of the acromioclavicular joint.   No substantial degenerative change of the glenohumeral joint.    Soft tissue is unremarkable.  The visualized lung is clear.             Impression             Impression:  1. No acute osseous abnormality.  2. Mild to moderate degenerative changes of acromioclavicular joint.                SUBJECTIVE:  This 52-year-old male is self-referred to discuss shoulder pain and impingement complaints.  He notes specific discomfort with abducting his shoulder to 90 degrees and it also bothers him with forward flexion.  It can be uncomfortable putting on clothing.  He relates the onset of discomfort to a particular injury that occurred 11/07/2017.  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  He fell onto the right side of his body and presented to the emergency room with acute right-sided wrist fracture.  He did have some lateral right-sided shoulder pain but described it as minimal.  He indicates that he really did not notice significant shoulder limitations or pain until he had completed his wrist surgery.  However, the shoulder has given  him consistent trouble since that time.  He has not had any imaging studies or evaluations regarding it.  He denies past history of shoulder injuries.      OBJECTIVE:  He points to the anterior and lateral areas of the shoulder as areas that can be uncomfortable.  I do not see signs of a frozen shoulder today.  In the supine position with both shoulders relaxed on the table, I can achieve a symmetrical amount of passive range of motion to both shoulders of about 160 degrees of internal to external rotation arc, symmetrical with his bilateral shoulders.  In the upright position, overhead impingement signs are positive on the right.  He will actively forward flex to about 90 degrees bilaterally and forward flex about 120 degrees on the right before he is limited by discomfort.  I am able to achieve slightly increased passive range of motion beyond what he is able to accomplish actively before he is limited by pain.  He is tender over the anterior cuff, more over its lateral aspect at the infraspinatus footprint.  He is nontender over the AC joint and a crossed adduction test is negative.  He is nontender over the biceps tendon.  There is no scapular winging.  He is nontender over the scapula.  There is no swelling in the upper extremity.  Skin is intact.  Distal pulses are normal.  An Huntington's test is negative on the right.  I do not see a biceps muscle deformity.  Distal pulses are intact at the wrist overlying skin appears normal.  He is appropriate in conversation and affect.      IMAGING:  An x-ray of the shoulder shows some mild AC joint degenerative change with relative loss of joint space and some small peripheral spurs.  There is no significant glenohumeral DJD seen.  I do not see a Hill-Sachs deformity or a bony Bankart lesion on the x-ray and there are no signs of fracture noted.      ASSESSMENT:  Persistent right-sided shoulder impingement symptoms after acute fall 6 months ago.      PLAN:  He was to see  an orthopedic surgeon according to his directives from worker's compensation today.  However, he did not have adequate imaging studies to see the surgeon so an MRI of this the shoulder will be obtained in addition to today's x-ray and he will see Dr. Spaulding in consultation regarding his ongoing shoulder pain.       ADDENDUM:  Strength testing of the bilateral shoulders is completed today including 5 out of 5 deltoid, supraspinatus, infraspinatus and subscapularis strength testing without breakaway weakness noted.  It seems symmetrical at 5 out of 5 bilaterally.  External rotation testing was uncomfortable to complete and tender to the touch on physical exam at the rotator cuff footprint.

## 2018-05-30 NOTE — LETTER
5/30/2018      RE: Justice Yee  [address]  [place] [address]       Sports Medicine Clinic Visit    PCP: No Ref-Primary, Physician    Justice Yee is a 52 year old male who is seen  as self referral presenting with right shoulder pain.     Injury: He states that he fell from a 6 foot ladder on 11/7/17.    Location of Pain: right shoulder  Duration of Pain: 7 month(s)  Pain is better with: Tylenol  Pain is worse with: Motion   Additional Features: Right wrist injury and surgery from same injury  Treatment so far consists of: Tylenol  Prior History of related problems:     There were no vitals taken for this visit.         PMH:  Active problem list:  Patient Active Problem List   Diagnosis     Wrist stiffness, right     Stiffness of finger joint, right     Other closed intra-articular fracture of distal end of right radius with delayed healing, subsequent encounter     Right wrist pain     Radial styloid tenosynovitis       FH:  Family History   Problem Relation Age of Onset     DIABETES Mother        SH:  Social History     Social History     Marital status:      Spouse name: N/A     Number of children: N/A     Years of education: N/A     Occupational History     Not on file.     Social History Main Topics     Smoking status: Former Smoker     Packs/day: 0.50     Years: 7.00     Quit date: 11/13/1995     Smokeless tobacco: Never Used     Alcohol use No     Drug use: No     Sexual activity: Not on file     Other Topics Concern     Not on file     Social History Narrative       MEDS:  See EMR, reviewed  ALL:  See EMR, reviewed    REVIEW OF SYSTEMS:  CONSTITUTIONAL:NEGATIVE for fever, chills, change in weight  INTEGUMENTARY/SKIN: NEGATIVE for worrisome rashes, moles or lesions  EYES: NEGATIVE for vision changes or irritation  ENT/MOUTH: NEGATIVE for ear, mouth and throat problems  RESP:NEGATIVE for significant cough or SOB  BREAST: NEGATIVE for masses, tenderness or discharge  CV: NEGATIVE  for chest pain, palpitations or peripheral edema  GI: NEGATIVE for nausea, abdominal pain, heartburn, or change in bowel habits  :NEGATIVE for frequency, dysuria, or hematuria  :NEGATIVE for frequency, dysuria, or hematuria  NEURO: NEGATIVE for weakness, dizziness or paresthesias  ENDOCRINE: NEGATIVE for temperature intolerance, skin/hair changes  HEME/ALLERGY/IMMUNE: NEGATIVE for bleeding problems  PSYCHIATRIC: NEGATIVE for changes in mood or affect        4 views right shoulder radiographs 5/30/2018 4:58 PM    History: Jasson Views; Right shoulder pain, unspecified chronicity    Comparison: None available.    Findings:    AP, Grashey, transscapular Y, axillary  views of the right shoulder  were obtained.     No acute osseous abnormality.  Glenohumeral and acromioclavicular  joints are congruent.    Mild to moderate degenerative changes of the acromioclavicular joint.   No substantial degenerative change of the glenohumeral joint.    Soft tissue is unremarkable.  The visualized lung is clear.             Impression             Impression:  1. No acute osseous abnormality.  2. Mild to moderate degenerative changes of acromioclavicular joint.                SUBJECTIVE:  This 52-year-old male is self-referred to discuss shoulder pain and impingement complaints.  He notes specific discomfort with abducting his shoulder to 90 degrees and it also bothers him with forward flexion.  It can be uncomfortable putting on clothing.  He relates the onset of discomfort to a particular injury that occurred 11/07/2017.  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  He fell onto the right side of his body and presented to the emergency room with acute right-sided wrist fracture.  He did have some lateral right-sided shoulder pain but described it as minimal.  He indicates that he really did not notice significant shoulder  limitations or pain until he had completed his wrist surgery.  However, the shoulder has given him consistent trouble since that time.  He has not had any imaging studies or evaluations regarding it.  He denies past history of shoulder injuries.      OBJECTIVE:  He points to the anterior and lateral areas of the shoulder as areas that can be uncomfortable.  I do not see signs of a frozen shoulder today.  In the supine position with both shoulders relaxed on the table, I can achieve a symmetrical amount of passive range of motion to both shoulders of about 160 degrees of internal to external rotation arc, symmetrical with his bilateral shoulders.  In the upright position, overhead impingement signs are positive on the right.  He will actively forward flex to about 90 degrees bilaterally and forward flex about 120 degrees on the right before he is limited by discomfort.  I am able to achieve slightly increased passive range of motion beyond what he is able to accomplish actively before he is limited by pain.  He is tender over the anterior cuff, more over its lateral aspect at the infraspinatus footprint.  He is nontender over the AC joint and a crossed adduction test is negative.  He is nontender over the biceps tendon.  There is no scapular winging.  He is nontender over the scapula.  There is no swelling in the upper extremity.  Skin is intact.  Distal pulses are normal.  An Wilkin's test is negative on the right.  I do not see a biceps muscle deformity.  Distal pulses are intact at the wrist overlying skin appears normal.  He is appropriate in conversation and affect.      IMAGING:  An x-ray of the shoulder shows some mild AC joint degenerative change with relative loss of joint space and some small peripheral spurs.  There is no significant glenohumeral DJD seen.  I do not see a Hill-Sachs deformity or a bony Bankart lesion on the x-ray and there are no signs of fracture noted.      ASSESSMENT:  Persistent  right-sided shoulder impingement symptoms after acute fall 6 months ago.      PLAN:  He was to see an orthopedic surgeon according to his directives from worker's compensation today.  However, he did not have adequate imaging studies to see the surgeon so an MRI of this the shoulder will be obtained in addition to today's x-ray and he will see Dr. Spaulding in consultation regarding his ongoing shoulder pain.       ADDENDUM:  Strength testing of the bilateral shoulders is completed today including 5 out of 5 deltoid, supraspinatus, infraspinatus and subscapularis strength testing without breakaway weakness noted.  It seems symmetrical at 5 out of 5 bilaterally.  External rotation testing was uncomfortable to complete and tender to the touch on physical exam at the rotator cuff footprint.                       Tom De La Torre MD

## 2018-05-30 NOTE — LETTER
Date:June 4, 2018      Patient was self referred, no letter generated. Do not send.        [facility] Information

## 2018-05-30 NOTE — LETTER
5/30/2018      RE: Justice Yee  [address]  [facility] [address]       Sports Medicine Clinic Visit    PCP: No Ref-Primary, Physician    Justice Yee is a 52 year old male who is seen  as self referral presenting with right shoulder pain.     Injury: He states that he fell from a 6 foot ladder on 11/7/17.    Location of Pain: right shoulder  Duration of Pain: 7 month(s)  Pain is better with: Tylenol  Pain is worse with: Motion   Additional Features: Right wrist injury and surgery from same injury  Treatment so far consists of: Tylenol  Prior History of related problems:     There were no vitals taken for this visit.         PMH:  Active problem list:  Patient Active Problem List   Diagnosis     Wrist stiffness, right     Stiffness of finger joint, right     Other closed intra-articular fracture of distal end of right radius with delayed healing, subsequent encounter     Right wrist pain     Radial styloid tenosynovitis       FH:  Family History   Problem Relation Age of Onset     DIABETES Mother        SH:  Social History     Social History     Marital status:      Spouse name: N/A     Number of children: N/A     Years of education: N/A     Occupational History     Not on file.     Social History Main Topics     Smoking status: Former Smoker     Packs/day: 0.50     Years: 7.00     Quit date: 11/13/1995     Smokeless tobacco: Never Used     Alcohol use No     Drug use: No     Sexual activity: Not on file     Other Topics Concern     Not on file     Social History Narrative       MEDS:  See EMR, reviewed  ALL:  See EMR, reviewed    REVIEW OF SYSTEMS:  CONSTITUTIONAL:NEGATIVE for fever, chills, change in weight  INTEGUMENTARY/SKIN: NEGATIVE for worrisome rashes, moles or lesions  EYES: NEGATIVE for vision changes or irritation  ENT/MOUTH: NEGATIVE for ear, mouth and throat problems  RESP:NEGATIVE for significant cough or SOB  BREAST: NEGATIVE for masses, tenderness or discharge  CV: NEGATIVE  for chest pain, palpitations or peripheral edema  GI: NEGATIVE for nausea, abdominal pain, heartburn, or change in bowel habits  :NEGATIVE for frequency, dysuria, or hematuria  :NEGATIVE for frequency, dysuria, or hematuria  NEURO: NEGATIVE for weakness, dizziness or paresthesias  ENDOCRINE: NEGATIVE for temperature intolerance, skin/hair changes  HEME/ALLERGY/IMMUNE: NEGATIVE for bleeding problems  PSYCHIATRIC: NEGATIVE for changes in mood or affect        4 views right shoulder radiographs 5/30/2018 4:58 PM    History: Jasson Views; Right shoulder pain, unspecified chronicity    Comparison: None available.    Findings:    AP, Grashey, transscapular Y, axillary  views of the right shoulder  were obtained.     No acute osseous abnormality.  Glenohumeral and acromioclavicular  joints are congruent.    Mild to moderate degenerative changes of the acromioclavicular joint.   No substantial degenerative change of the glenohumeral joint.    Soft tissue is unremarkable.  The visualized lung is clear.             Impression             Impression:  1. No acute osseous abnormality.  2. Mild to moderate degenerative changes of acromioclavicular joint.                SUBJECTIVE:  This 52-year-old male is self-referred to discuss shoulder pain and impingement complaints.  He notes specific discomfort with abducting his shoulder to 90 degrees and it also bothers him with forward flexion.  It can be uncomfortable putting on clothing.  He relates the onset of discomfort to a particular injury that occurred 11/07/2017.  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  He fell onto the right side of his body and presented to the emergency room with acute right-sided wrist fracture.  He did have some lateral right-sided shoulder pain but described it as minimal.  He indicates that he really did not notice significant shoulder  limitations or pain until he had completed his wrist surgery.  However, the shoulder has given him consistent trouble since that time.  He has not had any imaging studies or evaluations regarding it.  He denies past history of shoulder injuries.      OBJECTIVE:  He points to the anterior and lateral areas of the shoulder as areas that can be uncomfortable.  I do not see signs of a frozen shoulder today.  In the supine position with both shoulders relaxed on the table, I can achieve a symmetrical amount of passive range of motion to both shoulders of about 160 degrees of internal to external rotation arc, symmetrical with his bilateral shoulders.  In the upright position, overhead impingement signs are positive on the right.  He will actively forward flex to about 90 degrees bilaterally and forward flex about 120 degrees on the right before he is limited by discomfort.  I am able to achieve slightly increased passive range of motion beyond what he is able to accomplish actively before he is limited by pain.  He is tender over the anterior cuff, more over its lateral aspect at the infraspinatus footprint.  He is nontender over the AC joint and a crossed adduction test is negative.  He is nontender over the biceps tendon.  There is no scapular winging.  He is nontender over the scapula.  There is no swelling in the upper extremity.  Skin is intact.  Distal pulses are normal.  An Barceloneta's test is negative on the right.  I do not see a biceps muscle deformity.  Distal pulses are intact at the wrist overlying skin appears normal.  He is appropriate in conversation and affect.      IMAGING:  An x-ray of the shoulder shows some mild AC joint degenerative change with relative loss of joint space and some small peripheral spurs.  There is no significant glenohumeral DJD seen.  I do not see a Hill-Sachs deformity or a bony Bankart lesion on the x-ray and there are no signs of fracture noted.      ASSESSMENT:  Persistent  right-sided shoulder impingement symptoms after acute fall 6 months ago.      PLAN:  He was to see an orthopedic surgeon according to his directives from worker's compensation today.  However, he did not have adequate imaging studies to see the surgeon so an MRI of this the shoulder will be obtained in addition to today's x-ray and he will see Dr. Spaulding in consultation regarding his ongoing shoulder pain.       ADDENDUM:  Strength testing of the bilateral shoulders is completed today including 5 out of 5 deltoid, supraspinatus, infraspinatus and subscapularis strength testing without breakaway weakness noted.  It seems symmetrical at 5 out of 5 bilaterally.  External rotation testing was uncomfortable to complete and tender to the touch on physical exam at the rotator cuff footprint.                       Tom De La Torre MD

## 2018-06-01 ENCOUNTER — RADIANT APPOINTMENT (OUTPATIENT)
Dept: MRI IMAGING | Facility: CLINIC | Age: 53
End: 2018-06-01
Attending: FAMILY MEDICINE
Payer: OTHER MISCELLANEOUS

## 2018-06-01 DIAGNOSIS — M75.41 IMPINGEMENT SYNDROME OF RIGHT SHOULDER: ICD-10-CM

## 2018-06-12 ENCOUNTER — THERAPY VISIT (OUTPATIENT)
Dept: OCCUPATIONAL THERAPY | Facility: CLINIC | Age: 53
End: 2018-06-12
Payer: OTHER MISCELLANEOUS

## 2018-06-12 DIAGNOSIS — M25.641 STIFFNESS OF FINGER JOINT, RIGHT: ICD-10-CM

## 2018-06-12 DIAGNOSIS — M25.531 RIGHT WRIST PAIN: Primary | ICD-10-CM

## 2018-06-12 DIAGNOSIS — M25.631 WRIST STIFFNESS, RIGHT: ICD-10-CM

## 2018-06-12 DIAGNOSIS — M65.4 RADIAL STYLOID TENOSYNOVITIS: ICD-10-CM

## 2018-06-12 PROCEDURE — 97110 THERAPEUTIC EXERCISES: CPT | Mod: GO | Performed by: OCCUPATIONAL THERAPIST

## 2018-06-12 PROCEDURE — 97035 APP MDLTY 1+ULTRASOUND EA 15: CPT | Mod: GO | Performed by: OCCUPATIONAL THERAPIST

## 2018-06-12 PROCEDURE — 97112 NEUROMUSCULAR REEDUCATION: CPT | Mod: GO | Performed by: OCCUPATIONAL THERAPIST

## 2018-06-12 NOTE — MR AVS SNAPSHOT
After Visit Summary   6/12/2018    Justice Yee    MRN: [id number]           Patient Information     Date Of Birth          1965        Visit Information        Provider Department      6/12/2018 4:30 PM Malou Lewis OTR [facility]        Today's Diagnoses     Right wrist pain    -  1    Radial styloid tenosynovitis        Wrist stiffness, right        Stiffness of finger joint, right           Follow-ups after your visit        Your next 10 appointments already scheduled     Jun 18, 2018  8:50 AM CDT   (Arrive by 8:35 AM)   NEW SHOULDER with Juan José Spaulding MD   [facility] ([facility])    [address] [place] [id number]   [phone]            Jun 21, 2018  4:00 PM CDT   [facility]ALOM Hand with CARLIE Joy   [facility] ([facility])    [address] [place] [id number]   [phone]            Jun 28, 2018  4:00 PM CDT   (Arrive by 3:45 PM)   RETURN HAND with Carlitos Woodard MD   [facility] ([facility] [place])    [address] [place] [id number] [phone]            Jun 28, 2018  5:00 PM CDT   SHALOM Hand with Delilah Rahman OT   [facility] ([facility])    [address] [place] [id number] [phone]              Who to contact     If you have questions or need follow up information about today's clinic visit or your schedule please contact [facility] directly at [phone].  Normal or non-critical lab and imaging results will be communicated to you by MyChart, letter or phone within 4 business days after the clinic has received the results. If you do not hear from us within 7 days, please contact the clinic through MyChart or phone. If you have a critical or abnormal lab result, we will notify you  by phone as soon as possible.  Submit refill requests through [employer] or call your pharmacy and they will forward the refill request to us. Please allow 3 business days for your refill to be completed.          Additional Information About Your Visit        Care EveryWhere ID     This is your Care EveryWhere ID. This could be used by other organizations to access your [place] medical records  [id number]         Blood Pressure from Last 3 Encounters:   11/14/17 128/83   11/13/17 156/85    Weight from Last 3 Encounters:   11/14/17 96.2 kg (212 lb)   11/13/17 96.2 kg (212 lb)   11/09/17 97.1 kg (214 lb)              We Performed the Following     NEUROMUSCULAR RE-EDUCATION     THERAPEUTIC EXERCISES     ULTRASOUND THERAPY        Primary Care Provider Fax #    Physician No Ref-Primary [phone]       No address on file        Equal Access to Services     DANIEL HANSEN : Zaid Malloy, jerman coto, pola kaalmachelly poole, sandra waters . So [facility] [phone].    ATENCIÓN: Si habla español, tiene a otero disposición servicios gratuitos de asistencia lingüística. Siobhan al [phone].    We comply with applicable federal civil rights laws and [place] laws. We do not discriminate on the basis of race, color, national origin, age, disability, sex, sexual orientation, or gender identity.            Thank you!     Thank you for choosing [facility]  for your care. Our goal is always to provide you with excellent care. Hearing back from our patients is one way we can continue to improve our services. Please take a few minutes to complete the written survey that you may receive in the mail after your visit with us. Thank you!             Your Updated Medication List - Protect others around you: Learn how to safely use, store and throw away your medicines at www.disposemymeds.org.          This list is accurate as of 6/12/18  5:14 PM.  Always use your most recent med  list.                   Brand Name Dispense Instructions for use Diagnosis    TYLENOL PO      Take 320 mg by mouth

## 2018-06-12 NOTE — PROGRESS NOTES
SOAP note objective information for 6/12/2018.    Please refer to the daily flowsheet for treatment today, total treatment time and time spent performing 1:1 timed codes.       Diagnosis: closed intra-articular fracture of distal end of right radius with routine healing   DOI: 11/7/17  DOS: 11/14/17  Procedure: Open Reduction Internal Fixation of Right Distal Radius Fracture  Post: 6 months  Referring MD: Carlitos Woodard MD        Objective:  Pain Level Report  VAS(0-10) 12/8/2017 2/2/18 2/13/18 2/27/18 3/20/18 5/3/18 5/22/18 5/29/18    At Rest: 4/10 5/10 1-2/10 0/10 0/10 0/10 0/10  0/20   With Use: 5/10 8/10 2-4/10 6/10 pushing up from hands 5/10 doing things 5/10 with motion, mario alberto wrist deviation moves 5/10 with motion, more on the ulnar side, radial side with push and turn 5/10 with motion, can cause a quick pain, causes me to  jump 5/10     Report of Pain:  Location:  Wrist and shoulder  Pain Quality:sore and can be sharp, can be more central wrist.   Frequency: with use and weight bearing  Pain is worst:  Pushing up and twisting from the hand, still pain and prevents from using hand to push up  Exacerbated by:  Weight bearing, twisting, and sideways lifting  Relieved by:  Rest      Wrist JPS ROM / Value   (Joint Position Sense)  3/20/2018   3/20/2018   5/29/18    Affected wrist right Absolute value  Absolute Value   Trial 1 18 -2 20 19   Trial 2 10 -10 20 16   Mean / Average  -6           Radial Wrist: Provocative Tests  Radial Wrist Zone: Provocative Tests: 1/16/2018 2/13/18 2/27/18 3/20/18 5/3 5/29/18   Pain Report:  - none    + mild    ++ moderate    +++ severe  Right right right right right right   Finkelstein's Test ++ + ++ + + +   Dequervain's: APL test + + + + + +   EPB test:  + + - + + +   Intersection: APL/EPB & ECRB/L test - - -   -   ECU stress test:  ++ + - - + +   ECU: supination overpressure + + + + +    Ulnar wrist/TFCC     + for TFCC grind/appoximation  ++   Ulnar fovea area    Pain +  +        ROM:  Pain Report:  - none    + mild    ++ moderate    +++ severe   Wrist  12/8/17 2/13/18 5/3 5/22 5/29/18 6/12/18   AROM (PROM) R Right   Right (AM measure) R R   Extension 15 50 60 55 55 60   Flexion 20 45 55 50 50 50   RD 0 10 20+ radial wrist      UD 0 35 30+      Supination 45 70    WFL 6/10 pain    Pronation 90 90    WFL 0/10     ROM:  Thumb 2018 2018 2/27/18 3/20/18 5/29   AROM(PROM) Right Left right right right   MP /60 /70 x/60 /62    IP /45 /70 /52 /52    RAbd 54+ 60 58 58 58   PAbd 54+ 55 55+ 55+ 55   Retropulsion        Kapandji Opposition Scale (0-10/10)   9/10 9.5 9.75      Fingers  Extension/Flexion, AROM(PROM)  Date:   12/13 1/2/18 1/8 12/27/18 3/20/18 5/29   Side:  Right        AROM(PROM)           Index:  MP               PIP               DIP  FERRER:  75  95  50 -14/72  0/84  0/35  177 0/82  0/92  0/40 0/82  0/105  0/45  232      Long:   MP               PIP               DIP  FERRER:  71  97  61 0/80  0/95  0/50  225  0/85  0/110  0/65  260 /87  /107  /68  262 /85  /115  /70    Ring:    MP                PIP                DIP  FERRER:  70  97  55 0/77  0/97  0/55  229  0/82  0/105  0/65  212      Small:   MP                PIP                DIP  FERRER:  75  95  58 -7/80  -4/94  0/58  221  0/85  0/100  0/70  255     Thumb:  MP                IP                RABD                PABD  25/61  +/30  45  55 -19/55  +/40  51  53           STRENGTH:   (Measured in pounds)     2018  3/20/18  5/3  5/22 2018      Trials Right Left Right Left right left right right left   1  2  3  Av 81 47  50  56  52 85 53  58  50  54 88 60 61  With Wrist Restore: 65 88       3 pt Pinch 2018  3/20/18 5/3    Trials Right Left Right right    1  2  3  Avg: 10+ at radial wrist  14 16      Lateral Pinch 2018  3/20/18 5/3      Trials Right Left Right right      1  2  3  Av 19 17 16            Home Program:   Pen roll  Icing to ECU and avoid painful motions.   Continue wearing thumb  spica for Dequervains as needed, and ECU tendonitis  Continue thumb and Wrist ROM exercises  DTM AROM and wall ball wt bearing, on fist/no pain provocation  DTM isometrics no pain, keep wrist is neutral position  5/3/2018  Counter  with dowel to fire FCR  5/22/2018  Marbles in wiffle ball, with RUE  Pt will have wife do deep 1st DC massage  5/29/2018  Fit with Wrist Restore, pt noted immediate pain reduction with wt bearing, will wear at work and day at home. And report on how this works to reduce pain and increase function  6/12: begin wrist stability program           Door frame pull backs           Weight carry     Next Visit:  Progress strengthening with focus on wrist stability progressing slowly  Progress to water jug wrist work, if possible

## 2018-06-13 ENCOUNTER — PRE VISIT (OUTPATIENT)
Dept: ORTHOPEDICS | Facility: CLINIC | Age: 53
End: 2018-06-13

## 2018-06-14 NOTE — TELEPHONE ENCOUNTER
FUTURE VISIT INFORMATION      FUTURE VISIT INFORMATION:    Date: 6/18    Time: 8:50    Location: [facility]  REFERRAL INFORMATION:    Referring provider:  Tom De La Torre    Referring providers clinic:   [facility]    Reason for visit/diagnosis  r shoulder pain    RECORDS REQUESTED FROM:       Clinic name Comments Records Status Imaging Status   [facility] sports [facility]  internal internal                                   RECORDS STATUS

## 2018-06-18 ENCOUNTER — OFFICE VISIT (OUTPATIENT)
Dept: ORTHOPEDICS | Facility: CLINIC | Age: 53
End: 2018-06-18
Payer: OTHER MISCELLANEOUS

## 2018-06-18 VITALS — BODY MASS INDEX: 30.48 KG/M2 | WEIGHT: 225 LBS | HEIGHT: 72 IN

## 2018-06-18 DIAGNOSIS — M75.21 BICEPS TENDONITIS, RIGHT: Primary | ICD-10-CM

## 2018-06-18 NOTE — NURSING NOTE
"Reason For Visit:   Chief Complaint   Patient presents with     Consult     Right shoulder pain ref by Dr. De La Torre       PCP: No Ref-Primary, Physician    ?  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 Fell off a ladder  Date of surgery: None  Smoker: No      Right hand dominant    SANE score  Affected shoulder: Right  Right shoulder SANE: 70  Left shoulder SANE: 100    Dynamometer  Forward Elevation:  R = 11 pounds,  L = 20 pounds  External Rotation:   R = 19 pounds,  L = 22 pounds    Ht 1.829 m (6' 0.01\")  Wt 102.1 kg (225 lb)  BMI 30.51 kg/m2      Pain Assessment  Patient Currently in Pain: Yes  0-10 Pain Scale: 5  Primary Pain Location: Shoulder  Pain Orientation: Right  Pain Descriptors: Sharp  Aggravating Factors: Movement      Lindsey Valentino LPN      "

## 2018-06-18 NOTE — MR AVS SNAPSHOT
After Visit Summary   6/18/2018    Justice Yee    MRN: [id number]           Patient Information     Date Of Birth          1965        Visit Information        Provider Department      6/18/2018 8:50 AM Juan José Spaulding MD [facility]        Today's Diagnoses     Biceps tendonitis, right    -  1       Follow-ups after your visit        Additional Services     [facility] PT, HAND, AND CHIROPRACTIC REFERRAL       Cuff, Deltoid and parascapular strengthening and range of motion.    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    Progress as tolerated to strengthening.                  Your next 10 appointments already scheduled     Jun 21, 2018  4:00 PM CDT   SHALOM Hand with CARLIE Joy   [facility] ([facility])    [address] [place] [phone] [phone]            Jun 28, 2018  4:00 PM CDT   (Arrive by 3:45 PM)   RETURN HAND with Carlitos Woodard MD   [facility] ([facility])    [address] [place] [id number] [phone]            Jun 28, 2018  5:00 PM CDT   SHALOM Hand with Delilah Rahman OT   [facility] ([facility])    [address] [place] [phone]            Jul 02, 2018 12:50 PM CDT   SHALOM Extremity with Bridget Almaguer PT   [facility] ([facility])    [address] [place] [phone] [phone]            Jul 18, 2018  1:30 PM CDT   SHALOM Extremity with Bridget Almaguer PT   [facility] ([facility])    [address] [place] [phone]              Who to contact     Please call your clinic at [phone] to:    Ask questions about your health    Make or cancel appointments    Discuss your  "medicines    Learn about your test results    Speak to your doctor            Additional Information About Your Visit        Care EveryWhere ID     This is your Care EveryWhere ID. This could be used by other organizations to access your [place] medical records  [id number]        Your Vitals Were     Height BMI (Body Mass Index)                1.829 m (6' 0.01\") 30.51 kg/m2           Blood Pressure from Last 3 Encounters:   11/14/17 128/83   11/13/17 156/85    Weight from Last 3 Encounters:   06/18/18 102.1 kg (225 lb)   11/14/17 96.2 kg (212 lb)   11/13/17 96.2 kg (212 lb)              We Performed the Following     [facility] PT, HAND, AND CHIROPRACTIC REFERRAL     [facility] WORKMAN'S COMP VISIT        Primary Care Provider Fax #    Physician No Ref-Primary [phone]       No address on file        Equal Access to Services     DANIEL HANSEN : Hadiqra Malloy, jerman coto, pola poole, sandra waters . So [facility] [phone].    ATENCIÓN: Si habla español, tiene a otero disposición servicios gratuitos de asistencia lingüística. Siobhan al [phone].    We comply with applicable federal civil rights laws and [place] laws. We do not discriminate on the basis of race, color, national origin, age, disability, sex, sexual orientation, or gender identity.            Thank you!     Thank you for choosing [facility]  for your care. Our goal is always to provide you with excellent care. Hearing back from our patients is one way we can continue to improve our services. Please take a few minutes to complete the written survey that you may receive in the mail after your visit with us. Thank you!             Your Updated Medication List - Protect others around you: Learn how to safely use, store and throw away your medicines at www.disposemymeds.org.          This list is accurate as of 6/18/18 11:59 PM.  Always use your most recent med list.                   Brand " Name Dispense Instructions for use Diagnosis    TYLENOL PO      Take 320 mg by mouth

## 2018-06-18 NOTE — LETTER
2018     Seen today: yes    Patient:  Justice Yee  :   1965  MRN:     [id number]  Physician: MICHEL SPAULDING Nakia may continue is previous work restrictions per occupational health with no additions.             Please call the clinic with any questions      Electronically signed by Michel Spaulding MD

## 2018-06-18 NOTE — LETTER
6/18/2018    RE: Justice Yee  [address]  [facility] [address]       REFERRING PROVIDER: Dr. 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  The patient describes an injury in which he fell off a 6 foot ladder while at work on November 6, 2017.  He sustained a comminuted intra-articular distal radius fracture which was treated with open reduction internal fixation by Dr. Woodard.  During his recovery postoperatively he noted right shoulder pain which was initially masked by the symptoms are treatable to his wrist.  He recently underwent a independent medical evaluation which recommended further evaluation and treatment of his right shoulder.  He was then seen and evaluated by Dr. De La Torre who obtained an MRI and referred him to clinic today for further evaluation discussion of treatment options    In clinic today the patient notes predominantly right lateral shoulder pain which is exacerbated by lifting activities.  He has pain that wakes him at night and interferes with sleep.  He has additional difficulty with overhead activities but is back to work on light duty.    PAST MEDICAL HISTORY: (Reviewed with the patient and in the medical record)    None    No past medical history on file.    PAST SURGICAL HISTORY: (Reviewed with the patient and in the medical record)      Past Surgical History:   Procedure Laterality Date     OPEN REDUCTION INTERNAL FIXATION WRIST Right 11/14/2017    Procedure: OPEN REDUCTION INTERNAL FIXATION WRIST;  Open Reduction Internal Fixation of Right Distal Radius Fracture;  Surgeon: Carlitos Woodard MD;  Location:  OR       MEDICATIONS: (Reviewed with the patient and in the medical record)    No home medications      ALLERGIES: (Reviewed with the patient and in the medical record)    No Known Allergies    SOCIAL  HISTORY: (Reviewed with the patient and in the medical record)  --Tobacco: None  --EtOH: None  --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 Pertinent positives are Right shoulder and right wrist pain.     EXAM:     General: Pleasant, well-developed, male   Affect: Articulates and communicates with a normal and congruent affect  Pulm: Non-labored breathing at rest  Vascular: Hand warm and well-perfused  Eyes: Extra-ocular movements are intact   Lymphatics: No swelling or lymphedema.  Neuro: Sensation is grossly intact to light touch in the Ax/MCN/M/R/U nerve distributions.  Integumentary: Normal hair and sweat patterns without evidence of skin breakdown      Right upper extremity:    SANE score: 75 (compared to 100)    Skin is intact with no lacerations, abrasions, or previous surgical incsions.    Nontender to palpation over the AC joint, subacromial space, posterior glenohumeral joint, and scapular spine    Tender to palpation over the bicipital groove    Range of Motion:     Forward Flexion: Active forward flexion to 170  without passive improvement.  Limited by pain    Abduction: Abduction to 100  actively without passive improvement limited by pain    External Rotation: External rotation to 15  without passive improvement.  No external rotation lag.    Internal Rotation: Internal rotation to the thoracic spine with normal lift off    Pain with Speed and Yergason's sign which reproduces usual  symptoms.    Positive [place]'s for usual pain symptoms    4/5 strength with resisted forward flexion in the scapular plane limited by pain    5/5 strength with resisted external rotation in adduction    Full cervical range of motion without radicular symptoms      IMAGING:    The patient's imaging from clinic today and relevant prior studies were reviewed by me in clinic.     Right shoulder radiographs obtained May 30, 2018 and right shoulder MRI obtained June 1, 2018 reviewed in clinic today and demonstrate no acute osseous abnormalities, subluxation or glenohumeral dislocation.  There is evidence of moderate AC joint arthrosis good there is suggestion of mild partial-thickness supraspinatus tendon tearing without full thickness component.  There is preservation of the glenohumeral cartilage surfaces.  There is no increased signal in the biceps tendon or subluxation out of the groove    ASSESSMENT:  1. Right partial-thickness supraspinatus tendon tearing  2. Right biceps tendinopathy  3. Right AC joint arthropathy, asymptomatic    PLAN:  We had an extensive discussion with the patient clinic today with regards to his right shoulder pain symptoms.  We reviewed his history, exam, and imaging findings which are suggestive of biceps tendinopathy as his primary pain generator.  We discussed his MRI which shows evidence of mild partial-thickness rotator cuff tearing.  We would advocate for a trial of physical therapy and an order was provided in clinic today.  We recommend he pursue a conference of nonoperative treatment program and should physical therapy fail the next step we would recommend would be a ultrasound-guided biceps tendon corticosteroid injection.  This could be followed with an additional course of physical therapy if he fails to have durable pain relief.  We anticipate that nonoperative treatment is likely to relieve his symptoms allow him to return to his activities at the level that he would like.   However if he has refractory symptoms after an extensive nonoperative treatment course for up to one year he may return on an as-needed basis to discuss surgical treatment options.      Physical therapy order provided in clinic today    May follow-up with Dr. De La Torre for ultrasound-guided corticosteroid injection of the bicipital groove if he fails to have durable relief after 3-4 month trial of physical therapy    May follow-up on an as-needed basis if he has persistent or worsening pain symptoms after 6-12 months of a conference of nonoperative treatment program.    Lj López MD 06/18/18  Orthopaedic Surgery Resident, PGY-5  Pager: [phone]    Answers for HPI/ROS submitted by the patient on 6/18/2018   General Symptoms: No  Skin Symptoms: No  HENT Symptoms: No  EYE SYMPTOMS: No  HEART SYMPTOMS: No  LUNG SYMPTOMS: No  INTESTINAL SYMPTOMS: No  URINARY SYMPTOMS: No  REPRODUCTIVE SYMPTOMS: No  SKELETAL SYMPTOMS: No  BLOOD SYMPTOMS: No  NERVOUS SYSTEM SYMPTOMS: No  MENTAL HEALTH SYMPTOMS: No  I saw the patient with the resident.  I agree with the resident note and plan of care.      Juan José Spaulding MD

## 2018-06-18 NOTE — LETTER
6/18/2018        RE: Justice Yee  [address]  [facility] [address]     Dear Colleague,    Thank you for referring your patient, Justice Yee, to the [facility] at [facility]. Please see a copy of my visit note below.    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 ***    No past medical history on file.    PAST SURGICAL HISTORY: (Reviewed with the patient and in the medical record)  1. ***    Past Surgical History:   Procedure Laterality Date     OPEN REDUCTION INTERNAL FIXATION WRIST Right 11/14/2017    Procedure: OPEN REDUCTION INTERNAL FIXATION WRIST;  Open Reduction Internal Fixation of Right Distal Radius Fracture;  Surgeon: Carlitos Woodard MD;  Location:  OR       MEDICATIONS: (Reviewed with the patient and in the medical record)  1. ***    Current Outpatient Prescriptions   Medication     Acetaminophen (TYLENOL PO)     No current facility-administered medications for this visit.        ALLERGIES: (Reviewed with the patient and in the medical record)  1. ***    SOCIAL HISTORY: (Reviewed with the patient and in the medical record)  --Tobacco: ***  --EtOH: ***  --Occupation: ***  --Avocation/Sport: ***    FAMILY HISTORY: (Reviewed with the patient and in the medical record)  -- No family history of bleeding, clotting, or difficulty with anesthesia  -- ***No family history of dislocating shoulder, knee caps, rotator cuff tears, or rheumatoid arhritis    Family History   Problem Relation Age of Onset     DIABETES Mother        REVIEW OF SYSTEMS:   - The patient's review of systems collected by on the preoperative intake form is reviewed and addended below as appropriate. Pertinent positives are ***    EXAM:     General: Pleasant, well-developed, male   Affect:  Articulates and communicates with a normal and congruent affect  Pulm: Non-labored breathing at rest  Vascular: Capillary refill <2 secs and Palpable radial pulse with a ***regular rhythm  Eyes: Extra-ocular movements are intact   Lymphatics: No swelling or lymphedema.  Neuro: Sensation is grossly intact to light touch in the Ax/MCN/M/R/U nerve distributions.  Integumentary: Normal hair and sweat patterns without evidence of skin breakdown      [side] Upper extremity:    SANE score: [Sane] (compared to [contralateral])    [inspection]    [palpation AC joint, Biceps, subacromial space]    Range of Motion:     Forward Flexion: [FF]    Abduction: [Abd]    External Rotation: [ER]    Internal Rotation: [IR]    [provocative tests]    ***5/5 , first dorsal interosseous, finger extension      IMAGING:    The patient's imaging from clinic today and relevant prior studies were reviewed by me in clinic.     [imaging and views] were ***obtained in clinic today    ASSESSMENT:  1. [assessment]    PLAN:  [plan]    Lj López MD 06/18/18  Orthopaedic Surgery Resident, PGY-5  Pager: [phone]      Again, thank you for allowing me to participate in the care of your patient.      Sincerely,    Juan José Spaulding MD

## 2018-06-18 NOTE — PROGRESS NOTES
REFERRING PROVIDER: Dr. De La Torre    CHIEF CONCERN: Right shoulder pain    HISTORY: (Patient seen and evaluated with his 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  The patient describes an injury in which he fell off a 6 foot ladder while at work on November 6, 2017.  He sustained a comminuted intra-articular distal radius fracture which was treated with open reduction internal fixation by Dr. Woodard.  During his recovery postoperatively he noted right shoulder pain which was initially masked by the symptoms are treatable to his wrist.  He recently underwent a independent medical evaluation which recommended further evaluation and treatment of his right shoulder.  He was then seen and evaluated by Dr. De La Torre who obtained an MRI and referred him to clinic today for further evaluation discussion of treatment options    In clinic today the patient notes predominantly right lateral shoulder pain which is exacerbated by lifting activities.  He has pain that wakes him at night and interferes with sleep.  He has additional difficulty with overhead activities but is back to work on light duty.    PAST MEDICAL HISTORY: (Reviewed with the patient and in the medical record)    None    No past medical history on file.    PAST SURGICAL HISTORY: (Reviewed with the patient and in the medical record)      Past Surgical History:   Procedure Laterality Date     OPEN REDUCTION INTERNAL FIXATION WRIST Right 11/14/2017    Procedure: OPEN REDUCTION INTERNAL FIXATION WRIST;  Open Reduction Internal Fixation of Right Distal Radius Fracture;  Surgeon: Carlitos Woodard MD;  Location:  OR       MEDICATIONS: (Reviewed with the patient and in the medical record)    No home medications      ALLERGIES: (Reviewed with the patient and in the medical record)    No Known Allergies    SOCIAL HISTORY: (Reviewed with the patient and in the medical record)  --Tobacco:  None  --EtOH: None  --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 Pertinent positives are Right shoulder and right wrist pain.     EXAM:     General: Pleasant, well-developed, male   Affect: Articulates and communicates with a normal and congruent affect  Pulm: Non-labored breathing at rest  Vascular: Hand warm and well-perfused  Eyes: Extra-ocular movements are intact   Lymphatics: No swelling or lymphedema.  Neuro: Sensation is grossly intact to light touch in the Ax/MCN/M/R/U nerve distributions.  Integumentary: Normal hair and sweat patterns without evidence of skin breakdown      Right upper extremity:    SANE score: 75 (compared to 100)    Skin is intact with no lacerations, abrasions, or previous surgical incsions.    Nontender to palpation over the AC joint, subacromial space, posterior glenohumeral joint, and scapular spine    Tender to palpation over the bicipital groove    Range of Motion:     Forward Flexion: Active forward flexion to 170  without passive improvement.  Limited by pain    Abduction: Abduction to 100  actively without passive improvement limited by pain    External Rotation: External rotation to 15  without passive improvement.  No external rotation lag.    Internal Rotation: Internal rotation to the thoracic spine with normal lift off    Pain with Speed and Yergason's sign which reproduces usual symptoms.    Positive [place]ye's for usual pain symptoms    4/5 strength with resisted  forward flexion in the scapular plane limited by pain    5/5 strength with resisted external rotation in adduction    Full cervical range of motion without radicular symptoms      IMAGING:    The patient's imaging from clinic today and relevant prior studies were reviewed by me in clinic.     Right shoulder radiographs obtained May 30, 2018 and right shoulder MRI obtained June 1, 2018 reviewed in clinic today and demonstrate no acute osseous abnormalities, subluxation or glenohumeral dislocation.  There is evidence of moderate AC joint arthrosis good there is suggestion of mild partial-thickness supraspinatus tendon tearing without full thickness component.  There is preservation of the glenohumeral cartilage surfaces.  There is no increased signal in the biceps tendon or subluxation out of the groove    ASSESSMENT:  1. Right partial-thickness supraspinatus tendon tearing  2. Right biceps tendinopathy  3. Right AC joint arthropathy, asymptomatic    PLAN:  We had an extensive discussion with the patient clinic today with regards to his right shoulder pain symptoms.  We reviewed his history, exam, and imaging findings which are suggestive of biceps tendinopathy as his primary pain generator.  We discussed his MRI which shows evidence of mild partial-thickness rotator cuff tearing.  We would advocate for a trial of physical therapy and an order was provided in clinic today.  We recommend he pursue a conference of nonoperative treatment program and should physical therapy fail the next step we would recommend would be a ultrasound-guided biceps tendon corticosteroid injection.  This could be followed with an additional course of physical therapy if he fails to have durable pain relief.  We anticipate that nonoperative treatment is likely to relieve his symptoms allow him to return to his activities at the level that he would like.  However if he has refractory symptoms after an extensive nonoperative treatment course  for up to one year he may return on an as-needed basis to discuss surgical treatment options.      Physical therapy order provided in clinic today    May follow-up with Dr. De La Torre for ultrasound-guided corticosteroid injection of the bicipital groove if he fails to have durable relief after 3-4 month trial of physical therapy    May follow-up on an as-needed basis if he has persistent or worsening pain symptoms after 6-12 months of a conference of nonoperative treatment program.    Lj López MD 06/18/18  Orthopaedic Surgery Resident, PGY-5  Pager: [phone]    Answers for HPI/ROS submitted by the patient on 6/18/2018   General Symptoms: No  Skin Symptoms: No  HENT Symptoms: No  EYE SYMPTOMS: No  HEART SYMPTOMS: No  LUNG SYMPTOMS: No  INTESTINAL SYMPTOMS: No  URINARY SYMPTOMS: No  REPRODUCTIVE SYMPTOMS: No  SKELETAL SYMPTOMS: No  BLOOD SYMPTOMS: No  NERVOUS SYSTEM SYMPTOMS: No  MENTAL HEALTH SYMPTOMS: No  I saw the patient with the resident.  I agree with the resident note and plan of care.      Juan José Spaulding MD

## 2018-06-20 ENCOUNTER — THERAPY VISIT (OUTPATIENT)
Dept: PHYSICAL THERAPY | Facility: CLINIC | Age: 53
End: 2018-06-20
Payer: OTHER MISCELLANEOUS

## 2018-06-20 DIAGNOSIS — M25.511 SHOULDER PAIN, RIGHT: Primary | ICD-10-CM

## 2018-06-20 PROCEDURE — 97161 PT EVAL LOW COMPLEX 20 MIN: CPT | Mod: GP | Performed by: PHYSICAL THERAPIST

## 2018-06-20 PROCEDURE — 97110 THERAPEUTIC EXERCISES: CPT | Mod: GP | Performed by: PHYSICAL THERAPIST

## 2018-06-20 NOTE — PROGRESS NOTES
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 Spaulding for treatment of biceps tendinitis with Precautions/Restrictions/MD instructions none    Therapist Assessment:   1.  Weakness of scapular stabilizers and external rotators  2.  TTP biceps tendon    Subjective:  Referring MD visit date: 6/18/18  DOI/onset: 11/7/18  Mechanism of injury: Patient fell off of a ladder at work suffering a FOOSH injury.  This resulted in a wrist fracture and subsequent ORIF.  Since then, he has had shoulder pain.    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  Flexion: Left:5/5    Pain: -    Right: 4+/5      Pain:  +    Abduction:  Left: 5/5   Pain:-    Right: 4+/5      Pain:+    Internal Rotation:  Left:5/5      Pain:-    Right: 5/5      Pain:-  External Rotation:   Left:5/5      Pain:-   Right:5/5      Pain:-              Special Tests:      Right  shoulder positive for the following special tests:Impingement  Right shoulder negative for the following special tests:Rotator cuff tear  Palpation:      Right shoulder tenderness present at: Biceps                                     General     ROS    Assessment/Plan:    Patient is a 52 year old male with right side shoulder complaints.    Patient has the following significant findings with corresponding treatment plan.                Diagnosis 1:  Biceps tendinopathy    Pain -  hot/cold therapy, manual therapy, splint/taping/bracing/orthotics, self management, education and home program  Decreased strength - therapeutic exercise and therapeutic activities  Decreased proprioception - neuro re-education and therapeutic activities  Impaired muscle performance - neuro re-education  Decreased function - therapeutic activities    Therapy Evaluation Codes:   1) History comprised of:   Personal factors that impact the plan of care:      Time since onset of symptoms.    Comorbidity factors that impact the plan of care are:      None.     Medications impacting care: None.  2) Examination of Body Systems comprised of:   Body structures and functions that impact the plan of care:      Shoulder.   Activity limitations that impact the plan of care are:      Lifting, Sleeping and reaching.  3) Clinical presentation characteristics are:   Stable/Uncomplicated.  4) Decision-Making    Low complexity using standardized patient assessment instrument and/or measureable assessment of functional outcome.  Cumulative Therapy Evaluation is: Low complexity.    Previous and current functional limitations:  (See Goal Flow Sheet for this information)    Short term and Long term goals: (See Goal Flow Sheet for this information)     Communication ability:  Patient appears to be able to clearly communicate and understand verbal and written communication and follow directions correctly.  Treatment Explanation - The following has been discussed  with the patient:   RX ordered/plan of care  Anticipated outcomes  Possible risks and side effects  This patient would benefit from PT intervention to resume normal activities.   Rehab potential is good.    Frequency:  2 X a month, once daily  Duration:  for 3 months  Discharge Plan:  Achieve all LTG.  Independent in home treatment program.  Reach maximal therapeutic benefit.    Please refer to the daily flowsheet for treatment today, total treatment time and time spent performing 1:1 timed codes.

## 2018-06-20 NOTE — MR AVS SNAPSHOT
After Visit Summary   6/20/2018    Justice Yee    MRN: [id number]           Patient Information     Date Of Birth          1965        Visit Information        Provider Department      6/20/2018 12:20 PM Bridget Almaguer PT [facility]        Today's Diagnoses     Shoulder pain, right    -  1       Follow-ups after your visit        Your next 10 appointments already scheduled     Jun 21, 2018  4:00 PM CDT   [facility] Hand with CARLIE Joy    [facility] ([facility])    [address] [place] [id number] [phone]            Jun 28, 2018  4:00 PM CDT   (Arrive by 3:45 PM)   RETURN HAND with Carlitos Woodard MD   [facility] ([facility])    [address] [place] [id number] [phone]            Jun 28, 2018  5:00 PM CDT   [facility] Hand with Delilah Rahman OT   [facility] ([facility])    [address] [place] [id number] [phone]            Jul 02, 2018 12:50 PM CDT   [facility] Extremity with Bridget Almaguer PT   [facility] ([facility])    [address] [place] [id number] [phone]            Jul 18, 2018  1:30 PM CDT   [facility] Extremity with Bridget Almaguer PT   [facility] ([facility])    [address] [place] [id number] [phone]              Who to contact     If you have questions or need follow up information about today's clinic visit or your schedule please contact [facility] directly at [phone].  Normal or non-critical lab and imaging results will be communicated to you by MyChart, letter or phone within 4 business days after the clinic has received the results. If you do not hear from us  within 7 days, please contact the clinic through [employer] or phone. If you have a critical or abnormal lab result, we will notify you by phone as soon as possible.  Submit refill requests through [employer] or call your pharmacy and they will forward the refill request to us. Please allow 3 business days for your refill to be completed.          Additional Information About Your Visit        Care EveryWhere ID     This is your Care EveryWhere ID. This could be used by other organizations to access your [place] medical records  [id number]         Blood Pressure from Last 3 Encounters:   11/14/17 128/83   11/13/17 156/85    Weight from Last 3 Encounters:   06/18/18 102.1 kg (225 lb)   11/14/17 96.2 kg (212 lb)   11/13/17 96.2 kg (212 lb)              We Performed the Following     HC PT EVAL, LOW COMPLEXITY     [facility] INITIAL EVAL REPORT     THERAPEUTIC EXERCISES        Primary Care Provider Fax #    Physician No Ref-Primary [phone]       No address on file        Equal Access to Services     DANIEL HANSEN : Hadii orlando perezo Sorubén, waaxda luqadaha, qaybta kaalmada ademasteryachelly, sandra waters . So [facility] [phone].    ATENCIÓN: Si habla español, tiene a otero disposición servicios gratuitos de asistencia lingüística. Llame al [phone].    We comply with applicable federal civil rights laws and [place] laws. We do not discriminate on the basis of race, color, national origin, age, disability, sex, sexual orientation, or gender identity.            Thank you!     Thank you for choosing [facility]  for your care. Our goal is always to provide you with excellent care. Hearing back from our patients is one way we can continue to improve our services. Please take a few minutes to complete the written survey that you may receive in the mail after your visit with us. Thank you!             Your Updated Medication List - Protect others around you: Learn how to safely use,  store and throw away your medicines at www.disposemymeds.org.          This list is accurate as of 6/20/18 12:56 PM.  Always use your most recent med list.                   Brand Name Dispense Instructions for use Diagnosis    TYLENOL PO      Take 320 mg by mouth

## 2018-06-28 ENCOUNTER — THERAPY VISIT (OUTPATIENT)
Dept: OCCUPATIONAL THERAPY | Facility: CLINIC | Age: 53
End: 2018-06-28
Payer: OTHER MISCELLANEOUS

## 2018-06-28 ENCOUNTER — OFFICE VISIT (OUTPATIENT)
Dept: ORTHOPEDICS | Facility: CLINIC | Age: 53
End: 2018-06-28
Payer: OTHER MISCELLANEOUS

## 2018-06-28 DIAGNOSIS — M65.4 RADIAL STYLOID TENOSYNOVITIS: Primary | ICD-10-CM

## 2018-06-28 DIAGNOSIS — M25.531 RIGHT WRIST PAIN: Primary | ICD-10-CM

## 2018-06-28 DIAGNOSIS — M25.531 RIGHT WRIST PAIN: ICD-10-CM

## 2018-06-28 DIAGNOSIS — M25.641 STIFFNESS OF FINGER JOINT, RIGHT: ICD-10-CM

## 2018-06-28 DIAGNOSIS — M65.4 RADIAL STYLOID TENOSYNOVITIS: ICD-10-CM

## 2018-06-28 DIAGNOSIS — M25.631 WRIST STIFFNESS, RIGHT: ICD-10-CM

## 2018-06-28 PROCEDURE — 97530 THERAPEUTIC ACTIVITIES: CPT | Mod: GO | Performed by: OCCUPATIONAL THERAPIST

## 2018-06-28 NOTE — LETTER
6/28/2018       RE: Justice Yee  [address]  [facility] [address]     Dear Colleague,    Thank you for referring your patient, Justice Yee, to the [facility]. Please see a copy of my visit note below.    Patient returns for a follow-up visit after open reduction internal fixation of right distal radius fracture.    INTERVAL HISTORY: Patient reports exacerbation of his right wrist radial sided pain that had previously improved with steroid injection. This pain prevents him from lifting a gallon of milk and also with turning door knobs. Also has ulnar sided wrist pain as well, but this is not as painful as the radial sided pain. These symptoms are partially resolved with a wrist splint that stabilizes the proximal row.  Patient has seen orthopedic surgery for right shoulder partial thickness rotator cuff tearing and has been recommended physical therapy for now.    PHYSICAL EXAMINATION:  General: In no acute distress.  On exam of the right wrist, there is minimal swelling today. There is tenderness on palpation of the radial side of the wrist and Finkelstein maneuver is positive with pain. There is tenderness but less so on the ulnar side of the wrist. No subluxation of the ECU with attempted wrist extension both in supination and pronation. DRUJ is stable on piano key maneuver. Peguero test is positive with pain but without a clunk. AROM of wrist is within normal limits. He is able to make a composite fist and extend all digits.  strength 60. Sensation to light touch intact in all three nerve distributions and also along proximal palm, though patient reports it is different than other parts of hand.    IMAGING: Previous XR of right wrist on 5/10/2018 does not show SL gap.    ASSESSMENT: 7.5 months status post open reduction internal fixation of work related right distal radius intra-articular fracture. Continues to have radial  sided wrist pain that is preventing him from lifting objects greater than 10 lbs.    PLAN: Concern is for re exacerbation of de Quervains and ECU tendinitis. Will obtain US of both radial and ulnar side of wrist to eval for subsheaths and any imaging evidence to suggest active inflammation. In addition, after discussion with hand therapist, there is concern for underlying ligamentous injury within the wrist that might be contributing to re exacerbation of de Quervain's and ECU tendinitis. With wrist splint that stabilizes the proximal row, patient's symptoms improve partially and he is able to bear significantly more weight through the wrist. Despite normal SL gap on imaging, I'd like to obtain MRI of the wrist to eval for any ligamentous injuries that may have been missed. All activity restrictions are discontinued at this time. I will see patient back once imaging performed. He is to wear a wrist splint as needed.    Total time spent with patient was 15 min of which greater than 50% was in counseling.    Again, thank you for allowing me to participate in the care of your patient.      Sincerely,    Carlitos Woodard MD

## 2018-06-28 NOTE — LETTER
6/28/2018      RE: Justice Yee  [address]  [facility] [address]       Patient returns for a follow-up visit after open reduction internal fixation of right distal radius fracture.    INTERVAL HISTORY: Patient reports exacerbation of his right wrist radial sided pain that had previously improved with steroid injection. This pain prevents him from lifting a gallon of milk and also with turning door knobs. Also has ulnar sided wrist pain as well, but this is not as painful as the radial sided pain. These symptoms are partially resolved with a wrist splint that stabilizes the proximal row.  Patient has seen orthopedic surgery for right shoulder partial thickness rotator cuff tearing and has been recommended physical therapy for now.    PHYSICAL EXAMINATION:  General: In no acute distress.  On exam of the right wrist, there is minimal swelling today. There is tenderness on palpation of the radial side of the wrist and Finkelstein maneuver is positive with pain. There is tenderness but less so on the ulnar side of the wrist. No subluxation of the ECU with attempted wrist extension both in supination and pronation. DRUJ is stable on piano key maneuver. Peguero test is positive with pain but without a clunk. AROM of wrist is within normal limits. He is able to make a composite fist and extend all digits.  strength 60. Sensation to light touch intact in all three nerve distributions and also along proximal palm, though patient reports it is different than other parts of hand.    IMAGING: Previous XR of right wrist on 5/10/2018 does not show SL gap.    ASSESSMENT: 7.5 months status post open reduction internal fixation of work related right distal radius intra-articular fracture. Continues to have radial sided wrist pain that is preventing him from lifting objects greater than 10 lbs.    PLAN: Concern is for re exacerbation of de Quervains and ECU tendinitis. Will obtain US of both radial and ulnar side of  wrist to eval for subsheaths and any imaging evidence to suggest active inflammation. In addition, after discussion with hand therapist, there is concern for underlying ligamentous injury within the wrist that might be contributing to re exacerbation of de Quervain's and ECU tendinitis. With wrist splint that stabilizes the proximal row, patient's symptoms improve partially and he is able to bear significantly more weight through the wrist. Despite normal SL gap on imaging, I'd like to obtain MRI of the wrist to eval for any ligamentous injuries that may have been missed. All activity restrictions are discontinued at this time. I will see patient back once imaging performed. He is to wear a wrist splint as needed.    Total time spent with patient was 15 min of which greater than 50% was in counseling.    Carlitos Woodard MD

## 2018-06-28 NOTE — MR AVS SNAPSHOT
After Visit Summary   6/28/2018    Justice Yee    MRN: [id number]           Patient Information     Date Of Birth          1965        Visit Information        Provider Department      6/28/2018 5:00 PM Delilah Rahman OT [facility]        Today's Diagnoses     Right wrist pain    -  1    Radial styloid tenosynovitis        Wrist stiffness, right        Stiffness of finger joint, right           Follow-ups after your visit        Your next 10 appointments already scheduled     Jul 02, 2018 12:50 PM CDT   [facility] Extremity with Bridget Almaguer PT   [facility] ([facility])    [address] [place] [address] 5th Floor  [facility] [address]   [phone]            Jul 06, 2018  3:00 PM CDT   [facility] Hand with Delilah Rahman OT   [facility] ([facility])    [address] [place] [address]  4th Floor  [facility] [address]   [phone]            Jul 07, 2018  7:45 AM CDT   MR WRIST RIGHT W/O CONTRAST with [id number]   [facility] MRI ([facility])    [address] [place] [address]  1st Floor  [facility] [address]   [phone]           Take your medicines as usual, unless your doctor tells you not to. Bring a list of your current medicines to your exam (including vitamins, minerals and over-the-counter drugs). Also bring the results of similar scans you may have had.  Please remove any body piercings and hair extensions before you arrive.  Follow your doctor s orders. If you do not, we may have to postpone your exam.  You may or may not receive IV contrast for this exam pending the discretion of the Radiologist.  You do not need to do anything special to prepare.  The MRI machine uses a strong magnet. Please wear clothes without metal (snaps, zippers). A sweatsuit works well, or we may give you a hospital gown.   **IMPORTANT** THE INSTRUCTIONS BELOW ARE ONLY FOR THOSE PATIENTS WHO  HAVE BEEN PRESCRIBED SEDATION OR GENERAL ANESTHESIA DURING THEIR MRI PROCEDURE:  IF YOUR DOCTOR PRESCRIBED ORAL SEDATION (take medicine to help you relax during your exam):   You must get the medicine from your doctor (oral medication) before you arrive. Bring the medicine to the exam. Do not take it at home. You ll be told when to take it upon arriving for your exam.   Arrive one hour early. Bring someone who can take you home after the test. Your medicine will make you sleepy. After the exam, you may not drive, take a bus or take a taxi by yourself.  IF YOUR DOCTOR PRESCRIBED IV SEDATION:   Arrive one hour early. Bring someone who can take you home after the test. Your medicine will make you sleepy. After the exam, you may not drive, take a bus or take a taxi by yourself.   No eating 6 hours before your exam. You may have clear liquids up until 4 hours before your exam. (Clear liquids include water, clear tea, black coffee and fruit juice without pulp.)  IF YOUR DOCTOR PRESCRIBED ANESTHESIA (be asleep for your exam):   Arrive 1 1/2 hours early. Bring someone who can take you home after the test. You may not drive, take a bus or take a taxi by yourself.   No eating 8 hours before your exam. You may have clear liquids up until 4 hours before your exam. (Clear liquids include water, clear tea, black coffee and fruit juice without pulp.)   You will spend four to five hours in the recovery room.  Please call the Imaging Department at your exam site with any questions.            Jul 12, 2018  3:00 PM CDT   [facility] Hand with Delilah Rahman OT   [facility] ([facility])    [address] [place] [address]  4th Floor  [facility] [address] [phone]            Jul 17, 2018  9:00 AM CDT   US EXTREMITY NON VASCULAR RIGHT with [id number]   [facility] US ([facility])    [address] [place] [address]  1st Floor  [facility] [id number] [phone]           Please  bring a list of your medicines (including vitamins, minerals and over-the-counter drugs). Also, tell your doctor about any allergies you may have. Wear comfortable clothes and leave your valuables at home.  You do not need to do anything special to prepare for your exam.  Please call the Imaging Department at your exam site with any questions.            Jul 18, 2018  1:30 PM CDT   [facility] Extremity with Bridget Almaguer PT   [facility] ([facility])    [address] [place] [address] 5th Floor  [facility] [address] [phone]            Jul 19, 2018  4:15 PM CDT   (Arrive by 4:00 PM)   RETURN HAND with Carlitos Woodard MD   [facility] ([facility])    [address] [place] [address]  4th [facility] [address] [phone]              Future tests that were ordered for you today     Open Future Orders        Priority Expected Expires Ordered    MR Wrist Right w/o Contrast Routine  6/28/2019 6/28/2018    US Extremity non-vascular RT Routine  6/28/2019 6/28/2018            Who to contact     If you have questions or need follow up information about today's clinic visit or your schedule please contact [facility] directly at [phone].  Normal or non-critical lab and imaging results will be communicated to you by MyChart, letter or phone within 4 business days after the clinic has received the results. If you do not hear from us within 7 days, please contact the clinic through MyChart or phone. If you have a critical or abnormal lab result, we will notify you by phone as soon as possible.  Submit refill requests through [employer] or call your pharmacy and they will forward the refill request to us. Please allow 3 business days for your refill to be completed.          Additional Information About Your Visit        Care EveryWhere ID     This is your Care EveryWhere ID. This could be used by other organizations to access your  [place] medical records  [id number]         Blood Pressure from Last 3 Encounters:   11/14/17 128/83   11/13/17 156/85    Weight from Last 3 Encounters:   06/18/18 102.1 kg (225 lb)   11/14/17 96.2 kg (212 lb)   11/13/17 96.2 kg (212 lb)              We Performed the Following     THERAPEUTIC ACTIVITIES        Primary Care Provider Fax #    Physician No Ref-Primary [phone]       No address on file        Equal Access to Services     DANIEL HANSEN : Hadii aad ku hadasho Soomaali, waaxda luqadaha, qaybta kaalmada adeegyada, waxay idiin hayaan ademaster waters . So [facility] [phone].    ATENCIÓN: Si habla español, tiene a otero disposición servicios gratuitos de asistencia lingüística. Ll[facility] [phone].    We comply with applicable federal civil rights laws and [place] laws. We do not discriminate on the basis of race, color, national origin, age, disability, sex, sexual orientation, or gender identity.            Thank you!     Thank you for choosing [facility]  for your care. Our goal is always to provide you with excellent care. Hearing back from our patients is one way we can continue to improve our services. Please take a few minutes to complete the written survey that you may receive in the mail after your visit with us. Thank you!             Your Updated Medication List - Protect others around you: Learn how to safely use, store and throw away your medicines at www.disposemymeds.org.          This list is accurate as of 6/28/18  5:56 PM.  Always use your most recent med list.                   Brand Name Dispense Instructions for use Diagnosis    TYLENOL PO      Take 320 mg by mouth

## 2018-06-28 NOTE — MR AVS SNAPSHOT
After Visit Summary   6/28/2018    Justice Yee    MRN: [id number]           Patient Information     Date Of Birth          1965        Visit Information        Provider Department      6/28/2018 4:00 PM Carlitos Woodard MD [facility]        Today's Diagnoses     Radial styloid tenosynovitis    -  1    Right wrist pain           Follow-ups after your visit        Your next 10 appointments already scheduled     Jul 02, 2018 12:50 PM CDT   SHALOM Extremity with Bridget Almaguer PT   [facility] SHALOM ([facility])    [address] [place] [address] 5th Floor  [facility] [address]   [phone]            Jul 06, 2018  3:00 PM CDT   SHALOM Hand with Delilah Rahman OT   [facility] ([facility])    [address] [place] [address]  [address] [facility] [address]   [phone]            Jul 07, 2018  7:45 AM CDT   MR WRIST RIGHT W/O CONTRAST with [id number]   [facility] MRI ([facility])    [address] [place] [address]  [address] [facility] [address]   [phone]           Take your medicines as usual, unless your doctor tells you not to. Bring a list of your current medicines to your exam (including vitamins, minerals and over-the-counter drugs). Also bring the results of similar scans you may have had.  Please remove any body piercings and hair extensions before you arrive.  Follow your doctor s orders. If you do not, we may have to postpone your exam.  You may or may not receive IV contrast for this exam pending the discretion of the Radiologist.  You do not need to do anything special to prepare.  The MRI machine uses a strong magnet. Please wear clothes without metal (snaps, zippers). A sweatsuit works well, or we may give you a hospital gown.   **IMPORTANT** THE INSTRUCTIONS BELOW ARE ONLY FOR THOSE PATIENTS WHO HAVE BEEN PRESCRIBED SEDATION OR GENERAL ANESTHESIA DURING THEIR MRI  PROCEDURE:  IF YOUR DOCTOR PRESCRIBED ORAL SEDATION (take medicine to help you relax during your exam):   You must get the medicine from your doctor (oral medication) before you arrive. Bring the medicine to the exam. Do not take it at home. You ll be told when to take it upon arriving for your exam.   Arrive one hour early. Bring someone who can take you home after the test. Your medicine will make you sleepy. After the exam, you may not drive, take a bus or take a taxi by yourself.  IF YOUR DOCTOR PRESCRIBED IV SEDATION:   Arrive one hour early. Bring someone who can take you home after the test. Your medicine will make you sleepy. After the exam, you may not drive, take a bus or take a taxi by yourself.   No eating 6 hours before your exam. You may have clear liquids up until 4 hours before your exam. (Clear liquids include water, clear tea, black coffee and fruit juice without pulp.)  IF YOUR DOCTOR PRESCRIBED ANESTHESIA (be asleep for your exam):   Arrive 1 1/2 hours early. Bring someone who can take you home after the test. You may not drive, take a bus or take a taxi by yourself.   No eating 8 hours before your exam. You may have clear liquids up until 4 hours before your exam. (Clear liquids include water, clear tea, black coffee and fruit juice without pulp.)   You will spend four to five hours in the recovery room.  Please call the Imaging Department at your exam site with any questions.            Jul 12, 2018  3:00 PM CDT   SHALOM Hand with Delilah Rahman OT   [facility] ([facility])    [address] [place] [address]  [address]th [facility] [address] [phone]            Jul 17, 2018  9:00 AM CDT   US EXTREMITY NON VASCULAR RIGHT with [id number]   [facility] US ([facility])    [address]  1st Floor  [facility] [address] [phone]           Please bring a list of your medicines (including vitamins, minerals and  over-the-counter drugs). Also, tell your doctor about any allergies you may have. Wear comfortable clothes and leave your valuables at home.  You do not need to do anything special to prepare for your exam.  Please call the Imaging Department at your exam site with any questions.            Jul 18, 2018  1:30 PM CDT   SHALOM Extremity with Bridget Almaguer PT   [facility] SHALOM ([facility])    [address] [place] [address] 5th Floor  [facility] [address] [phone]            Jul 19, 2018  4:15 PM CDT   (Arrive by 4:00 PM)   RETURN HAND with Carlitos Woodard MD   [facility] ([facility])    [address] [place] [address]  4th [facility] [address] [phone]              Future tests that were ordered for you today     Open Future Orders        Priority Expected Expires Ordered    MR Wrist Right w/o Contrast Routine  6/28/2019 6/28/2018    US Extremity non-vascular RT Routine  6/28/2019 6/28/2018            Who to contact     Please call your clinic at [phone] to:    Ask questions about your health    Make or cancel appointments    Discuss your medicines    Learn about your test results    Speak to your doctor            Additional Information About Your Visit        Care EveryWhere ID     This is your Care EveryWhere ID. This could be used by other organizations to access your [place] medical records  [id number]         Blood Pressure from Last 3 Encounters:   11/14/17 128/83   11/13/17 156/85    Weight from Last 3 Encounters:   06/18/18 102.1 kg (225 lb)   11/14/17 96.2 kg (212 lb)   11/13/17 96.2 kg (212 lb)              We Performed the Following     [facility] WORKMAN'S COMP VISIT        Primary Care Provider Fax #    Physician No Ref-Primary [phone]       No address on file        Equal Access to Services     DANIEL HANSEN : jerman Lynn, sandra valenzuela  nereida zelayanegrakip garzonPatyaamaia ah. So [facility] [phone].    ATENCIÓN: Si habla ellen, tiene a otero disposición servicios gratuitos de asistencia lingüística. Siobhan al [phone].    We comply with applicable federal civil rights laws and [place] laws. We do not discriminate on the basis of race, color, national origin, age, disability, sex, sexual orientation, or gender identity.            Thank you!     Thank you for choosing [facility]  for your care. Our goal is always to provide you with excellent care. Hearing back from our patients is one way we can continue to improve our services. Please take a few minutes to complete the written survey that you may receive in the mail after your visit with us. Thank you!             Your Updated Medication List - Protect others around you: Learn how to safely use, store and throw away your medicines at www.disposemymeds.org.          This list is accurate as of 6/28/18  5:49 PM.  Always use your most recent med list.                   Brand Name Dispense Instructions for use Diagnosis    TYLENOL PO      Take 320 mg by mouth

## 2018-06-28 NOTE — LETTER
Date:July 3, 2018      Provider requested that no letter be sent. Do not send.       [facility] Health Information

## 2018-06-29 NOTE — PROGRESS NOTES
Patient returns for a follow-up visit after open reduction internal fixation of right distal radius fracture.    INTERVAL HISTORY: Patient reports exacerbation of his right wrist radial sided pain that had previously improved with steroid injection. This pain prevents him from lifting a gallon of milk and also with turning door knobs. Also has ulnar sided wrist pain as well, but this is not as painful as the radial sided pain. These symptoms are partially resolved with a wrist splint that stabilizes the proximal row.  Patient has seen orthopedic surgery for right shoulder partial thickness rotator cuff tearing and has been recommended physical therapy for now.    PHYSICAL EXAMINATION:  General: In no acute distress.  On exam of the right wrist, there is minimal swelling today. There is tenderness on palpation of the radial side of the wrist and Finkelstein maneuver is positive with pain. There is tenderness but less so on the ulnar side of the wrist. No subluxation of the ECU with attempted wrist extension both in supination and pronation. DRUJ is stable on piano key maneuver. Peguero test is positive with pain but without a clunk. AROM of wrist is within normal limits. He is able to make a composite fist and extend all digits.  strength 60. Sensation to light touch intact in all three nerve distributions and also along proximal palm, though patient reports it is different than other parts of hand.    IMAGING: Previous XR of right wrist on 5/10/2018 does not show SL gap.    ASSESSMENT: 7.5 months status post open reduction internal fixation of work related right distal radius intra-articular fracture. Continues to have radial sided wrist pain that is preventing him from lifting objects greater than 10 lbs.    PLAN: Concern is for re exacerbation of de Quervains and ECU tendinitis. Will obtain US of both radial and ulnar side of wrist to eval for subsheaths and any imaging evidence to suggest active inflammation.  In addition, after discussion with hand therapist, there is concern for underlying ligamentous injury within the wrist that might be contributing to re exacerbation of de Quervain's and ECU tendinitis. With wrist splint that stabilizes the proximal row, patient's symptoms improve partially and he is able to bear significantly more weight through the wrist. Despite normal SL gap on imaging, I'd like to obtain MRI of the wrist to eval for any ligamentous injuries that may have been missed. All activity restrictions are discontinued at this time. I will see patient back once imaging performed. He is to wear a wrist splint as needed.    Total time spent with patient was 15 min of which greater than 50% was in counseling.

## 2018-07-02 ENCOUNTER — THERAPY VISIT (OUTPATIENT)
Dept: PHYSICAL THERAPY | Facility: CLINIC | Age: 53
End: 2018-07-02
Attending: ORTHOPAEDIC SURGERY
Payer: OTHER MISCELLANEOUS

## 2018-07-02 DIAGNOSIS — M25.511 SHOULDER PAIN, RIGHT: ICD-10-CM

## 2018-07-02 PROCEDURE — 97110 THERAPEUTIC EXERCISES: CPT | Mod: GP | Performed by: PHYSICAL THERAPIST

## 2018-07-02 PROCEDURE — 97140 MANUAL THERAPY 1/> REGIONS: CPT | Mod: GP | Performed by: PHYSICAL THERAPIST

## 2018-07-07 ENCOUNTER — RADIANT APPOINTMENT (OUTPATIENT)
Dept: MRI IMAGING | Facility: CLINIC | Age: 53
End: 2018-07-07
Attending: PLASTIC SURGERY
Payer: OTHER MISCELLANEOUS

## 2018-07-07 DIAGNOSIS — M65.4 RADIAL STYLOID TENOSYNOVITIS: ICD-10-CM

## 2018-07-07 DIAGNOSIS — M25.531 RIGHT WRIST PAIN: ICD-10-CM

## 2018-07-10 ENCOUNTER — THERAPY VISIT (OUTPATIENT)
Dept: OCCUPATIONAL THERAPY | Facility: CLINIC | Age: 53
End: 2018-07-10
Payer: OTHER MISCELLANEOUS

## 2018-07-10 DIAGNOSIS — M25.531 RIGHT WRIST PAIN: Primary | ICD-10-CM

## 2018-07-10 DIAGNOSIS — M65.4 RADIAL STYLOID TENOSYNOVITIS: ICD-10-CM

## 2018-07-10 DIAGNOSIS — M25.641 STIFFNESS OF FINGER JOINT, RIGHT: ICD-10-CM

## 2018-07-10 DIAGNOSIS — M25.631 WRIST STIFFNESS, RIGHT: ICD-10-CM

## 2018-07-10 PROCEDURE — 97535 SELF CARE MNGMENT TRAINING: CPT | Mod: GO | Performed by: OCCUPATIONAL THERAPIST

## 2018-07-10 PROCEDURE — 97035 APP MDLTY 1+ULTRASOUND EA 15: CPT | Mod: GO | Performed by: OCCUPATIONAL THERAPIST

## 2018-07-10 PROCEDURE — 97530 THERAPEUTIC ACTIVITIES: CPT | Mod: GO | Performed by: OCCUPATIONAL THERAPIST

## 2018-07-16 ENCOUNTER — THERAPY VISIT (OUTPATIENT)
Dept: OCCUPATIONAL THERAPY | Facility: CLINIC | Age: 53
End: 2018-07-16
Payer: OTHER MISCELLANEOUS

## 2018-07-16 ENCOUNTER — RADIANT APPOINTMENT (OUTPATIENT)
Dept: ULTRASOUND IMAGING | Facility: CLINIC | Age: 53
End: 2018-07-16
Attending: PLASTIC SURGERY
Payer: OTHER MISCELLANEOUS

## 2018-07-16 DIAGNOSIS — M25.631 WRIST STIFFNESS, RIGHT: ICD-10-CM

## 2018-07-16 DIAGNOSIS — M65.4 RADIAL STYLOID TENOSYNOVITIS: ICD-10-CM

## 2018-07-16 DIAGNOSIS — M25.531 RIGHT WRIST PAIN: Primary | ICD-10-CM

## 2018-07-16 DIAGNOSIS — M25.641 STIFFNESS OF FINGER JOINT, RIGHT: ICD-10-CM

## 2018-07-16 PROCEDURE — 97110 THERAPEUTIC EXERCISES: CPT | Mod: GO | Performed by: OCCUPATIONAL THERAPIST

## 2018-07-16 NOTE — MR AVS SNAPSHOT
After Visit Summary   7/16/2018    Justice Yee    MRN: [id number]           Patient Information     Date Of Birth          1965        Visit Information        Provider Department      7/16/2018 5:30 PM Delilah Rahman OT [facility]        Today's Diagnoses     Right wrist pain    -  1    Radial styloid tenosynovitis        Wrist stiffness, right        Stiffness of finger joint, right           Follow-ups after your visit        Your next 10 appointments already scheduled     Jul 18, 2018  1:30 PM CDT   SHALOM Extremity with Bridget Almaguer PT   [facility] SH[facility] ([facility])    [address] [facility] 5th Floor  [facility] [address] [phone]            Jul 19, 2018  4:15 PM CDT   (Arrive by 4:00 PM)   RETURN HAND with Carlitos Woodard MD   [facility] ([facility])    [address] [facility]  4th Floor  [facility] [address] [phone]              Who to contact     If you have questions or need follow up information about today's clinic visit or your schedule please contact [facility] directly at [phone].  Normal or non-critical lab and imaging results will be communicated to you by MyChart, letter or phone within 4 business days after the clinic has received the results. If you do not hear from us within 7 days, please contact the clinic through MyChart or phone. If you have a critical or abnormal lab result, we will notify you by phone as soon as possible.  Submit refill requests through [employer] or call your pharmacy and they will forward the refill request to us. Please allow 3 business days for your refill to be completed.          Additional Information About Your Visit        Care EveryWhere ID     This is your Care EveryWhere ID. This could be used by other organizations to access your [place] medical records  [id number]         Blood Pressure from Last 3  Encounters:   11/14/17 128/83   11/13/17 156/85    Weight from Last 3 Encounters:   06/18/18 102.1 kg (225 lb)   11/14/17 96.2 kg (212 lb)   11/13/17 96.2 kg (212 lb)              We Performed the Following     SHALOM PROGRESS NOTES REPORT     THERAPEUTIC EXERCISES        Primary Care Provider Fax #    Physician No Ref-Primary [phone]       No address on file        Equal Access to Services     DANIEL HANSEN : Hadii orlando perezo Somyrtleali, waaxda luqadaha, qaybta kaalmada adeegyada, sandra barakat benimaia zelayanegrakip waters . So [facility] [phone].    ATENCIÓN: Si habla español, tiene a otero disposición servicios gratuitos de asistencia lingüística. Coriname al [phone].    We comply with applicable federal civil rights laws and [place] laws. We do not discriminate on the basis of race, color, national origin, age, disability, sex, sexual orientation, or gender identity.            Thank you!     Thank you for choosing [facility]ER[facility]  for your care. Our goal is always to provide you with excellent care. Hearing back from our patients is one way we can continue to improve our services. Please take a few minutes to complete the written survey that you may receive in the mail after your visit with us. Thank you!             Your Updated Medication List - Protect others around you: Learn how to safely use, store and throw away your medicines at www.disposemymeds.org.          This list is accurate as of 7/16/18  6:06 PM.  Always use your most recent med list.                   Brand Name Dispense Instructions for use Diagnosis    TYLENOL PO      Take 320 mg by mouth

## 2018-07-16 NOTE — PROGRESS NOTES
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  Patient reports symptoms of pain and stiffness/loss of motion of the right hand and wrist which occurred due to fall off ladder.  Occupational Profile Information:  Current occupation is    Currently not working due to present treatment problem  Job Tasks: operating a machine/assembly, prolonged standing, lifting/carrying, pushing/pulling, repetitive tasks      S:  Subjective changes as noted by patient: I feel some weaker. Got the tests completed. Really no change in the last few weeks. The Wrist Restore does provide support.   Functional changes noted by patient: Noting pain with turning the steering wheel. Still can't pour a gallon of milk/ It is difficult to open the mouthwash bottle, I could do this before, but not now. No Change to Work Tasks and Household Chores  Response to previous treatment:  fair  Patient has noted adverse reaction to:   None    Objective:  Pain Level Report  VAS(0-10) 12/8/2017 2/2/18 2/13/18 2/27/18 3/20/18 5/3/18 5/22/18 5/29/18 7/16/18   At Rest: 4/10 5/10 1-2/10 0/10 0/10 0/10 0/10  0/10   With Use: 5/10 8/10 2-4/10 6/10 pushing up from hands 5/10 doing things 5/10 with motion, mario alberto wrist deviation moves 5/10 with motion, more on the ulnar side, radial side with push and turn 5/10 with motion, can cause a quick pain, causes me to  jump 5/10     Report of Pain:  Location:  Wrist and shoulder  Pain Quality:sore and can be sharp, nagging, and shooting, is at the central wrist.   Frequency: with use and weight bearing  Pain is worst:  Pushing up and  twisting from the hand, still pain and prevents from using hand to push up  Exacerbated by:  Weight bearing, twisting, and sideways lifting  Relieved by:  Rest      Wrist JPS ROM / Value   (Joint Position Sense)  3/20/2018   3/20/2018   5/29/18    Affected wrist right Absolute value  Absolute Value   Trial 1 18 -2 20 19   Trial 2 10 -10 20 16   Mean / Average  -6  3         Radial Wrist: Provocative Tests  Radial Wrist Zone: Provocative Tests: 1/16/2018 2/13/18 2/27/18 3/20/18 5/3 5/29/18 7/16/18   Pain Report:  - none    + mild    ++ moderate    +++ severe  Right right right right right right right   Finkelstein's Test ++ + ++ + + + +   Dequervain's: APL test + + + + + + +   EPB test:  + + - + + + +   Intersection: APL/EPB & ECRB/L test - - -   - -   ECU stress test:  ++ + - - + + +   ECU: supination overpressure + + + + +  +   Ulnar wrist/TFCC     + for TFCC grind/appoximation  ++ +   Ulnar fovea area    Pain +  + +       ROM:  Pain Report:  - none    + mild    ++ moderate    +++ severe   Wrist  12/8/17 2/13/18 2/27/18 3/20/18 4/9/18 4/16 5/3 5/22 5/29/18 7/16/18   AROM (PROM) R Right  Right right    Right (AM measure) R    Extension 15 50 54 58 55++ end range 56 tight no pain  (60)   60 55 55 62   Flexion 20 45 50 52 50 50 (60) 55 50 50 47   RD 0 10 20 + at Radial wrist 17+ at radial wrist   20+ radial wrist   15++   UD 0 35 30+ at Ulnar wrist 32+ at ulnar wrist   30+   27+   Supination 45 70        78   Pronation 90 90        90     ROM:  Thumb 2/13/2018 2/13/2018 2/27/18 3/20/18 5/29   AROM(PROM) Right Left right right right   MP /60 /70 x/60 /62    IP /45 /70 /52 /52    RAbd 54+ 60 58 58 58   PAbd 54+ 55 55+ 55+ 55   Retropulsion        Kapandji Opposition Scale (0-10/10)   9/10 9.5 9.75        Fingers  Extension/Flexion, AROM(PROM)  Date:   12/13 1/2/18 1/8 12/27/18 3/20/18 5/29 7/16   Side:  Right         AROM(PROM)            Index:  MP               PIP               DIP  FERRER:  75  95  50  -14/  0/84  0/35  177 0/82  0/92  0/40 0/82  0/105  0/45  232   88  105  45    Long:   MP               PIP               DIP  FERRER:  71  97  61 0/80  0/95  0/50  225  0/85  0/110  0/65  260 /87  /107  /68  262 /85  /115  /70 90  105  56    Ring:    MP                PIP                DIP  FERRER:  70  97  55 0/77  0/97  0/55  229  0/82  0/105  0/65  212       Small:   MP                PIP                DIP  FERRER:  75  95  58 -7/80  -4/94  0/58  221  0/85  0/100  0/70  255      Thumb:  MP                IP                RABD                PABD  25/61  +/30  45  55 -19/55  +/40  51  53            STRENGTH:   (Measured in pounds)     2018  3/20/18  5/3  5/22 2018    7/16/18    Trials Right Left Right Left right left right right left right left   1  2  3  Av 81 47  50  56  52 85 53  58  50  54 88 60 61  With Wrist Restore: 65 88 52 72       3 pt Pinch 2018  3/20/18 5/3 7/16    Trials Right Left Right right right left   1  2  3  Avg: 10+ at radial wrist 18 14 16 13 19     Lateral Pinch 2018  3/20/18 5/3 7/16    Trials Right Left Right right right left   1  2  3  Av 19 17 16 12++ 20     Assessment:  Initially : Response to therapy has been improvement to:  ROM of Wrist:  All Planes  Thumb:  All Planes  Fingers: All Planes  Strength:   and pinch  Edema:  Reduced   Pain:  frequency is less, intensity of pain is decreased and duration of pain is decreased  However, in the most recent months, Pt's Response to therapy has been lack of progress in:  ROM of Wrist:  Ext , Flex, Radial Deviation and Ulnar Deviation  Strength:   and pinch  Pain:  intensity of pain has increased, duration of pain is increased and more tender over affected area  Work Performance:  Unable to return to full duty  Self Care Skills:  Able to be independent in cares, however notes that I-ADLs are difficult, such as opening jars, bottles, lifting, carrying and pouring water or milk or    Overall Assessment:   Patient is not progressing as expected.   Patient has experienced an exacerbation of symptoms.  STG/LTG:  STGoals have been reviewed and no progress has been made;  see goal sheet for details and changes.  I have re-evaluated this patient and find that the nature, scope, duration and intensity of the therapy is appropriate for the medical condition of the patient.          P:  Discharge to home program.  Pt will follow up with hand surgeon for next step in his medical care.    Recommendations for Continued Therapy  Treatment Plan:    Additions to Treatment Plan -  Modalities:  US  Therapeutic Exercise:  Tendon Gliding, Isotonics, Isometrics and Stabilization  Neuromuscular re-education:  Stabilization  Self Care:  Ergonomic Considerations  Continue thumb spica orthosis for support as needed        Home Program:   Pen roll  Icing to ECU and avoid painful motions.   Continue wearing thumb spica for Dequervains as needed, and ECU tendonitis  Continue thumb and Wrist ROM exercises  DTM AROM and wall ball wt bearing, on fist/no pain provocation  DTM isometrics no pain, keep wrist is neutral position  5/3/2018  Counter  with dowel to fire FCR  5/22/2018  Marbles in wiffle ball, with RUE  Pt will have wife do deep 1st DC massage  5/29/2018  Fit with Wrist Restore, pt noted immediate pain reduction with wt bearing, will wear at work and day at home. And report on how this works to reduce pain and increase function

## 2018-07-19 ENCOUNTER — THERAPY VISIT (OUTPATIENT)
Dept: PHYSICAL THERAPY | Facility: CLINIC | Age: 53
End: 2018-07-19
Payer: OTHER MISCELLANEOUS

## 2018-07-19 ENCOUNTER — OFFICE VISIT (OUTPATIENT)
Dept: ORTHOPEDICS | Facility: CLINIC | Age: 53
End: 2018-07-19
Payer: OTHER MISCELLANEOUS

## 2018-07-19 DIAGNOSIS — M25.511 SHOULDER PAIN, RIGHT: ICD-10-CM

## 2018-07-19 DIAGNOSIS — M65.4 RADIAL STYLOID TENOSYNOVITIS: Primary | ICD-10-CM

## 2018-07-19 PROCEDURE — 97110 THERAPEUTIC EXERCISES: CPT | Mod: GP | Performed by: PHYSICAL THERAPY ASSISTANT

## 2018-07-19 PROCEDURE — 97140 MANUAL THERAPY 1/> REGIONS: CPT | Mod: GP | Performed by: PHYSICAL THERAPY ASSISTANT

## 2018-07-19 NOTE — MR AVS SNAPSHOT
After Visit Summary   7/19/2018    Justice Yee    MRN: [id number]           Patient Information     Date Of Birth          1965        Visit Information        Provider Department      7/19/2018 4:15 PM Carlitos Woodard MD [facility]        Today's Diagnoses     Radial styloid tenosynovitis    -  1       Follow-ups after your visit        Follow-up notes from your care team     Return if symptoms worsen or fail to improve.      Who to contact     Please call your clinic at [phone] to:    Ask questions about your health    Make or cancel appointments    Discuss your medicines    Learn about your test results    Speak to your doctor            Additional Information About Your Visit        Care EveryWhere ID     This is your Care EveryWhere ID. This could be used by other organizations to access your [place] medical records  [id number]         Blood Pressure from Last 3 Encounters:   11/14/17 128/83   11/13/17 156/85    Weight from Last 3 Encounters:   06/18/18 225 lb   11/14/17 212 lb   11/13/17 212 lb              Today, you had the following     No orders found for display       Primary Care Provider Fax #    Physician No Ref-Primary [phone]       No address on file        Equal Access to Services     [facility]: Hadii orlando Malloy, wasnowda lupaulino, qaybta kaalsujatha poole, sandra waters . So [facility] [phone].    ATENCIÓN: Si habla español, tiene a otero disposición servicios gratuitos de asistencia lingüística. Coriname al [phone].    We comply with applicable federal civil rights laws and [place] laws. We do not discriminate on the basis of race, color, national origin, age, disability, sex, sexual orientation, or gender identity.            Thank you!     Thank you for choosing [facility]  for your care. Our goal is always to provide you with excellent care. Hearing back from our patients is one way we  can continue to improve our services. Please take a few minutes to complete the written survey that you may receive in the mail after your visit with us. Thank you!             Your Updated Medication List - Protect others around you: Learn how to safely use, store and throw away your medicines at www.disposemymeds.org.          This list is accurate as of 7/19/18 11:59 PM.  Always use your most recent med list.                   Brand Name Dispense Instructions for use Diagnosis    TYLENOL PO      Take 320 mg by mouth

## 2018-07-19 NOTE — LETTER
7/19/2018      RE: Justice Yee  [address]  [facility] [address]       Patient returns for a follow up visit.    INTERVAL HISTORY: There has been no improvement in the radial and ulnar sided wrist pain since our last visit. Patient continues to experience sharp debilitating pain on the radial side of the wrist when turning door knobs. He feels that the Wrist Restore brace does help with wrist activities.    PHYSICAL EXAMINATION:  General: In no acute distress.  On exam of the right wrist, there is minimal swelling. Volar wrist incisions have all healed. There is pain on palpation of the radial wrist and pain with Finkelstein maneuver. Thumb grind test is nontender. DRUJ is stable. Peguero maneuver is tender, but there is no clunk. There is tenderness on palpation of the ECU over the ulnar head. ECU does not snap or sublux on wrist extension, flexion, pronation, and supination.    IMAGING:   MRI:  IMPRESSION:  1. Postsurgical changes of volar plate and screw fixation for a known  right distal radial fracture, which appears healed. Artifact from the  hardware markedly limits evaluation of the surrounding bone and soft  tissues of the right wrist. There is marked incongruity along the  distal radial articular surface, as described above.  2. Poor visualization of the triangular fibrocartilage complex,  scapholunate, and lunotriquetral ligaments, however the dorsal  component of the scapholunate ligament appears intact.  3. Mild tendinosis of the right breast extensor carpi ulnaris tendon.  4. Attenuation of the right wrist extensor pollicis longus tendon,  which otherwise appears intact.  5. Subcutaneous soft tissue edema along the volar and ulnar aspect of  the right wrist.    US:  IMPRESSION: .  1. Thickening of the tendon sheath over extensor compartment 1 with a  low-grade partial tearing of the extensor pollicis brevis tendon at  the level of the distal radius and radiocarpal joint.  2. Extensor  carpi ulnaris tendinopathy with low to moderate grade  tearing of the extensor carpi ulnaris tendon at the level of the ulnar  styloid.    ASSESSMENT: DeQuervain's tenosynovitis and ECU tendonitis. Healed distal radius fracture.    PLAN: Given that the patient is still symptomatic with DeQuervain's tenosynovitis despite splinting, steroid injection, and antiinflammatory medication, he is a candidate for release of right wrist first dorsal compartment as an outpatient at the [facility]. I explained the procedure in detail. I explained the risks to include nerve injury, tendon subluxation, recurrence, incomplete release, bleeding, infection, injury to surrounding structures, chronic stiffness and pain, and need for revision surgery. In addition, I explained to the patient that he may continue to have some persistent pain in the radial wrist despite release due to the partial tearing of EPB. Patient would like to consider this and get back to me. On a separate note, I am concerned that the MRI study was incomplete, though it did visualize an intact dorsal SL ligament. Given that the patient continues to note benefit from wearing a Wrist Restore brace, I hesitate to rule out ligamentous injuries in the deep wrist. If patient is unable to wean from the brace, he may be a candidate for diagnostic arthroscopy. At any rate, I will wait for the patient's decision on releasing the first dorsal compartment.    Total time spent with patient was 15 min of which greater than 50% was in counseling.    Carlitos Woodard MD

## 2018-07-19 NOTE — PROGRESS NOTES
Patient returns for a follow up visit.    INTERVAL HISTORY: There has been no improvement in the radial and ulnar sided wrist pain since our last visit. Patient continues to experience sharp debilitating pain on the radial side of the wrist when turning door knobs. He feels that the Wrist Restore brace does help with wrist activities.    PHYSICAL EXAMINATION:  General: In no acute distress.  On exam of the right wrist, there is minimal swelling. Volar wrist incisions have all healed. There is pain on palpation of the radial wrist and pain with Finkelstein maneuver. Thumb grind test is nontender. DRUJ is stable. Peguero maneuver is tender, but there is no clunk. There is tenderness on palpation of the ECU over the ulnar head. ECU does not snap or sublux on wrist extension, flexion, pronation, and supination.    IMAGING:   MRI:  IMPRESSION:  1. Postsurgical changes of volar plate and screw fixation for a known  right distal radial fracture, which appears healed. Artifact from the  hardware markedly limits evaluation of the surrounding bone and soft  tissues of the right wrist. There is marked incongruity along the  distal radial articular surface, as described above.  2. Poor visualization of the triangular fibrocartilage complex,  scapholunate, and lunotriquetral ligaments, however the dorsal  component of the scapholunate ligament appears intact.  3. Mild tendinosis of the right breast extensor carpi ulnaris tendon.  4. Attenuation of the right wrist extensor pollicis longus tendon,  which otherwise appears intact.  5. Subcutaneous soft tissue edema along the volar and ulnar aspect of  the right wrist.    US:  IMPRESSION: .  1. Thickening of the tendon sheath over extensor compartment 1 with a  low-grade partial tearing of the extensor pollicis brevis tendon at  the level of the distal radius and radiocarpal joint.  2. Extensor carpi ulnaris tendinopathy with low to moderate grade  tearing of the extensor carpi  ulnaris tendon at the level of the ulnar  styloid.    ASSESSMENT: DeQuervain's tenosynovitis and ECU tendonitis. Healed distal radius fracture.    PLAN: Given that the patient is still symptomatic with DeQuervain's tenosynovitis despite splinting, steroid injection, and antiinflammatory medication, he is a candidate for release of right wrist first dorsal compartment as an outpatient at the [facility]. I explained the procedure in detail. I explained the risks to include nerve injury, tendon subluxation, recurrence, incomplete release, bleeding, infection, injury to surrounding structures, chronic stiffness and pain, and need for revision surgery. In addition, I explained to the patient that he may continue to have some persistent pain in the radial wrist despite release due to the partial tearing of EPB. Patient would like to consider this and get back to me. On a separate note, I am concerned that the MRI study was incomplete, though it did visualize an intact dorsal SL ligament. Given that the patient continues to note benefit from wearing a Wrist Restore brace, I hesitate to rule out ligamentous injuries in the deep wrist. If patient is unable to wean from the brace, he may be a candidate for diagnostic arthroscopy. At any rate, I will wait for the patient's decision on releasing the first dorsal compartment.    Total time spent with patient was 15 min of which greater than 50% was in counseling.

## 2018-07-19 NOTE — NURSING NOTE
Reason For Visit:   Chief Complaint   Patient presents with     Surgical Followup     8 month pop ORIF right distal radius fracture.  Review U/S       Primary MD: No Ref-Primary, Physician  Ref. MD: Self    Age: 52 year old    ?  No      There were no vitals taken for this visit.      Pain Assessment  Patient Currently in Pain: Yes  0-10 Pain Scale: 5  Primary Pain Location: Wrist  Pain Orientation: Right  Pain Descriptors: Other (comment), Sharp (nagging)  Alleviating Factors: Rest  Aggravating Factors: Movement               QuickDASH Assessment  No flowsheet data found.       Current Outpatient Prescriptions   Medication Sig Dispense Refill     Acetaminophen (TYLENOL PO) Take 320 mg by mouth         No Known Allergies    Paola Raman, ATC

## 2018-07-20 ENCOUNTER — TELEPHONE (OUTPATIENT)
Dept: PLASTIC SURGERY | Facility: CLINIC | Age: 53
End: 2018-07-20

## 2018-07-20 ENCOUNTER — TELEPHONE (OUTPATIENT)
Dept: SURGERY | Facility: CLINIC | Age: 53
End: 2018-07-20

## 2018-07-20 NOTE — TELEPHONE ENCOUNTER
Received staff message to look into workman's comp, to see if a PA is needed.  Left voice message for -Angela to call me back, she is out of office until 7/24/18

## 2018-07-20 NOTE — TELEPHONE ENCOUNTER
Received voicemail message from patient requesting a call back to schedule surgery with Dr. Woodard.  Message forwarded to  for Dr. Woodard.

## 2018-07-22 DIAGNOSIS — M65.4 RADIAL STYLOID TENOSYNOVITIS: Primary | ICD-10-CM

## 2018-07-22 RX ORDER — CEFAZOLIN SODIUM 1 G/50ML
1 INJECTION, SOLUTION INTRAVENOUS SEE ADMIN INSTRUCTIONS
Status: CANCELLED | OUTPATIENT
Start: 2018-07-22 | End: 2019-07-22

## 2018-07-23 ENCOUNTER — TELEPHONE (OUTPATIENT)
Dept: ORTHOPEDICS | Facility: CLINIC | Age: 53
End: 2018-07-23

## 2018-07-23 ENCOUNTER — HOSPITAL ENCOUNTER (OUTPATIENT)
Facility: AMBULATORY SURGERY CENTER | Age: 53
End: 2018-07-23
Attending: PLASTIC SURGERY
Payer: OTHER MISCELLANEOUS

## 2018-07-25 ENCOUNTER — ANESTHESIA EVENT (OUTPATIENT)
Dept: SURGERY | Facility: CLINIC | Age: 53
End: 2018-07-25

## 2018-07-25 ENCOUNTER — TELEPHONE (OUTPATIENT)
Dept: SURGERY | Facility: CLINIC | Age: 53
End: 2018-07-25

## 2018-07-25 ENCOUNTER — ALLIED HEALTH/NURSE VISIT (OUTPATIENT)
Dept: SURGERY | Facility: CLINIC | Age: 53
End: 2018-07-25
Payer: OTHER MISCELLANEOUS

## 2018-07-25 ENCOUNTER — OFFICE VISIT (OUTPATIENT)
Dept: SURGERY | Facility: CLINIC | Age: 53
End: 2018-07-25
Payer: OTHER MISCELLANEOUS

## 2018-07-25 ENCOUNTER — APPOINTMENT (OUTPATIENT)
Dept: SURGERY | Facility: CLINIC | Age: 53
End: 2018-07-25
Payer: OTHER MISCELLANEOUS

## 2018-07-25 VITALS
BODY MASS INDEX: 30.11 KG/M2 | HEART RATE: 66 BPM | RESPIRATION RATE: 16 BRPM | SYSTOLIC BLOOD PRESSURE: 132 MMHG | OXYGEN SATURATION: 99 % | HEIGHT: 72 IN | DIASTOLIC BLOOD PRESSURE: 80 MMHG | WEIGHT: 222.3 LBS | TEMPERATURE: 98 F

## 2018-07-25 DIAGNOSIS — Z01.818 PREOP EXAMINATION: Primary | ICD-10-CM

## 2018-07-25 NOTE — TELEPHONE ENCOUNTER
Received phone call from Angela, they are waiting on a SUZETTE, so surgery for date of service 7/30/18 will not be approved.  Once that SUZETTE is done and they are able to review it, providers will be notified.

## 2018-07-25 NOTE — MR AVS SNAPSHOT
After Visit Summary   2018    Justice Yee    MRN: [id number]           Patient Information     Date Of Birth          1965        Visit Information        Provider Department      2018 9:00 AM Rn, [facility] Preoperative Assessment [facility]        Care Instructions    Preparing for Your Surgery      Name:  Justice Yee   MRN:  [id number]   :  1965   Today's Date:  2018     Arriving for surgery:  Surgery date:  18  Arrival time:  9:45 am    Please come to:   [facility]  [address] [place] [phone]     Parking is available in front of the Clinics and Surgery Center building from 5:30AM to 8:00PM.  -  Proceed to the 5th floor to check into the Ambulatory Surgery Center.              >> There will be patient concierges on the 1st and 5th floor, for assistance or an escort, if you would like.              >> Please call [phone] with any questions day of surgery.    -  Bring your ID and insurance card.    What can I eat or drink?  -  You may have solid food or milk products until 8 hours prior to your surgery. (Until 3 am )  -  You may have water, apple juice, clear BLACK coffee (NO creamer or nondairy creamer), or 7up/Sprite until 2 hours prior to your surgery. (Until 9 am )    Which medicines can I take?       -  Do not bring your own medications to the hospital.        -  Follow Plastics and reconstruction Clinic instructions regarding Ibuprofen. If no instructions given, NO Ibuprofen the day prior to surgery.     -  Hold any Aspirin product 7 days prior to surgery.    -  Please take these medications the morning of surgery:  Acetaminophen (Tylenol) if needed    How do I prepare myself?  -  Take two showers: one the night before surgery; and one the morning of surgery.         Use Scrubcare or Hibiclens to wash from neck down.  You may use your own shampoo and conditioner. No other hair products.   -  Do NOT  use lotion, powder, colognes, deodorant, or antiperspirant the day of your surgery.  -  Do NOT wear any jewelry.    Questions or Concerns:  If you have questions or concerns prior to your surgery, call [phone]. (Mon - Fri   8 am- 5:30 pm)  Questions after surgery, contact your Surgeons office.      AFTER YOUR SURGERY  Breathing exercises   Breathing exercises help you recover faster. Take deep breaths and let the air out slowly. This will:     Help you wake up after surgery.    Help prevent complications like pneumonia.  Preventing complications will help you go home sooner.   We may give you a breathing device (incentive spirometer) to encourage you to breathe deeply.   Nausea and vomiting   You may feel sick to your stomach after surgery; if so, let your nurse know.    Pain control:  After surgery, you may have pain. Our goal is to help you manage your pain. Pain medicine will help you feel comfortable enough to do activities that will help you heal.  These activities may include breathing exercises, walking and physical therapy.   To help your health care team treat your pain we will ask: 1) If you have pain  2) where it is located 3) describe your pain in your words  Methods of pain control include medications given by mouth, vein or by nerve block for some surgeries.  Sequential Compression Device (SCD) or Pneumo Boots:  You may need to wear SCD S on your legs or feet. These are wraps connected to a machine that pumps in air and releases it. The repeated pumping helps prevent blood clots from forming.                     Follow-ups after your visit        Your next 10 appointments already scheduled     Jul 25, 2018  8:30 AM CDT   (Arrive by 8:15 AM)   PAC Anesthesia Consult with  Pac Anesthesiologist   [facility] Preoperative Assessment Center ([facility])    [address] [place] [id number] [phone]            Jul 25, 2018  9:00 AM CDT   (Arrive by  8:45 AM)   PAC RN ASSESSMENT with Nadia Pac Rn   [facility] Preoperative Assessment Center ([facility])    [address] [place] [id number] [phone]            Jul 25, 2018 12:50 PM CDT   SHALOM Extremity with Bridget Almaguer PT   [facility] Physical Therapy SHALOM ([facility])    [address] [place] [address] 5th [facility] [id number] [phone]            Jul 30, 2018   Procedure with Carlitos Woodard MD   [facility] Surgery and Procedure Center ([facility])    [address] [place] [id number] [phone]           Located in the Clinics and Surgery Center at [address] [place] [address].   parking is very convenient and highly recommended.  is a $6 flat rate fee.  Both  and self parkers should enter the main arrival plaza from [facility]; parking attendants will direct you based on your parking preference.            Aug 03, 2018  3:45 PM CDT   (Arrive by 3:30 PM)   Post-Op with Carlitos Woodard MD   [facility] Plastic and Reconstructive Surgery ([facility])    [address] [place] [id number] [phone]              Who to contact     Please call your clinic at [phone] to:    Ask questions about your health    Make or cancel appointments    Discuss your medicines    Learn about your test results    Speak to your doctor            Additional Information About Your Visit        Care EveryWhere ID     This is your Care EveryWhere ID. This could be used by other organizations to access your [facility] medical records  [id number]         Blood Pressure from Last 3 Encounters:   07/25/18 (!) 148/93   11/14/17 128/83   11/13/17 156/85    Weight from Last 3 Encounters:   07/25/18 100.8 kg (222 lb 4.8 oz)   06/18/18 102.1 kg (225 lb)   11/14/17 96.2 kg (212 lb)              Today, you had the following      No orders found for display       Primary Care Provider Fax #    Physician No Ref-Primary [phone]       No address on file        Equal Access to Services     DANIEL HANSEN : Zaid Malloy, jerman coto, andressagerard castelanboochelly paezrios, sandra jaylinin hayaan jeanninemaster catalan laluciomaia dane. So [facility] [phone].    ATENCIÓN: Si habla español, tiene a otero disposición servicios gratuitos de asistencia lingüística. Llame al [phone].    We comply with applicable federal civil rights laws and [place] laws. We do not discriminate on the basis of race, color, national origin, age, disability, sex, sexual orientation, or gender identity.            Thank you!     Thank you for choosing [facility] PREOPERATIVE ASSESSMENT CENTER  for your care. Our goal is always to provide you with excellent care. Hearing back from our patients is one way we can continue to improve our services. Please take a few minutes to complete the written survey that you may receive in the mail after your visit with us. Thank you!             Your Updated Medication List - Protect others around you: Learn how to safely use, store and throw away your medicines at www.disposemymeds.org.      Notice  As of 7/25/2018  7:54 AM    You have not been prescribed any medications.

## 2018-07-25 NOTE — ANESTHESIA PREPROCEDURE EVALUATION
Anesthesia Evaluation     . Pt has had prior anesthetic. Type: Regional    No history of anesthetic complications          ROS/MED HX    ENT/Pulmonary:     (+)BOGDAN risk factors snores loudly, Past use , . .   Tobacco use: Quit 23 years ago.  3.5 pack-years.    Neurologic:  - neg neurologic ROS     Cardiovascular:  - neg cardiovascular ROS   (+) ----. : . . . :. . No previous cardiac testing       METS/Exercise Tolerance:  >4 METS   Hematologic:  - neg hematologic  ROS       Musculoskeletal: Comment: Right  Wrist ORIF 11/14/17        GI/Hepatic:  - neg GI/hepatic ROS       Renal/Genitourinary:  - ROS Renal section negative       Endo:     (+) Obesity (BMI 30), .      Psychiatric:  - neg psychiatric ROS       Infectious Disease:  - neg infectious disease ROS       Malignancy:      - no malignancy   Other:    (+) No chance of pregnancy C-spine cleared: Yes, no H/O Chronic Pain,no other significant disability                    Physical Exam      Airway   Mallampati: II  TM distance: >3 FB  Neck ROM: full    Dental   (+) missing  Comment: Bottom front tooth missing.   Dentition in fair repair.     Cardiovascular   Rhythm and rate: regular and normal      Pulmonary    breath sounds clear to auscultation    Other findings:   Lab Results      Component                Value               Date                      HGB                      14.8                11/13/2017            Last Basic Metabolic Panel:  Lab Results      Component                Value               Date                      POTASSIUM                3.8                 11/13/2017            Lab Results      Component                Value               Date                      CR                       0.54                11/13/2017                         PAC Discussion and Assessment    ASA Classification: 3  Case is suitable for: ASC  Anesthetic techniques and relevant risks discussed: PAC Recommendations anesthetic techniques: Choice.  Invasive  monitoring and risk discussed:   Types:   Possibility and Risk of blood transfusion discussed: No  NPO instructions given:   Additional anesthetic preparation and risks discussed:   Needs early admission to pre-op area:   Other:     PAC Resident/NP Anesthesia Assessment:  Justice Yee is a 52 year old male scheduled for Release of right wrist first dorsal compartment on 7/30/2018 with Dr. Carlitos Woodard at [facility] [place] under choice anesthesia.  Mr. Yee has DeQuervain's tenosynovitis and ECU tendonitis and was seen in follow-up with Dr. Woodard on 7/19/18.  Given Mr. Yee has exhausted non-operative measures and is still having right wrist radial sided pain, Dr. Woodard recommended the above procedure.  PAC referral for risk assessment and optimization of anesthesia with comorbid conditions including:  s/p  ORIF right wrist 11/14/17.    He has the following specific operative considerations:   - METS:  >. RCRI : No serious cardiac risks.  0.4 % risk of major adverse cardiac event..  - BOGDAN # of risks 4/8 = intermediate  - VTE risk:  0.5%  - Risk of PONV score = 1.  If > 2, anti-emetic intervention recommended.    1.  Cardiology - Denies cardiac history or symptoms.  Easily achieves 4 METS.  No further cardiac evaluation needed per 2014 ACC/AHA guidelines for non-cardiac surgery.   2.  Pulmonary - remote smoking hx quitting 23 years ago>>>3.5 pack-years  3.  Musculoskeletal -  DeQuervain's tenosynovitis & ECU tendonitis>>>exhausted non-operative measures>>>above procedure planned.  Rarely taking acetaminophen for pain.        - S/P right wrist ORIF with peripheral nerve block 11/14/17    - Anesthesia considerations:  Refer to PAC assessment in anesthesia records      Arrival time, NPO, shower and medication instructions provided by nursing staff today.  Preparing For Your Surgery handout given.  Patient was discussed with Dr Kellogg. I spent 20 minutes face to face with patient assessing, educating,  counseling and/or coordinating care and examining the patient.  Of that 20 minutes, I spent greater than 50% of my time counseling and/or coordinating care.      Reviewed and Signed by PAC Mid-Level Provider/Resident  Mid-Level Provider/Resident: Tati BERMUDEZ CNP  Date: 7/25/18  Time: 7:46    Attending Anesthesiologist Anesthesia Assessment:        Anesthesiologist:   Date:   Time:   Pass/Fail:   Disposition:     PAC Pharmacist Assessment:        Pharmacist:   Date:   Time:                           .

## 2018-07-25 NOTE — TELEPHONE ENCOUNTER
I have called patients -[email or web address], left her 3 or 4 voice messages now, no reply from her.  Also left voice message with Yovana -no reply from her today.

## 2018-07-25 NOTE — PATIENT INSTRUCTIONS
Preparing for Your Surgery      Name:  Justice Yee   MRN:  [id number]   :  1965   Today's Date:  2018     Arriving for surgery:  Surgery date:  18  Arrival time:  9:45 am    Please come to:   [facility]  [address] [place] [phone]     Parking is available in front of the Clinics and Surgery Center building from 5:30AM to 8:00PM.  -  Proceed to the 5th floor to check into the Ambulatory Surgery Center.              >> There will be patient concierges on the 1st and 5th floor, for assistance or an escort, if you would like.              >> Please call [phone] with any questions day of surgery.    -  Bring your ID and insurance card.    What can I eat or drink?  -  You may have solid food or milk products until 8 hours prior to your surgery. (Until 3 am )  -  You may have water, apple juice, clear BLACK coffee (NO creamer or nondairy creamer), or 7up/Sprite until 2 hours prior to your surgery. (Until 9 am )    Which medicines can I take?       -  Do not bring your own medications to the hospital.        -  Follow Plastics and reconstruction Clinic instructions regarding Ibuprofen. If no instructions given, NO Ibuprofen the day prior to surgery.     -  Hold any Aspirin product 7 days prior to surgery.    -  Please take these medications the morning of surgery:  Acetaminophen (Tylenol) if needed    How do I prepare myself?  -  Take two showers: one the night before surgery; and one the morning of surgery.         Use Scrubcare or Hibiclens to wash from neck down.  You may use your own shampoo and conditioner. No other hair products.   -  Do NOT use lotion, powder, colognes, deodorant, or antiperspirant the day of your surgery.  -  Do NOT wear any jewelry.    Questions or Concerns:  If you have questions or concerns prior to your surgery, call [phone]. (Mon - Fri   8 am- 5:30 pm)  Questions after surgery, contact your Surgeons  office.      AFTER YOUR SURGERY  Breathing exercises   Breathing exercises help you recover faster. Take deep breaths and let the air out slowly. This will:     Help you wake up after surgery.    Help prevent complications like pneumonia.  Preventing complications will help you go home sooner.   We may give you a breathing device (incentive spirometer) to encourage you to breathe deeply.   Nausea and vomiting   You may feel sick to your stomach after surgery; if so, let your nurse know.    Pain control:  After surgery, you may have pain. Our goal is to help you manage your pain. Pain medicine will help you feel comfortable enough to do activities that will help you heal.  These activities may include breathing exercises, walking and physical therapy.   To help your health care team treat your pain we will ask: 1) If you have pain  2) where it is located 3) describe your pain in your words  Methods of pain control include medications given by mouth, vein or by nerve block for some surgeries.  Sequential Compression Device (SCD) or Pneumo Boots:  You may need to wear SCD S on your legs or feet. These are wraps connected to a machine that pumps in air and releases it. The repeated pumping helps prevent blood clots from forming.

## 2018-07-25 NOTE — TELEPHONE ENCOUNTER
I have left 2 VM with  and one VM with another representative, need information on wether a prior authorization is needed

## 2018-07-25 NOTE — H&P
Pre-Operative H & P     CC:  Preoperative exam to assess for increased cardiopulmonary risk while undergoing surgery and anesthesia.    Date of Encounter: 7/25/2018  Primary Care Physician:  No Ref-Primary, Physician  Reason:  DeQuervain's tenosynovitis and ECU tendonitis     HPI  Justice Yee is a 52 year old male who presents for pre-operative H & P in preparation for Release of right wrist first dorsal compartment on 7/30/2018 with Dr. Carlitos Woodard at [facility] under choice anesthesia.  Mr. Yee has DeQuervain's tenosynovitis and ECU tendonitis and was seen in follow-up with Dr. Woodard on 7/19/18.  Given Mr. Yee has exhausted non-operative measures and is still having right wrist radial sided pain, Dr. Woodard recommended the above procedure.  PAC referral for risk assessment and optimization of anesthesia with comorbid conditions including:  s/p  ORIF right wrist 11/14/17.       History is obtained from the patient and electronic health record.     Past Medical History  Past Medical History:   Diagnosis Date     Fracture of wrist 11/2017     Radial styloid tenosynovitis        Past Surgical History  Past Surgical History:   Procedure Laterality Date     OPEN REDUCTION INTERNAL FIXATION WRIST Right 11/14/2017    Procedure: OPEN REDUCTION INTERNAL FIXATION WRIST;  Open Reduction Internal Fixation of Right Distal Radius Fracture;  Surgeon: Carlitos Woodard MD;  Location: [facility] OR       Hx of Blood transfusions/reactions: denies     Hx of abnormal bleeding or anti-platelet use: denies    Steroid use in the last year: yes - steroid injection injection to right wrist     Personal or FH with difficulty with Anesthesia:  denies    Prior to Admission Medications  No current outpatient prescriptions on file.       Allergies  No Known Allergies    Social History  Social History     Social History     Marital status:      Spouse name: N/A     Number of children: 3     Years of education: N/A  "    Occupational History     Not on file.     Social History Main Topics     Smoking status: Former Smoker     Packs/day: 0.50     Years: 7.00     Quit date: 11/13/1995     Smokeless tobacco: Never Used     Alcohol use No     Drug use: No     Sexual activity: Not on file     Other Topics Concern     Not on file     Social History Narrative       Family History  Family History   Problem Relation Age of Onset     Diabetes Mother        ROS/MED HX    ENT/Pulmonary:     (+)BOGDAN risk factors snores loudly, Past use , . .   Tobacco use: Quit 23 years ago.  3.5 pack-years.    Neurologic:  - neg neurologic ROS     Cardiovascular:  - neg cardiovascular ROS   (+) ----. : . . . :. . No previous cardiac testing       METS/Exercise Tolerance:  >4 METS   Hematologic:  - neg hematologic  ROS       Musculoskeletal: Comment: Right  Wrist ORIF 11/14/17        GI/Hepatic:  - neg GI/hepatic ROS       Renal/Genitourinary:  - ROS Renal section negative       Endo:     (+) Obesity (BMI 30), .      Psychiatric:  - neg psychiatric ROS       Infectious Disease:  - neg infectious disease ROS       Malignancy:      - no malignancy   Other:    (+) No chance of pregnancy C-spine cleared: Yes, no H/O Chronic Pain,no other significant disability           Temp: 98  F (36.7  C) Temp src: Oral BP: 132/80 Pulse: 66   Resp: 16 SpO2: 99 %         222 lbs 4.8 oz  6' 0\"   Body mass index is 30.15 kg/(m^2).       Physical Exam  Constitutional: Awake, alert, cooperative, no apparent distress, and appears stated age.  Eyes: Pupils equal, round and reactive to light, extra ocular muscles intact, sclera clear, conjunctiva normal.  HENT: Normocephalic, oral pharynx with moist mucus membranes, bottom front tooth missing and remaining dentition in fair repair. No goiter appreciated.   Respiratory: Clear to auscultation bilaterally, no crackles or wheezing.  Cardiovascular: Regular rate and rhythm, normal S1 and S2, and no murmur noted.  Carotids +2, no bruits. No " edema. Palpable pulses to radial  DP and PT arteries.   GI: Normal bowel sounds, soft, non-distended, non-tender, no masses palpated, no hepatosplenomegaly.    Lymph/Hematologic: No cervical lymphadenopathy and no supraclavicular lymphadenopathy.  Genitourinary:  na  Skin: Warm and dry.  No rashes at anticipated surgical site. Well healed vertical surgical scar on right wrist.  Musculoskeletal: Full ROM of neck. There is no redness, warmth, or swelling of the joints. Gross motor strength is normal.    Neurologic: Awake, alert, oriented to name, place and time. Cranial nerves II-XII are grossly intact. Gait is normal.   Neuropsychiatric: Calm, cooperative. Normal affect.     Labs: (personally reviewed)  Lab Results      Component                Value               Date                      HGB                      14.8                11/13/2017            Last Basic Metabolic Panel:  Lab Results      Component                Value               Date                      POTASSIUM                3.8                 11/13/2017            Lab Results      Component                Value               Date                      CR                       0.54                11/13/2017              ASSESSMENT and PLAN  Justice Yee is a 52 year old male scheduled to undergo Release of right wrist first dorsal compartment on 7/30/2018 with Dr. Carlitos Woodard at [facility] under choice anesthesia. .    He has the following specific operative considerations:   - METS:  >. RCRI : No serious cardiac risks.  0.4 % risk of major adverse cardiac event..  - BOGDAN # of risks 4/8 = intermediate  - VTE risk:  0.5%  - Risk of PONV score = 1.  If > 2, anti-emetic intervention recommended.    1.  Cardiology - Denies cardiac history or symptoms.  Easily achieves 4 METS.  No further cardiac evaluation needed per 2014 ACC/AHA guidelines for non-cardiac surgery.   2.  Pulmonary - remote smoking hx quitting 23 years  ago>>>3.5 pack-years.  3.  Musculoskeletal -  DeQuervain's tenosynovitis & ECU tendonitis>>>exhausted non-operative measures>>>above procedure planned.  Rarely taking acetaminophen for pain.        - S/P right wrist ORIF with peripheral nerve block 11/14/17  4.  GI - Obesity, weight has increased since wrist injury last November by 10 pounds.  Encouraged to loose weight with improved diet and exercise once recovered from above surgery.      - Anesthesia considerations:  Refer to PAC assessment in anesthesia records    Arrival time, NPO, shower and medication instructions provided by nursing staff today.  Preparing For Your Surgery handout given.  Patient was discussed with Dr Kellogg. I spent 20 minutes face to face with patient assessing, educating, counseling and/or coordinating care and examining the patient.  Of that 20 minutes, I spent greater than 50% of my time counseling and/or coordinating care.      LARA Wilson CNP  Preoperative Assessment Center  [facility]  Clinic and Surgery Center  Phone: [phone]  Fax: [phone]

## 2018-07-31 ENCOUNTER — TELEPHONE (OUTPATIENT)
Dept: SURGERY | Facility: CLINIC | Age: 53
End: 2018-07-31

## 2018-07-31 NOTE — TELEPHONE ENCOUNTER
Received letter from workers comp-please be advised we are awaiting results of SUZETTE prior to authorization for surgery.

## 2018-08-02 ENCOUNTER — THERAPY VISIT (OUTPATIENT)
Dept: PHYSICAL THERAPY | Facility: CLINIC | Age: 53
End: 2018-08-02
Payer: OTHER MISCELLANEOUS

## 2018-08-02 DIAGNOSIS — M25.511 SHOULDER PAIN, RIGHT: ICD-10-CM

## 2018-08-02 PROCEDURE — 97110 THERAPEUTIC EXERCISES: CPT | Mod: GP | Performed by: PHYSICAL THERAPY ASSISTANT

## 2018-08-02 PROCEDURE — 97140 MANUAL THERAPY 1/> REGIONS: CPT | Mod: GP | Performed by: PHYSICAL THERAPY ASSISTANT

## 2018-09-06 ENCOUNTER — THERAPY VISIT (OUTPATIENT)
Dept: PHYSICAL THERAPY | Facility: CLINIC | Age: 53
End: 2018-09-06
Payer: OTHER MISCELLANEOUS

## 2018-09-06 ENCOUNTER — TELEPHONE (OUTPATIENT)
Dept: SURGERY | Facility: CLINIC | Age: 53
End: 2018-09-06

## 2018-09-06 DIAGNOSIS — M25.511 SHOULDER PAIN, RIGHT: ICD-10-CM

## 2018-09-06 PROCEDURE — 97110 THERAPEUTIC EXERCISES: CPT | Mod: GP | Performed by: PHYSICAL THERAPIST

## 2018-09-06 PROCEDURE — 97140 MANUAL THERAPY 1/> REGIONS: CPT | Mod: GP | Performed by: PHYSICAL THERAPIST

## 2018-09-06 PROCEDURE — 97112 NEUROMUSCULAR REEDUCATION: CPT | Mod: GP | Performed by: PHYSICAL THERAPIST

## 2018-09-06 NOTE — TELEPHONE ENCOUNTER
I talked to [email or web address] who states SUZETTE is scheduled for 9/10/18- she will have another representative call me with more information.  I called patient to let him know what is happening

## 2018-09-06 NOTE — MR AVS SNAPSHOT
After Visit Summary   9/6/2018    Justice Yee    MRN: [id number]           Patient Information     Date Of Birth          1965        Visit Information        Provider Department      9/6/2018 11:40 AM Pedro Loera PT [facility]        Today's Diagnoses     Shoulder pain, right           Follow-ups after your visit        Your next 10 appointments already scheduled     Sep 11, 2018   Procedure with Carlitos Woodard MD   [facility] ([facility] [place])    [address] [place] [address]  [address]th [place] [id number] [phone]           Located in the Clinics and Surgery Center at [address] [place] [address].   parking is very convenient and highly recommended.  is a $6 flat rate fee.  Both  and self parkers should enter the main arrival plaza from [facility]; parking attendants will direct you based on your parking preference.            Sep 20, 2018  3:15 PM CDT   (Arrive by 3:00 PM)   Post-Op with Carlitos Woodard MD   [facility] Plastic and Reconstructive Surgery ([facility] [place])    [address] [place] [address]  4th [facility] [address] [phone]              Who to contact     If you have questions or need follow up information about today's clinic visit or your schedule please contact [facility] PH[facility]AP[facility] directly at [phone].  Normal or non-critical lab and imaging results will be communicated to you by MyChart, letter or phone within 4 business days after the clinic has received the results. If you do not hear from us within 7 days, please contact the clinic through MyChart or phone. If you have a critical or abnormal lab result, we will notify you by phone as soon as possible.  Submit refill requests through [facility] or call your pharmacy and they will forward the refill request to us. Please allow 3 business days for your refill to be completed.           Additional Information About Your Visit        Care EveryWhere ID     This is your Care EveryWhere ID. This could be used by other organizations to access your [facility] medical records  [id number]         Blood Pressure from Last 3 Encounters:   07/25/18 132/80   11/14/17 128/83   11/13/17 156/85    Weight from Last 3 Encounters:   07/25/18 100.8 kg (222 lb 4.8 oz)   06/18/18 102.1 kg (225 lb)   11/14/17 96.2 kg (212 lb)              We Performed the Following     MANUAL THER TECH,1+REGIONS,EA 15 MIN     NEUROMUSCULAR RE-EDUCATION     THERAPEUTIC EXERCISES        Primary Care Provider Fax #    Physician No Ref-Primary [phone]       No address on file        Equal Access to Services     DANIEL HANSEN : Zaid Malloy, jerman coto, pola kaalmachelly poole, sandra vela. So [facility] [phone].    ATENCIÓN: Si habla español, tiene a otero disposición servicios gratuitos de asistencia lingüística. Llame al [phone].    We comply with applicable federal civil rights laws and [place] laws. We do not discriminate on the basis of race, color, national origin, age, disability, sex, sexual orientation, or gender identity.            Thank you!     Thank you for choosing [facility] PHYS[facility]ER[facility]  for your care. Our goal is always to provide you with excellent care. Hearing back from our patients is one way we can continue to improve our services. Please take a few minutes to complete the written survey that you may receive in the mail after your visit with us. Thank you!             Your Updated Medication List - Protect others around you: Learn how to safely use, store and throw away your medicines at www.disposemymeds.org.      Notice  As of 9/6/2018 12:22 PM    You have not been prescribed any medications.

## 2018-09-07 ENCOUNTER — TELEPHONE (OUTPATIENT)
Dept: ORTHOPEDICS | Facility: CLINIC | Age: 53
End: 2018-09-07

## 2018-09-07 ENCOUNTER — TELEPHONE (OUTPATIENT)
Dept: SURGERY | Facility: CLINIC | Age: 53
End: 2018-09-07

## 2018-09-07 NOTE — TELEPHONE ENCOUNTER
Patient was informed surgery will need to be cancelled and rescheduled after the SUZETTE is reviewed.  He verbalized understanding and is agreeable with this.

## 2018-09-07 NOTE — TELEPHONE ENCOUNTER
I talked to , an SUZETTE can take up to 3 weeks to review, staff message, need to cancel surgery scheduled on 9/11/18, do we want to reschedule or wait until we hear the results of the SUZETTE?

## 2018-10-23 ENCOUNTER — TELEPHONE (OUTPATIENT)
Dept: SURGERY | Facility: CLINIC | Age: 53
End: 2018-10-23

## 2018-10-23 NOTE — TELEPHONE ENCOUNTER
Left a VM with Angela Barlow at [facility], I need a phone call back, has the results come in from the SUZETTE done in sept?  So I can request a PA for surgery with Dr Woodard

## 2018-10-24 ENCOUNTER — TELEPHONE (OUTPATIENT)
Dept: SURGERY | Facility: CLINIC | Age: 53
End: 2018-10-24

## 2018-10-24 NOTE — TELEPHONE ENCOUNTER
I received a VM from [email or web address], there will be no authorizations from them authorizing surgery, there has been an agreement to settle.  I will let the financial counselor know, to send quote to patient, as this will now be a self pay issue

## 2019-04-08 PROBLEM — M25.511 SHOULDER PAIN, RIGHT: Status: RESOLVED | Noted: 2018-06-20 | Resolved: 2019-04-08

## 2019-04-08 NOTE — PROGRESS NOTES
Discharge Note    Progress reporting period is from initial eval to Sep 6, 2018.     Justice failed to return for next follow up visit and current status is unknown.  Please see information below for last relevant information on current status.  Patient seen for Rxs Used: 6 visits.    SUBJECTIVE  Subjective changes noted by patient:  Subjective: Shoulder only hurting now with abduction in that plane, scaption and flexion are completely fine. Has wrist surgery next Tuesday. .  Current pain level is Current Pain level: No change.     Previous pain level was   .   Changes in function:  Yes (See Goal flowsheet attached for changes in current functional level)  Adverse reaction to treatment or activity: None    OBJECTIVE  Changes noted in objective findings: Objective: Positive painful arc of ABD, negative in scapular plane or flexion. Pain w/ resisted ABD. Added eccentric ABD with yellow tband to HEP.    ASSESSMENT/PLAN  Diagnosis: R biceps tendinopathy   DIAGP:  The encounter diagnosis was Shoulder pain, right.  Updated problem list and treatment plan:     Decreased ROM/flexibility - HEP  Decreased strength - HEP    STG/LTGs have been met or progress has been made towards goals:  Yes, please see goal flowsheet for most current information.    Assessment of Progress: current status is unknown.  Last current status: Assessment of progress: Pt is progressing slower than anticipated.  Self Management Plans:  HEP    I have re-evaluated this patient and find that the nature, scope, duration and intensity of the therapy is appropriate for the medical condition of the patient.  Justice continues to require the following intervention to meet STG and LTG's:  HEP.    Recommendations:  Discharge with current home program.  Patient to follow up with MD as needed. Episode to be closed at this time and patient formally discharged from therapy.    Pedro Loera, PT, DPT, OCS      Please refer to the daily flowsheet for treatment  today, total treatment time and time spent performing 1:1 timed codes.

## (undated) DEVICE — PACK HAND CUSTOM ASC

## (undated) DEVICE — SOL NACL 0.9% IRRIG 1000ML BOTTLE 2F7124

## (undated) DEVICE — WIRE GUIDE 0.054X6" ACUTRAK SGL TROCAR TIP SS WS-1406ST

## (undated) DEVICE — SU MONOCRYL 3-0 PS-2 18" UND Y497G

## (undated) DEVICE — BNDG ELASTIC 4"X5YDS UNSTERILE 6611-40

## (undated) DEVICE — DRAPE C-ARM OEC MINI VIEW 6800   00-901917-01

## (undated) DEVICE — PREP CHLORAPREP 26ML TINTED ORANGE  260815

## (undated) DEVICE — SU VICRYL 4-0 PS-2 18" UND J496G

## (undated) DEVICE — LINEN ORTHO PACK 5446

## (undated) DEVICE — CAST PADDING 3" COTTON WEBRIL STERILE 9083S

## (undated) DEVICE — SUCTION MANIFOLD NEPTUNE 2 SYS 1 PORT 702-025-000

## (undated) DEVICE — DRSG STERI STRIP 1/2X4" R1547

## (undated) DEVICE — SOL WATER IRRIG 1000ML BOTTLE 2F7114

## (undated) DEVICE — RX BACITRACIN OINTMENT 0.9G 1/32OZ CUR001109

## (undated) DEVICE — BNDG KLING 2" 2231

## (undated) DEVICE — DRILL BIT ACUMED QUICK COUPLER 2.0MM 80-0318

## (undated) DEVICE — DRAPE STERI TOWEL LG 1010

## (undated) DEVICE — SU VICRYL 2-0 CP-2 27" UND J869H

## (undated) DEVICE — Device

## (undated) DEVICE — GLOVE PROTEXIS BLUE W/NEU-THERA 7.5  2D73EB75

## (undated) DEVICE — GLOVE PROTEXIS W/NEU-THERA 7.5  2D73TE75

## (undated) DEVICE — ESU HOLSTER PLASTIC DISP E2400

## (undated) DEVICE — LINEN GOWN XLG 5407

## (undated) RX ORDER — LIDOCAINE HYDROCHLORIDE 10 MG/ML
INJECTION, SOLUTION INFILTRATION; PERINEURAL
Status: DISPENSED
Start: 2018-01-18

## (undated) RX ORDER — FENTANYL CITRATE 50 UG/ML
INJECTION, SOLUTION INTRAMUSCULAR; INTRAVENOUS
Status: DISPENSED
Start: 2017-11-14

## (undated) RX ORDER — ACETAMINOPHEN 325 MG/1
TABLET ORAL
Status: DISPENSED
Start: 2017-11-14

## (undated) RX ORDER — PHENYLEPHRINE HCL IN 0.9% NACL 1 MG/10 ML
SYRINGE (ML) INTRAVENOUS
Status: DISPENSED
Start: 2017-11-14

## (undated) RX ORDER — BUPIVACAINE HYDROCHLORIDE 5 MG/ML
INJECTION, SOLUTION EPIDURAL; INTRACAUDAL
Status: DISPENSED
Start: 2017-11-14

## (undated) RX ORDER — PROPOFOL 10 MG/ML
INJECTION, EMULSION INTRAVENOUS
Status: DISPENSED
Start: 2017-11-14

## (undated) RX ORDER — LIDOCAINE HYDROCHLORIDE 20 MG/ML
INJECTION, SOLUTION EPIDURAL; INFILTRATION; INTRACAUDAL; PERINEURAL
Status: DISPENSED
Start: 2017-11-14

## (undated) RX ORDER — GABAPENTIN 300 MG/1
CAPSULE ORAL
Status: DISPENSED
Start: 2017-11-14